# Patient Record
Sex: MALE | Race: WHITE | NOT HISPANIC OR LATINO | Employment: OTHER | URBAN - METROPOLITAN AREA
[De-identification: names, ages, dates, MRNs, and addresses within clinical notes are randomized per-mention and may not be internally consistent; named-entity substitution may affect disease eponyms.]

---

## 2017-01-13 ENCOUNTER — COMMUNICATION - HEALTHEAST (OUTPATIENT)
Dept: FAMILY MEDICINE | Facility: CLINIC | Age: 57
End: 2017-01-13

## 2017-02-26 ENCOUNTER — COMMUNICATION - HEALTHEAST (OUTPATIENT)
Dept: FAMILY MEDICINE | Facility: CLINIC | Age: 57
End: 2017-02-26

## 2017-02-26 DIAGNOSIS — I10 ESSENTIAL HYPERTENSION WITH GOAL BLOOD PRESSURE LESS THAN 140/90: ICD-10-CM

## 2017-05-14 ENCOUNTER — COMMUNICATION - HEALTHEAST (OUTPATIENT)
Dept: FAMILY MEDICINE | Facility: CLINIC | Age: 57
End: 2017-05-14

## 2017-05-14 DIAGNOSIS — R73.03 PREDIABETES: ICD-10-CM

## 2017-05-14 DIAGNOSIS — I10 ESSENTIAL HYPERTENSION: ICD-10-CM

## 2017-07-17 ENCOUNTER — OFFICE VISIT - HEALTHEAST (OUTPATIENT)
Dept: FAMILY MEDICINE | Facility: CLINIC | Age: 57
End: 2017-07-17

## 2017-07-17 DIAGNOSIS — Z23 NEED FOR VACCINATION: ICD-10-CM

## 2017-07-17 DIAGNOSIS — L57.0 ACTINIC KERATOSIS: ICD-10-CM

## 2017-07-17 DIAGNOSIS — I10 ESSENTIAL HYPERTENSION WITH GOAL BLOOD PRESSURE LESS THAN 140/90: ICD-10-CM

## 2017-07-17 DIAGNOSIS — G47.33 OSA (OBSTRUCTIVE SLEEP APNEA): ICD-10-CM

## 2017-07-17 ASSESSMENT — MIFFLIN-ST. JEOR: SCORE: 1967.9

## 2017-07-17 NOTE — ASSESSMENT & PLAN NOTE
Increase clonidine to 0.2 mg twice a day  Renal ultrasound to rule out renal artery stenosis  Follow-up in 2-3 weeks for recheck, sooner if warning signs and symptoms as discussed  Start using CPAP, will send back to sleep clinic for further evaluation and help with adjustments and recommendations for use.

## 2017-07-28 ENCOUNTER — HOSPITAL ENCOUNTER (OUTPATIENT)
Dept: ULTRASOUND IMAGING | Facility: HOSPITAL | Age: 57
Discharge: HOME | End: 2017-07-28
Attending: FAMILY MEDICINE

## 2017-07-28 DIAGNOSIS — I10 ESSENTIAL HYPERTENSION WITH GOAL BLOOD PRESSURE LESS THAN 140/90: ICD-10-CM

## 2017-08-03 ENCOUNTER — COMMUNICATION - HEALTHEAST (OUTPATIENT)
Dept: FAMILY MEDICINE | Facility: CLINIC | Age: 57
End: 2017-08-03

## 2017-08-07 ENCOUNTER — OFFICE VISIT - HEALTHEAST (OUTPATIENT)
Dept: FAMILY MEDICINE | Facility: CLINIC | Age: 57
End: 2017-08-07

## 2017-08-07 DIAGNOSIS — D35.01 ADRENAL ADENOMA, RIGHT: ICD-10-CM

## 2017-08-07 DIAGNOSIS — I10 ESSENTIAL HYPERTENSION WITH GOAL BLOOD PRESSURE LESS THAN 140/90: ICD-10-CM

## 2017-08-07 ASSESSMENT — MIFFLIN-ST. JEOR: SCORE: 1939.33

## 2017-08-07 NOTE — ASSESSMENT & PLAN NOTE
Improved.  Not fully controlled but significantly improved.  Diet and exercise highly encouraged to include bariatric program which patient is considering at this point.  Will change from the oral clonidine over to the patch.  Side effects precautions discussed.  Follow-up in 6 months for recheck, sooner if warning signs and symptoms as discussed.

## 2017-11-28 ENCOUNTER — COMMUNICATION - HEALTHEAST (OUTPATIENT)
Dept: FAMILY MEDICINE | Facility: CLINIC | Age: 57
End: 2017-11-28

## 2017-11-28 DIAGNOSIS — I10 ESSENTIAL HYPERTENSION: ICD-10-CM

## 2018-02-22 ENCOUNTER — COMMUNICATION - HEALTHEAST (OUTPATIENT)
Dept: FAMILY MEDICINE | Facility: CLINIC | Age: 58
End: 2018-02-22

## 2018-02-22 DIAGNOSIS — I10 HYPERTENSION: ICD-10-CM

## 2018-02-22 DIAGNOSIS — I10 ESSENTIAL HYPERTENSION: ICD-10-CM

## 2018-02-25 ENCOUNTER — COMMUNICATION - HEALTHEAST (OUTPATIENT)
Dept: FAMILY MEDICINE | Facility: CLINIC | Age: 58
End: 2018-02-25

## 2018-02-25 DIAGNOSIS — E78.00 PURE HYPERCHOLESTEROLEMIA: ICD-10-CM

## 2018-02-25 DIAGNOSIS — I10 ESSENTIAL HYPERTENSION: ICD-10-CM

## 2018-07-19 ENCOUNTER — COMMUNICATION - HEALTHEAST (OUTPATIENT)
Dept: FAMILY MEDICINE | Facility: CLINIC | Age: 58
End: 2018-07-19

## 2018-07-19 DIAGNOSIS — I10 ESSENTIAL HYPERTENSION WITH GOAL BLOOD PRESSURE LESS THAN 140/90: ICD-10-CM

## 2018-09-10 ENCOUNTER — OFFICE VISIT - HEALTHEAST (OUTPATIENT)
Dept: FAMILY MEDICINE | Facility: CLINIC | Age: 58
End: 2018-09-10

## 2018-09-10 DIAGNOSIS — Z12.11 SCREEN FOR COLON CANCER: ICD-10-CM

## 2018-09-10 DIAGNOSIS — E66.01 MORBID OBESITY (H): ICD-10-CM

## 2018-09-10 DIAGNOSIS — D35.01 ADRENAL ADENOMA, RIGHT: ICD-10-CM

## 2018-09-10 DIAGNOSIS — I10 ESSENTIAL HYPERTENSION: ICD-10-CM

## 2018-09-10 DIAGNOSIS — G47.33 OSA (OBSTRUCTIVE SLEEP APNEA): ICD-10-CM

## 2018-09-10 DIAGNOSIS — Z12.5 SCREENING FOR PROSTATE CANCER: ICD-10-CM

## 2018-09-10 DIAGNOSIS — R73.03 PREDIABETES: ICD-10-CM

## 2018-09-10 DIAGNOSIS — E78.00 PURE HYPERCHOLESTEROLEMIA: ICD-10-CM

## 2018-09-10 LAB
ALT SERPL W P-5'-P-CCNC: 21 U/L (ref 0–45)
ANION GAP SERPL CALCULATED.3IONS-SCNC: 10 MMOL/L (ref 5–18)
BUN SERPL-MCNC: 20 MG/DL (ref 8–22)
CALCIUM SERPL-MCNC: 9.3 MG/DL (ref 8.5–10.5)
CHLORIDE BLD-SCNC: 102 MMOL/L (ref 98–107)
CHOLEST SERPL-MCNC: 159 MG/DL
CO2 SERPL-SCNC: 31 MMOL/L (ref 22–31)
CREAT SERPL-MCNC: 0.9 MG/DL (ref 0.7–1.3)
CREAT UR-MCNC: 104.3 MG/DL
ERYTHROCYTE [DISTWIDTH] IN BLOOD BY AUTOMATED COUNT: 11.4 % (ref 11–14.5)
FASTING STATUS PATIENT QL REPORTED: YES
GFR SERPL CREATININE-BSD FRML MDRD: >60 ML/MIN/1.73M2
GLUCOSE BLD-MCNC: 122 MG/DL (ref 70–125)
HCT VFR BLD AUTO: 44 % (ref 40–54)
HDLC SERPL-MCNC: 38 MG/DL
HGB BLD-MCNC: 14.8 G/DL (ref 14–18)
LDLC SERPL CALC-MCNC: 104 MG/DL
MCH RBC QN AUTO: 30.7 PG (ref 27–34)
MCHC RBC AUTO-ENTMCNC: 33.6 G/DL (ref 32–36)
MCV RBC AUTO: 91 FL (ref 80–100)
MICROALBUMIN UR-MCNC: 48 MG/DL (ref 0–1.99)
MICROALBUMIN/CREAT UR: 460.2 MG/G
PLATELET # BLD AUTO: 301 THOU/UL (ref 140–440)
PMV BLD AUTO: 8.7 FL (ref 7–10)
POTASSIUM BLD-SCNC: 3.3 MMOL/L (ref 3.5–5)
PSA SERPL-MCNC: 3.2 NG/ML (ref 0–4.5)
RBC # BLD AUTO: 4.83 MILL/UL (ref 4.4–6.2)
SODIUM SERPL-SCNC: 143 MMOL/L (ref 136–145)
TRIGL SERPL-MCNC: 87 MG/DL
TSH SERPL DL<=0.005 MIU/L-ACNC: 2.39 UIU/ML (ref 0.3–5)
WBC: 7 THOU/UL (ref 4–11)

## 2018-09-10 ASSESSMENT — MIFFLIN-ST. JEOR: SCORE: 1938.65

## 2018-09-10 NOTE — ASSESSMENT & PLAN NOTE
Poorly controlled on quadruple therapy. Currently on clonidine, hydrochlorothiazide, lisinopril and metoprolol.  Denies any headaches, changes in vision, or warning signs.  As such I think he is safe to be discharged home.  But will get follow-up with nephrology for further evaluation.

## 2018-09-10 NOTE — ASSESSMENT & PLAN NOTE
Initially found in 2008 on CT of the abdomen, it was also noticed again on the 2017 ultrasound, size has not changed

## 2018-09-10 NOTE — ASSESSMENT & PLAN NOTE
Patient did not follow-up with sleep clinic as advised last year.  Still noncompliant with CPAP therapy

## 2018-09-10 NOTE — ASSESSMENT & PLAN NOTE
History of coronary artery disease with stents.  Doing well.  Continue statin therapy as well as beta blocker therapy and aspirin therapy.

## 2018-09-13 ENCOUNTER — RECORDS - HEALTHEAST (OUTPATIENT)
Dept: ADMINISTRATIVE | Facility: OTHER | Age: 58
End: 2018-09-13

## 2018-09-20 ENCOUNTER — COMMUNICATION - HEALTHEAST (OUTPATIENT)
Dept: FAMILY MEDICINE | Facility: CLINIC | Age: 58
End: 2018-09-20

## 2018-09-25 ENCOUNTER — HOSPITAL ENCOUNTER (OUTPATIENT)
Dept: CT IMAGING | Facility: HOSPITAL | Age: 58
Discharge: HOME | End: 2018-09-25
Attending: INTERNAL MEDICINE

## 2018-09-25 ENCOUNTER — RECORDS - HEALTHEAST (OUTPATIENT)
Dept: ADMINISTRATIVE | Facility: OTHER | Age: 58
End: 2018-09-25

## 2018-09-25 DIAGNOSIS — D35.01 ADRENAL CORTICAL ADENOMA, RIGHT: ICD-10-CM

## 2018-10-11 ENCOUNTER — RECORDS - HEALTHEAST (OUTPATIENT)
Dept: ADMINISTRATIVE | Facility: OTHER | Age: 58
End: 2018-10-11

## 2019-03-28 ENCOUNTER — COMMUNICATION - HEALTHEAST (OUTPATIENT)
Dept: FAMILY MEDICINE | Facility: CLINIC | Age: 59
End: 2019-03-28

## 2019-03-28 DIAGNOSIS — I10 ESSENTIAL HYPERTENSION: ICD-10-CM

## 2019-03-28 DIAGNOSIS — E78.00 PURE HYPERCHOLESTEROLEMIA: ICD-10-CM

## 2019-04-30 ENCOUNTER — RECORDS - HEALTHEAST (OUTPATIENT)
Dept: ADMINISTRATIVE | Facility: OTHER | Age: 59
End: 2019-04-30

## 2019-06-03 ENCOUNTER — OFFICE VISIT - HEALTHEAST (OUTPATIENT)
Dept: FAMILY MEDICINE | Facility: CLINIC | Age: 59
End: 2019-06-03

## 2019-06-03 DIAGNOSIS — N40.1 BENIGN PROSTATIC HYPERPLASIA WITH URINARY FREQUENCY: ICD-10-CM

## 2019-06-03 DIAGNOSIS — E66.01 MORBID OBESITY (H): ICD-10-CM

## 2019-06-03 DIAGNOSIS — E78.00 PURE HYPERCHOLESTEROLEMIA: ICD-10-CM

## 2019-06-03 DIAGNOSIS — I10 ESSENTIAL HYPERTENSION: ICD-10-CM

## 2019-06-03 DIAGNOSIS — I25.83 CORONARY ARTERY DISEASE DUE TO LIPID RICH PLAQUE: ICD-10-CM

## 2019-06-03 DIAGNOSIS — I25.10 CORONARY ARTERY DISEASE DUE TO LIPID RICH PLAQUE: ICD-10-CM

## 2019-06-03 DIAGNOSIS — Z12.5 PROSTATE CANCER SCREENING: ICD-10-CM

## 2019-06-03 DIAGNOSIS — R35.0 BENIGN PROSTATIC HYPERPLASIA WITH URINARY FREQUENCY: ICD-10-CM

## 2019-06-03 LAB
ALBUMIN SERPL-MCNC: 3.8 G/DL (ref 3.5–5)
ALP SERPL-CCNC: 62 U/L (ref 45–120)
ALT SERPL W P-5'-P-CCNC: 27 U/L (ref 0–45)
ANION GAP SERPL CALCULATED.3IONS-SCNC: 11 MMOL/L (ref 5–18)
AST SERPL W P-5'-P-CCNC: 17 U/L (ref 0–40)
BILIRUB DIRECT SERPL-MCNC: 0.4 MG/DL
BILIRUB SERPL-MCNC: 0.9 MG/DL (ref 0–1)
BUN SERPL-MCNC: 16 MG/DL (ref 8–22)
CALCIUM SERPL-MCNC: 9.6 MG/DL (ref 8.5–10.5)
CHLORIDE BLD-SCNC: 101 MMOL/L (ref 98–107)
CHOLEST SERPL-MCNC: 177 MG/DL
CO2 SERPL-SCNC: 29 MMOL/L (ref 22–31)
CREAT SERPL-MCNC: 0.87 MG/DL (ref 0.7–1.3)
ERYTHROCYTE [DISTWIDTH] IN BLOOD BY AUTOMATED COUNT: 11.3 % (ref 11–14.5)
FASTING STATUS PATIENT QL REPORTED: YES
GFR SERPL CREATININE-BSD FRML MDRD: >60 ML/MIN/1.73M2
GLUCOSE BLD-MCNC: 105 MG/DL (ref 70–125)
HCT VFR BLD AUTO: 43.8 % (ref 40–54)
HDLC SERPL-MCNC: 36 MG/DL
HGB BLD-MCNC: 14.5 G/DL (ref 14–18)
LDLC SERPL CALC-MCNC: 123 MG/DL
MCH RBC QN AUTO: 30.1 PG (ref 27–34)
MCHC RBC AUTO-ENTMCNC: 33.1 G/DL (ref 32–36)
MCV RBC AUTO: 91 FL (ref 80–100)
PLATELET # BLD AUTO: 288 THOU/UL (ref 140–440)
PMV BLD AUTO: 8.8 FL (ref 7–10)
POTASSIUM BLD-SCNC: 3.4 MMOL/L (ref 3.5–5)
PROT SERPL-MCNC: 7 G/DL (ref 6–8)
PSA SERPL-MCNC: 3.9 NG/ML (ref 0–4.5)
RBC # BLD AUTO: 4.82 MILL/UL (ref 4.4–6.2)
SODIUM SERPL-SCNC: 141 MMOL/L (ref 136–145)
TRIGL SERPL-MCNC: 89 MG/DL
WBC: 7.7 THOU/UL (ref 4–11)

## 2019-06-03 ASSESSMENT — MIFFLIN-ST. JEOR: SCORE: 1882.86

## 2019-06-03 NOTE — ASSESSMENT & PLAN NOTE
Not controlled with over-the-counter supplementation.  As such we will add in Flomax.  Side effects precautions discussed.  Expect improvement within 1 month, if not noticing any improvement after a month, contact clinic and will send to urology for further evaluation.

## 2019-06-03 NOTE — ASSESSMENT & PLAN NOTE
Given his past history and recounting of current situation; DOMINIQUE completed to retrieve records. Will also get cardiology consult.    ER notes reviewed after patient had left. Discussed with patient. Denies CP, shortness of breath, CROCKER, palpitations. However, given the elevation of Trop I 30 days ago and asymptomatic, will change to rapid access to prevent delay in scheduling and further evaluation. (patient refused ER evaluation given lack of symptoms)

## 2019-06-03 NOTE — ASSESSMENT & PLAN NOTE
Patient has lost 20 pounds in the past 6 months and feeling better energy.  Continue current efforts.

## 2019-06-03 NOTE — ASSESSMENT & PLAN NOTE
Poorly controlled. Increase metoprolol 50 mg to twice a day. Nephro report reviewed. Continue clonidine, lisinopril and HCTZ at current dosing. Significant leg edema on norvasc previously as well as low potassium levels and cramping when HCTZ elevated to 25 mg.     Also worried about what he stated from the work-up at the emergency room that he ended up leaving in [place].  DOMINIQUE was completed and the records are being faxed over today.  If it was truly elevated, given his history of coronary artery disease and stenting, will get into rapid access at cardiology.  In the meantime placed in a normal consult cardiology to help out with blood pressure.

## 2019-06-10 ENCOUNTER — OFFICE VISIT - HEALTHEAST (OUTPATIENT)
Dept: CARDIOLOGY | Facility: CLINIC | Age: 59
End: 2019-06-10

## 2019-06-10 DIAGNOSIS — I10 ESSENTIAL HYPERTENSION: ICD-10-CM

## 2019-06-10 DIAGNOSIS — I25.10 CORONARY ARTERY DISEASE DUE TO LIPID RICH PLAQUE: ICD-10-CM

## 2019-06-10 DIAGNOSIS — I45.10 RBBB: ICD-10-CM

## 2019-06-10 DIAGNOSIS — I25.83 CORONARY ARTERY DISEASE DUE TO LIPID RICH PLAQUE: ICD-10-CM

## 2019-06-10 LAB
ATRIAL RATE - MUSE: 51 BPM
DIASTOLIC BLOOD PRESSURE - MUSE: NORMAL MMHG
INTERPRETATION ECG - MUSE: NORMAL
P AXIS - MUSE: -14 DEGREES
PR INTERVAL - MUSE: 164 MS
QRS DURATION - MUSE: 164 MS
QT - MUSE: 518 MS
QTC - MUSE: 477 MS
R AXIS - MUSE: -31 DEGREES
SYSTOLIC BLOOD PRESSURE - MUSE: NORMAL MMHG
T AXIS - MUSE: 2 DEGREES
VENTRICULAR RATE- MUSE: 51 BPM

## 2019-06-17 ENCOUNTER — HOSPITAL ENCOUNTER (OUTPATIENT)
Dept: CARDIOLOGY | Facility: HOSPITAL | Age: 59
Discharge: HOME | End: 2019-06-17
Attending: INTERNAL MEDICINE

## 2019-06-17 ENCOUNTER — HOSPITAL ENCOUNTER (OUTPATIENT)
Dept: NUCLEAR MEDICINE | Facility: HOSPITAL | Age: 59
Discharge: HOME | End: 2019-06-17
Attending: INTERNAL MEDICINE

## 2019-06-17 ENCOUNTER — COMMUNICATION - HEALTHEAST (OUTPATIENT)
Dept: TELEHEALTH | Facility: CLINIC | Age: 59
End: 2019-06-17

## 2019-06-17 DIAGNOSIS — I25.83 CORONARY ARTERY DISEASE DUE TO LIPID RICH PLAQUE: ICD-10-CM

## 2019-06-17 DIAGNOSIS — I25.10 CORONARY ARTERY DISEASE DUE TO LIPID RICH PLAQUE: ICD-10-CM

## 2019-06-17 DIAGNOSIS — I45.10 RBBB: ICD-10-CM

## 2019-06-17 DIAGNOSIS — I10 ESSENTIAL HYPERTENSION: ICD-10-CM

## 2019-06-17 LAB
CV STRESS CURRENT BP HE: NORMAL
CV STRESS CURRENT HR HE: 100
CV STRESS CURRENT HR HE: 100
CV STRESS CURRENT HR HE: 102
CV STRESS CURRENT HR HE: 108
CV STRESS CURRENT HR HE: 110
CV STRESS CURRENT HR HE: 114
CV STRESS CURRENT HR HE: 115
CV STRESS CURRENT HR HE: 119
CV STRESS CURRENT HR HE: 128
CV STRESS CURRENT HR HE: 130
CV STRESS CURRENT HR HE: 135
CV STRESS CURRENT HR HE: 136
CV STRESS CURRENT HR HE: 138
CV STRESS CURRENT HR HE: 139
CV STRESS CURRENT HR HE: 57
CV STRESS CURRENT HR HE: 63
CV STRESS CURRENT HR HE: 72
CV STRESS CURRENT HR HE: 73
CV STRESS CURRENT HR HE: 74
CV STRESS CURRENT HR HE: 76
CV STRESS CURRENT HR HE: 76
CV STRESS CURRENT HR HE: 77
CV STRESS CURRENT HR HE: 78
CV STRESS CURRENT HR HE: 78
CV STRESS CURRENT HR HE: 79
CV STRESS CURRENT HR HE: 82
CV STRESS CURRENT HR HE: 85
CV STRESS CURRENT HR HE: 87
CV STRESS CURRENT HR HE: 96
CV STRESS CURRENT HR HE: 96
CV STRESS CURRENT HR HE: 97
CV STRESS CURRENT HR HE: 99
CV STRESS DEVIATION TIME HE: NORMAL
CV STRESS ECHO PERCENT HR HE: NORMAL
CV STRESS EXERCISE STAGE HE: NORMAL
CV STRESS EXERCISE STAGE REACHED HE: NORMAL
CV STRESS FINAL RESTING BP HE: NORMAL
CV STRESS FINAL RESTING HR HE: 73
CV STRESS MAX HR HE: 139
CV STRESS MAX TREADMILL GRADE HE: 14
CV STRESS MAX TREADMILL SPEED HE: 3.4
CV STRESS PEAK DIA BP HE: NORMAL
CV STRESS PEAK SYS BP HE: NORMAL
CV STRESS PHASE HE: NORMAL
CV STRESS PROTOCOL HE: NORMAL
CV STRESS RESTING PT POSITION HE: NORMAL
CV STRESS RESTING PT POSITION HE: NORMAL
CV STRESS ST DEVIATION AMOUNT HE: NORMAL
CV STRESS ST DEVIATION ELEVATION HE: NORMAL
CV STRESS ST EVELATION AMOUNT HE: NORMAL
CV STRESS TEST TYPE HE: NORMAL
CV STRESS TOTAL STAGE TIME MIN 1 HE: NORMAL
NUC STRESS EJECTION FRACTION: 55 %
STRESS ECHO BASELINE BP: NORMAL
STRESS ECHO BASELINE HR: 58
STRESS ECHO CALCULATED PERCENT HR: 87 %
STRESS ECHO LAST STRESS BP: NORMAL
STRESS ECHO LAST STRESS HR: 139
STRESS ECHO POST ESTIMATED WORKLOAD: 8.7
STRESS ECHO POST EXERCISE DUR MIN: 7
STRESS ECHO POST EXERCISE DUR SEC: 10
STRESS ECHO TARGET HR: 135

## 2019-06-21 ENCOUNTER — COMMUNICATION - HEALTHEAST (OUTPATIENT)
Dept: CARDIOLOGY | Facility: CLINIC | Age: 59
End: 2019-06-21

## 2019-06-24 ENCOUNTER — COMMUNICATION - HEALTHEAST (OUTPATIENT)
Dept: CARDIOLOGY | Facility: CLINIC | Age: 59
End: 2019-06-24

## 2019-06-25 ENCOUNTER — COMMUNICATION - HEALTHEAST (OUTPATIENT)
Dept: INTERNAL MEDICINE | Facility: CLINIC | Age: 59
End: 2019-06-25

## 2019-09-05 ENCOUNTER — AMBULATORY - HEALTHEAST (OUTPATIENT)
Dept: LAB | Facility: CLINIC | Age: 59
End: 2019-09-05

## 2019-09-05 DIAGNOSIS — D35.01 ADRENAL ADENOMA, RIGHT: ICD-10-CM

## 2019-09-05 DIAGNOSIS — E66.01 MORBID OBESITY (H): ICD-10-CM

## 2019-09-05 DIAGNOSIS — N18.2 CHRONIC KIDNEY DISEASE, STAGE II (MILD): ICD-10-CM

## 2019-09-05 DIAGNOSIS — I10 ESSENTIAL HYPERTENSION: ICD-10-CM

## 2019-09-18 ENCOUNTER — OFFICE VISIT - HEALTHEAST (OUTPATIENT)
Dept: INTERNAL MEDICINE | Facility: CLINIC | Age: 59
End: 2019-09-18

## 2019-09-18 DIAGNOSIS — G47.33 OSA (OBSTRUCTIVE SLEEP APNEA): ICD-10-CM

## 2019-09-18 DIAGNOSIS — I25.83 CORONARY ARTERY DISEASE DUE TO LIPID RICH PLAQUE: ICD-10-CM

## 2019-09-18 DIAGNOSIS — Z23 NEED FOR PROPHYLACTIC VACCINATION AND INOCULATION AGAINST INFLUENZA: ICD-10-CM

## 2019-09-18 DIAGNOSIS — N40.1 BENIGN PROSTATIC HYPERPLASIA WITH URINARY FREQUENCY: ICD-10-CM

## 2019-09-18 DIAGNOSIS — Z90.81 ASPLENIA AFTER SURGICAL PROCEDURE: ICD-10-CM

## 2019-09-18 DIAGNOSIS — N28.89 LEFT KIDNEY MASS: ICD-10-CM

## 2019-09-18 DIAGNOSIS — R35.0 BENIGN PROSTATIC HYPERPLASIA WITH URINARY FREQUENCY: ICD-10-CM

## 2019-09-18 DIAGNOSIS — I25.10 CORONARY ARTERY DISEASE DUE TO LIPID RICH PLAQUE: ICD-10-CM

## 2019-09-18 DIAGNOSIS — Z13.1 SCREENING FOR DIABETES MELLITUS: ICD-10-CM

## 2019-09-18 DIAGNOSIS — D35.01 ADRENAL ADENOMA, RIGHT: ICD-10-CM

## 2019-09-18 DIAGNOSIS — I10 ESSENTIAL HYPERTENSION: ICD-10-CM

## 2019-09-18 DIAGNOSIS — E66.01 MORBID OBESITY (H): ICD-10-CM

## 2019-09-18 LAB — HBA1C MFR BLD: 5.9 % (ref 3.5–6)

## 2019-09-18 ASSESSMENT — MIFFLIN-ST. JEOR: SCORE: 1893.29

## 2019-09-19 ENCOUNTER — HOSPITAL ENCOUNTER (OUTPATIENT)
Dept: ULTRASOUND IMAGING | Facility: HOSPITAL | Age: 59
Discharge: HOME | End: 2019-09-19
Attending: INTERNAL MEDICINE

## 2019-09-19 DIAGNOSIS — N28.89 LEFT KIDNEY MASS: ICD-10-CM

## 2019-09-19 LAB — ALDOST SERPL-MCNC: 19.1 NG/DL

## 2019-09-20 LAB — RENIN PLAS-CCNC: 0.1 NG/ML/HR

## 2019-09-23 ENCOUNTER — AMBULATORY - HEALTHEAST (OUTPATIENT)
Dept: LAB | Facility: HOSPITAL | Age: 59
End: 2019-09-23

## 2019-09-23 DIAGNOSIS — D35.01 ADRENAL ADENOMA, RIGHT: ICD-10-CM

## 2019-09-24 ENCOUNTER — COMMUNICATION - HEALTHEAST (OUTPATIENT)
Dept: INTERNAL MEDICINE | Facility: CLINIC | Age: 59
End: 2019-09-24

## 2019-09-27 ENCOUNTER — OFFICE VISIT - HEALTHEAST (OUTPATIENT)
Dept: INTERNAL MEDICINE | Facility: CLINIC | Age: 59
End: 2019-09-27

## 2019-09-27 DIAGNOSIS — D35.01 ADRENAL ADENOMA, RIGHT: ICD-10-CM

## 2019-09-27 DIAGNOSIS — Z90.81 ASPLENIA AFTER SURGICAL PROCEDURE: ICD-10-CM

## 2019-09-27 DIAGNOSIS — E66.01 MORBID OBESITY (H): ICD-10-CM

## 2019-09-27 DIAGNOSIS — G47.33 OSA (OBSTRUCTIVE SLEEP APNEA): ICD-10-CM

## 2019-09-27 DIAGNOSIS — I10 ESSENTIAL HYPERTENSION: ICD-10-CM

## 2019-09-27 LAB
ANION GAP SERPL CALCULATED.3IONS-SCNC: 8 MMOL/L (ref 5–18)
BUN SERPL-MCNC: 22 MG/DL (ref 8–22)
CALCIUM SERPL-MCNC: 9.9 MG/DL (ref 8.5–10.5)
CHLORIDE BLD-SCNC: 102 MMOL/L (ref 98–107)
CO2 SERPL-SCNC: 31 MMOL/L (ref 22–31)
COLLECT DURATION TIME SPEC: 24 HR
CREAT 24H UR-MRATE: 2005 MG/D (ref 800–2100)
CREAT SERPL-MCNC: 1.19 MG/DL (ref 0.7–1.3)
CREAT UR-MCNC: 81 MG/DL
GFR SERPL CREATININE-BSD FRML MDRD: >60 ML/MIN/1.73M2
GLUCOSE BLD-MCNC: 102 MG/DL (ref 70–125)
METANEPH 24H UR-MCNC: 68 UG/L
METANEPH 24H UR-MRATE: 168 UG/D (ref 62–207)
METANEPH+NORMETANEPH UR-IMP: NORMAL
METANEPH/CREAT 24H UR: 84 UG/G CRT (ref 0–300)
NORMETANEPHRINE 24H UR-MCNC: 168 UG/L
NORMETANEPHRINE 24H UR-MRATE: 416 UG/D (ref 125–510)
NORMETANEPHRINE/CREAT 24H UR: 207 UG/G CRT (ref 0–400)
POTASSIUM BLD-SCNC: 3.5 MMOL/L (ref 3.5–5)
SODIUM SERPL-SCNC: 141 MMOL/L (ref 136–145)
SPECIMEN VOL ?TM UR: 2475 ML

## 2019-09-27 ASSESSMENT — MIFFLIN-ST. JEOR: SCORE: 1911.43

## 2019-09-28 LAB
ANNOTATION COMMENT IMP: NORMAL
COLLECT DURATION TIME SPEC: 24 HR
CORTIS F 24H UR HPLC-MCNC: 16.2 UG/L
CORTIS F 24H UR-MRATE: 40.1 UG/D
CORTIS F/CREAT 24H UR: 20 UG/G CRT
CREAT 24H UR-MRATE: 2005 MG/D (ref 800–2100)
CREAT UR-MCNC: 81 MG/DL
SPECIMEN VOL ?TM UR: 2475 ML

## 2019-09-30 ENCOUNTER — RECORDS - HEALTHEAST (OUTPATIENT)
Dept: ADMINISTRATIVE | Facility: OTHER | Age: 59
End: 2019-09-30

## 2019-09-30 LAB
CATECHOLS UR-IMP: NORMAL
COLLECT DURATION TIME SPEC: 24 HR
CREAT 24H UR-MRATE: 2005 MG/D (ref 800–2100)
CREAT UR-MCNC: 81 MG/DL
DOPAMINE 24H UR-MRATE: 302 UG/D (ref 77–324)
DOPAMINE UR-MCNC: 122 UG/L
DOPAMINE/CREAT UR: 151 UG/G CRT (ref 0–250)
EPINEPH 24H UR-MRATE: 7 UG/D (ref 1–14)
EPINEPH UR-MCNC: 3 UG/L
EPINEPH/CREAT UR: 4 UG/G CRT (ref 0–20)
NOREPINEPH 24H UR-MRATE: 59 UG/D (ref 16–71)
NOREPINEPH UR-MCNC: 24 UG/L
NOREPINEPH/CREAT UR: 30 UG/G CRT (ref 0–45)
SPECIMEN VOL ?TM UR: 2475 ML

## 2019-10-03 ENCOUNTER — COMMUNICATION - HEALTHEAST (OUTPATIENT)
Dept: INTERNAL MEDICINE | Facility: CLINIC | Age: 59
End: 2019-10-03

## 2019-10-03 DIAGNOSIS — D35.01 ADRENAL ADENOMA, RIGHT: ICD-10-CM

## 2019-10-03 DIAGNOSIS — E26.9 HYPERALDOSTERONISM (H): ICD-10-CM

## 2019-10-11 ENCOUNTER — RECORDS - HEALTHEAST (OUTPATIENT)
Dept: ADMINISTRATIVE | Facility: OTHER | Age: 59
End: 2019-10-11

## 2019-10-21 ENCOUNTER — HOSPITAL ENCOUNTER (OUTPATIENT)
Dept: CT IMAGING | Facility: HOSPITAL | Age: 59
Discharge: HOME | End: 2019-10-21
Attending: INTERNAL MEDICINE

## 2019-10-21 DIAGNOSIS — E26.9 HYPERALDOSTERONISM (H): ICD-10-CM

## 2019-10-21 DIAGNOSIS — D35.01 ADRENAL ADENOMA, RIGHT: ICD-10-CM

## 2019-10-22 ENCOUNTER — RECORDS - HEALTHEAST (OUTPATIENT)
Dept: ADMINISTRATIVE | Facility: OTHER | Age: 59
End: 2019-10-22

## 2019-10-25 ENCOUNTER — RECORDS - HEALTHEAST (OUTPATIENT)
Dept: ADMINISTRATIVE | Facility: OTHER | Age: 59
End: 2019-10-25

## 2019-11-01 ENCOUNTER — RECORDS - HEALTHEAST (OUTPATIENT)
Dept: ADMINISTRATIVE | Facility: OTHER | Age: 59
End: 2019-11-01
Payer: COMMERCIAL

## 2019-11-07 ENCOUNTER — RECORDS - HEALTHEAST (OUTPATIENT)
Dept: ADMINISTRATIVE | Facility: OTHER | Age: 59
End: 2019-11-07

## 2019-11-14 ENCOUNTER — HOSPITAL ENCOUNTER (OUTPATIENT)
Dept: NUCLEAR MEDICINE | Facility: HOSPITAL | Age: 59
Discharge: HOME | End: 2019-11-14
Attending: UROLOGY

## 2019-11-14 ENCOUNTER — HOSPITAL ENCOUNTER (OUTPATIENT)
Dept: CT IMAGING | Facility: HOSPITAL | Age: 59
Discharge: HOME | End: 2019-11-14
Attending: UROLOGY

## 2019-11-14 DIAGNOSIS — C61 PROSTATE CANCER (H): ICD-10-CM

## 2019-11-14 LAB
CREAT BLD-MCNC: 1.3 MG/DL (ref 0.7–1.3)
GFR SERPL CREATININE-BSD FRML MDRD: 56 ML/MIN/1.73M2

## 2019-11-15 ENCOUNTER — RECORDS - HEALTHEAST (OUTPATIENT)
Dept: ADMINISTRATIVE | Facility: OTHER | Age: 59
End: 2019-11-15

## 2019-11-18 ENCOUNTER — COMMUNICATION - HEALTHEAST (OUTPATIENT)
Dept: INTERNAL MEDICINE | Facility: CLINIC | Age: 59
End: 2019-11-18

## 2019-11-21 ENCOUNTER — OFFICE VISIT - HEALTHEAST (OUTPATIENT)
Dept: INTERNAL MEDICINE | Facility: CLINIC | Age: 59
End: 2019-11-21

## 2019-11-21 DIAGNOSIS — D35.00: ICD-10-CM

## 2019-11-21 DIAGNOSIS — G47.33 OSA (OBSTRUCTIVE SLEEP APNEA): ICD-10-CM

## 2019-11-21 DIAGNOSIS — I10 ESSENTIAL HYPERTENSION: ICD-10-CM

## 2019-11-21 DIAGNOSIS — I25.83 CORONARY ARTERY DISEASE DUE TO LIPID RICH PLAQUE: ICD-10-CM

## 2019-11-21 DIAGNOSIS — E66.01 MORBID OBESITY (H): ICD-10-CM

## 2019-11-21 DIAGNOSIS — D35.01 ADRENAL ADENOMA, RIGHT: ICD-10-CM

## 2019-11-21 DIAGNOSIS — C61 PROSTATE CANCER (H): ICD-10-CM

## 2019-11-21 DIAGNOSIS — Z01.818 PREOPERATIVE EXAMINATION: ICD-10-CM

## 2019-11-21 DIAGNOSIS — E26.09: ICD-10-CM

## 2019-11-21 DIAGNOSIS — I25.10 CORONARY ARTERY DISEASE DUE TO LIPID RICH PLAQUE: ICD-10-CM

## 2019-11-21 LAB
CREAT SERPL-MCNC: 1.26 MG/DL (ref 0.7–1.3)
GFR SERPL CREATININE-BSD FRML MDRD: 58 ML/MIN/1.73M2
POTASSIUM BLD-SCNC: 3.8 MMOL/L (ref 3.5–5)

## 2019-11-21 ASSESSMENT — MIFFLIN-ST. JEOR: SCORE: 1934.11

## 2019-11-26 ENCOUNTER — COMMUNICATION - HEALTHEAST (OUTPATIENT)
Dept: INTERNAL MEDICINE | Facility: CLINIC | Age: 59
End: 2019-11-26

## 2019-11-26 ENCOUNTER — HOSPITAL ENCOUNTER (OUTPATIENT)
Dept: CT IMAGING | Facility: HOSPITAL | Age: 59
Discharge: HOME | End: 2019-11-26
Attending: UROLOGY

## 2019-11-26 DIAGNOSIS — C61 PROSTATE CANCER (H): ICD-10-CM

## 2019-11-26 DIAGNOSIS — R59.1 LYMPHADENOPATHY: ICD-10-CM

## 2019-11-26 LAB
HGB BLD-MCNC: 14.1 G/DL (ref 14–18)
INR PPP: 0.99 (ref 0.9–1.1)
PLATELET # BLD AUTO: 332 THOU/UL (ref 140–440)

## 2019-11-26 ASSESSMENT — MIFFLIN-ST. JEOR: SCORE: 1911.43

## 2019-12-04 ENCOUNTER — HOSPITAL ENCOUNTER (OUTPATIENT)
Dept: CT IMAGING | Facility: HOSPITAL | Age: 59
Discharge: HOME | End: 2019-12-04
Attending: UROLOGY | Admitting: RADIOLOGY

## 2019-12-04 ENCOUNTER — RECORDS - HEALTHEAST (OUTPATIENT)
Dept: ADMINISTRATIVE | Facility: OTHER | Age: 59
End: 2019-12-04

## 2019-12-04 ASSESSMENT — MIFFLIN-ST. JEOR: SCORE: 1902.36

## 2019-12-05 ENCOUNTER — RECORDS - HEALTHEAST (OUTPATIENT)
Dept: ADMINISTRATIVE | Facility: OTHER | Age: 59
End: 2019-12-05

## 2019-12-09 ENCOUNTER — RECORDS - HEALTHEAST (OUTPATIENT)
Dept: ADMINISTRATIVE | Facility: OTHER | Age: 59
End: 2019-12-09

## 2019-12-09 ENCOUNTER — COMMUNICATION - HEALTHEAST (OUTPATIENT)
Dept: INTERNAL MEDICINE | Facility: CLINIC | Age: 59
End: 2019-12-09

## 2019-12-09 DIAGNOSIS — C79.89 PROSTATE CANCER METASTATIC TO PELVIS (H): ICD-10-CM

## 2019-12-09 DIAGNOSIS — C61 PROSTATE CANCER METASTATIC TO PELVIS (H): ICD-10-CM

## 2019-12-11 ENCOUNTER — RECORDS - HEALTHEAST (OUTPATIENT)
Dept: ADMINISTRATIVE | Facility: OTHER | Age: 59
End: 2019-12-11

## 2019-12-13 ENCOUNTER — COMMUNICATION - HEALTHEAST (OUTPATIENT)
Dept: ONCOLOGY | Facility: HOSPITAL | Age: 59
End: 2019-12-13

## 2020-01-03 ENCOUNTER — OFFICE VISIT - HEALTHEAST (OUTPATIENT)
Dept: ONCOLOGY | Facility: HOSPITAL | Age: 60
End: 2020-01-03

## 2020-01-03 DIAGNOSIS — C77.5 PROSTATE CANCER METASTATIC TO INTRAPELVIC LYMPH NODE (H): ICD-10-CM

## 2020-01-03 DIAGNOSIS — C79.89 PROSTATE CANCER METASTATIC TO PELVIS (H): ICD-10-CM

## 2020-01-03 DIAGNOSIS — C61 PROSTATE CANCER METASTATIC TO PELVIS (H): ICD-10-CM

## 2020-01-03 DIAGNOSIS — C61 PROSTATE CANCER METASTATIC TO INTRAPELVIC LYMPH NODE (H): ICD-10-CM

## 2020-01-08 ENCOUNTER — AMBULATORY - HEALTHEAST (OUTPATIENT)
Dept: INFUSION THERAPY | Facility: HOSPITAL | Age: 60
End: 2020-01-08

## 2020-01-08 ENCOUNTER — INFUSION - HEALTHEAST (OUTPATIENT)
Dept: INFUSION THERAPY | Facility: HOSPITAL | Age: 60
End: 2020-01-08

## 2020-01-08 ENCOUNTER — HOSPITAL ENCOUNTER (OUTPATIENT)
Dept: PET IMAGING | Facility: HOSPITAL | Age: 60
Setting detail: RADIATION/ONCOLOGY SERIES
Discharge: STILL ADMITTED OR ADMINISTRATIVE | End: 2020-01-08
Attending: INTERNAL MEDICINE

## 2020-01-08 DIAGNOSIS — C77.5 PROSTATE CANCER METASTATIC TO INTRAPELVIC LYMPH NODE (H): ICD-10-CM

## 2020-01-08 DIAGNOSIS — C61 PROSTATE CANCER METASTATIC TO INTRAPELVIC LYMPH NODE (H): ICD-10-CM

## 2020-01-08 LAB
ALBUMIN SERPL-MCNC: 3.8 G/DL (ref 3.5–5)
ALP SERPL-CCNC: 56 U/L (ref 45–120)
ALT SERPL W P-5'-P-CCNC: 23 U/L (ref 0–45)
ANION GAP SERPL CALCULATED.3IONS-SCNC: 7 MMOL/L (ref 5–18)
AST SERPL W P-5'-P-CCNC: 16 U/L (ref 0–40)
BASOPHILS # BLD AUTO: 0.1 THOU/UL (ref 0–0.2)
BASOPHILS NFR BLD AUTO: 1 % (ref 0–2)
BILIRUB SERPL-MCNC: 0.7 MG/DL (ref 0–1)
BUN SERPL-MCNC: 32 MG/DL (ref 8–22)
CALCIUM SERPL-MCNC: 9.3 MG/DL (ref 8.5–10.5)
CHLORIDE BLD-SCNC: 105 MMOL/L (ref 98–107)
CO2 SERPL-SCNC: 30 MMOL/L (ref 22–31)
CREAT SERPL-MCNC: 1.17 MG/DL (ref 0.7–1.3)
EOSINOPHIL # BLD AUTO: 0.4 THOU/UL (ref 0–0.4)
EOSINOPHIL NFR BLD AUTO: 5 % (ref 0–6)
ERYTHROCYTE [DISTWIDTH] IN BLOOD BY AUTOMATED COUNT: 14.6 % (ref 11–14.5)
GFR SERPL CREATININE-BSD FRML MDRD: >60 ML/MIN/1.73M2
GLUCOSE BLD-MCNC: 118 MG/DL (ref 70–125)
HCT VFR BLD AUTO: 40.7 % (ref 40–54)
HGB BLD-MCNC: 13.7 G/DL (ref 14–18)
LYMPHOCYTES # BLD AUTO: 1.6 THOU/UL (ref 0.8–4.4)
LYMPHOCYTES NFR BLD AUTO: 22 % (ref 20–40)
MCH RBC QN AUTO: 30.3 PG (ref 27–34)
MCHC RBC AUTO-ENTMCNC: 33.7 G/DL (ref 32–36)
MCV RBC AUTO: 90 FL (ref 80–100)
MONOCYTES # BLD AUTO: 0.7 THOU/UL (ref 0–0.9)
MONOCYTES NFR BLD AUTO: 9 % (ref 2–10)
NEUTROPHILS # BLD AUTO: 4.4 THOU/UL (ref 2–7.7)
NEUTROPHILS NFR BLD AUTO: 62 % (ref 50–70)
PLATELET # BLD AUTO: 377 THOU/UL (ref 140–440)
PMV BLD AUTO: 10.3 FL (ref 8.5–12.5)
POTASSIUM BLD-SCNC: 3.9 MMOL/L (ref 3.5–5)
PROT SERPL-MCNC: 7.5 G/DL (ref 6–8)
PSA SERPL-MCNC: 0.8 NG/ML (ref 0–4.5)
RBC # BLD AUTO: 4.52 MILL/UL (ref 4.4–6.2)
SODIUM SERPL-SCNC: 142 MMOL/L (ref 136–145)
WBC: 7.1 THOU/UL (ref 4–11)

## 2020-01-09 ENCOUNTER — AMBULATORY - HEALTHEAST (OUTPATIENT)
Dept: ONCOLOGY | Facility: HOSPITAL | Age: 60
End: 2020-01-09

## 2020-01-09 ENCOUNTER — OFFICE VISIT - HEALTHEAST (OUTPATIENT)
Dept: RADIATION ONCOLOGY | Facility: HOSPITAL | Age: 60
End: 2020-01-09

## 2020-01-09 DIAGNOSIS — C61 PROSTATE CANCER METASTATIC TO INTRAPELVIC LYMPH NODE (H): ICD-10-CM

## 2020-01-09 DIAGNOSIS — C77.5 PROSTATE CANCER METASTATIC TO INTRAPELVIC LYMPH NODE (H): ICD-10-CM

## 2020-01-09 DIAGNOSIS — C61 PROSTATE CANCER METASTATIC TO PELVIS (H): ICD-10-CM

## 2020-01-09 DIAGNOSIS — C79.89 PROSTATE CANCER METASTATIC TO PELVIS (H): ICD-10-CM

## 2020-01-10 ENCOUNTER — COMMUNICATION - HEALTHEAST (OUTPATIENT)
Dept: ONCOLOGY | Facility: HOSPITAL | Age: 60
End: 2020-01-10

## 2020-01-10 LAB
SHBG SERPL-SCNC: 35 NMOL/L (ref 11–80)
TESTOST FREE SERPL-MCNC: 0.15 NG/DL (ref 4.7–24.4)
TESTOST SERPL-MCNC: 9 NG/DL (ref 240–950)

## 2020-01-16 ENCOUNTER — AMBULATORY - HEALTHEAST (OUTPATIENT)
Dept: ONCOLOGY | Facility: HOSPITAL | Age: 60
End: 2020-01-16

## 2020-01-16 DIAGNOSIS — C61 PROSTATE CANCER METASTATIC TO INTRAPELVIC LYMPH NODE (H): ICD-10-CM

## 2020-01-16 DIAGNOSIS — C77.5 PROSTATE CANCER METASTATIC TO INTRAPELVIC LYMPH NODE (H): ICD-10-CM

## 2020-01-17 ENCOUNTER — COMMUNICATION - HEALTHEAST (OUTPATIENT)
Dept: ONCOLOGY | Facility: HOSPITAL | Age: 60
End: 2020-01-17

## 2020-01-23 ENCOUNTER — AMBULATORY - HEALTHEAST (OUTPATIENT)
Dept: RADIATION ONCOLOGY | Facility: HOSPITAL | Age: 60
End: 2020-01-23

## 2020-01-23 DIAGNOSIS — C61 PROSTATE CANCER METASTATIC TO INTRAPELVIC LYMPH NODE (H): ICD-10-CM

## 2020-01-23 DIAGNOSIS — C77.5 PROSTATE CANCER METASTATIC TO INTRAPELVIC LYMPH NODE (H): ICD-10-CM

## 2020-01-24 ENCOUNTER — COMMUNICATION - HEALTHEAST (OUTPATIENT)
Dept: ONCOLOGY | Facility: HOSPITAL | Age: 60
End: 2020-01-24

## 2020-01-31 ENCOUNTER — COMMUNICATION - HEALTHEAST (OUTPATIENT)
Dept: INTERNAL MEDICINE | Facility: CLINIC | Age: 60
End: 2020-01-31

## 2020-02-03 ENCOUNTER — COMMUNICATION - HEALTHEAST (OUTPATIENT)
Dept: ADMINISTRATIVE | Facility: HOSPITAL | Age: 60
End: 2020-02-03

## 2020-02-03 ENCOUNTER — COMMUNICATION - HEALTHEAST (OUTPATIENT)
Dept: ONCOLOGY | Facility: HOSPITAL | Age: 60
End: 2020-02-03

## 2020-02-05 ENCOUNTER — AMBULATORY - HEALTHEAST (OUTPATIENT)
Dept: ONCOLOGY | Facility: HOSPITAL | Age: 60
End: 2020-02-05

## 2020-02-05 ENCOUNTER — COMMUNICATION - HEALTHEAST (OUTPATIENT)
Dept: ONCOLOGY | Facility: HOSPITAL | Age: 60
End: 2020-02-05

## 2020-02-05 DIAGNOSIS — C61 PROSTATE CANCER METASTATIC TO INTRAPELVIC LYMPH NODE (H): ICD-10-CM

## 2020-02-05 DIAGNOSIS — C77.5 PROSTATE CANCER METASTATIC TO INTRAPELVIC LYMPH NODE (H): ICD-10-CM

## 2020-02-06 ENCOUNTER — AMBULATORY - HEALTHEAST (OUTPATIENT)
Dept: INFUSION THERAPY | Facility: HOSPITAL | Age: 60
End: 2020-02-06

## 2020-02-06 ENCOUNTER — COMMUNICATION - HEALTHEAST (OUTPATIENT)
Dept: ONCOLOGY | Facility: HOSPITAL | Age: 60
End: 2020-02-06

## 2020-02-06 DIAGNOSIS — C77.5 PROSTATE CANCER METASTATIC TO INTRAPELVIC LYMPH NODE (H): ICD-10-CM

## 2020-02-06 DIAGNOSIS — C61 PROSTATE CANCER METASTATIC TO INTRAPELVIC LYMPH NODE (H): ICD-10-CM

## 2020-02-06 LAB
ALBUMIN SERPL-MCNC: 3.8 G/DL (ref 3.5–5)
ALP SERPL-CCNC: 52 U/L (ref 45–120)
ALT SERPL W P-5'-P-CCNC: 27 U/L (ref 0–45)
ANION GAP SERPL CALCULATED.3IONS-SCNC: 8 MMOL/L (ref 5–18)
AST SERPL W P-5'-P-CCNC: 21 U/L (ref 0–40)
BASOPHILS # BLD AUTO: 0.2 THOU/UL (ref 0–0.2)
BASOPHILS NFR BLD AUTO: 2 % (ref 0–2)
BILIRUB SERPL-MCNC: 0.5 MG/DL (ref 0–1)
BUN SERPL-MCNC: 22 MG/DL (ref 8–22)
CALCIUM SERPL-MCNC: 9.8 MG/DL (ref 8.5–10.5)
CHLORIDE BLD-SCNC: 104 MMOL/L (ref 98–107)
CO2 SERPL-SCNC: 30 MMOL/L (ref 22–31)
CREAT SERPL-MCNC: 1.13 MG/DL (ref 0.7–1.3)
EOSINOPHIL # BLD AUTO: 0.5 THOU/UL (ref 0–0.4)
EOSINOPHIL NFR BLD AUTO: 8 % (ref 0–6)
ERYTHROCYTE [DISTWIDTH] IN BLOOD BY AUTOMATED COUNT: 14.3 % (ref 11–14.5)
GFR SERPL CREATININE-BSD FRML MDRD: >60 ML/MIN/1.73M2
GLUCOSE BLD-MCNC: 96 MG/DL (ref 70–125)
HCT VFR BLD AUTO: 37.8 % (ref 40–54)
HGB BLD-MCNC: 12.7 G/DL (ref 14–18)
LYMPHOCYTES # BLD AUTO: 1.5 THOU/UL (ref 0.8–4.4)
LYMPHOCYTES NFR BLD AUTO: 25 % (ref 20–40)
MCH RBC QN AUTO: 30.2 PG (ref 27–34)
MCHC RBC AUTO-ENTMCNC: 33.6 G/DL (ref 32–36)
MCV RBC AUTO: 90 FL (ref 80–100)
MONOCYTES # BLD AUTO: 0.9 THOU/UL (ref 0–0.9)
MONOCYTES NFR BLD AUTO: 14 % (ref 2–10)
NEUTROPHILS # BLD AUTO: 3.1 THOU/UL (ref 2–7.7)
NEUTROPHILS NFR BLD AUTO: 51 % (ref 50–70)
PLATELET # BLD AUTO: 324 THOU/UL (ref 140–440)
PMV BLD AUTO: 10.9 FL (ref 8.5–12.5)
POTASSIUM BLD-SCNC: 3.7 MMOL/L (ref 3.5–5)
PROT SERPL-MCNC: 7.2 G/DL (ref 6–8)
PSA SERPL-MCNC: 0.1 NG/ML (ref 0–4.5)
RBC # BLD AUTO: 4.2 MILL/UL (ref 4.4–6.2)
SODIUM SERPL-SCNC: 142 MMOL/L (ref 136–145)
WBC: 6.2 THOU/UL (ref 4–11)

## 2020-02-18 ENCOUNTER — COMMUNICATION - HEALTHEAST (OUTPATIENT)
Dept: ADMINISTRATIVE | Facility: HOSPITAL | Age: 60
End: 2020-02-18

## 2020-03-02 ENCOUNTER — COMMUNICATION - HEALTHEAST (OUTPATIENT)
Dept: ONCOLOGY | Facility: HOSPITAL | Age: 60
End: 2020-03-02

## 2020-03-11 ENCOUNTER — AMBULATORY - HEALTHEAST (OUTPATIENT)
Dept: INFUSION THERAPY | Facility: HOSPITAL | Age: 60
End: 2020-03-11

## 2020-03-11 DIAGNOSIS — C77.5 PROSTATE CANCER METASTATIC TO INTRAPELVIC LYMPH NODE (H): ICD-10-CM

## 2020-03-11 DIAGNOSIS — C61 PROSTATE CANCER METASTATIC TO INTRAPELVIC LYMPH NODE (H): ICD-10-CM

## 2020-03-11 LAB
ALBUMIN SERPL-MCNC: 3.7 G/DL (ref 3.5–5)
ALP SERPL-CCNC: 55 U/L (ref 45–120)
ALT SERPL W P-5'-P-CCNC: 24 U/L (ref 0–45)
ANION GAP SERPL CALCULATED.3IONS-SCNC: 7 MMOL/L (ref 5–18)
AST SERPL W P-5'-P-CCNC: 21 U/L (ref 0–40)
BASOPHILS # BLD AUTO: 0.1 THOU/UL (ref 0–0.2)
BASOPHILS NFR BLD AUTO: 2 % (ref 0–2)
BILIRUB SERPL-MCNC: 0.5 MG/DL (ref 0–1)
BUN SERPL-MCNC: 17 MG/DL (ref 8–22)
CALCIUM SERPL-MCNC: 9.4 MG/DL (ref 8.5–10.5)
CHLORIDE BLD-SCNC: 99 MMOL/L (ref 98–107)
CO2 SERPL-SCNC: 34 MMOL/L (ref 22–31)
CREAT SERPL-MCNC: 0.96 MG/DL (ref 0.7–1.3)
EOSINOPHIL # BLD AUTO: 0.4 THOU/UL (ref 0–0.4)
EOSINOPHIL NFR BLD AUTO: 6 % (ref 0–6)
ERYTHROCYTE [DISTWIDTH] IN BLOOD BY AUTOMATED COUNT: 14.5 % (ref 11–14.5)
GFR SERPL CREATININE-BSD FRML MDRD: >60 ML/MIN/1.73M2
GLUCOSE BLD-MCNC: 109 MG/DL (ref 70–125)
HCT VFR BLD AUTO: 38.8 % (ref 40–54)
HGB BLD-MCNC: 13.1 G/DL (ref 14–18)
LYMPHOCYTES # BLD AUTO: 1.9 THOU/UL (ref 0.8–4.4)
LYMPHOCYTES NFR BLD AUTO: 29 % (ref 20–40)
MCH RBC QN AUTO: 31 PG (ref 27–34)
MCHC RBC AUTO-ENTMCNC: 33.8 G/DL (ref 32–36)
MCV RBC AUTO: 92 FL (ref 80–100)
MONOCYTES # BLD AUTO: 0.8 THOU/UL (ref 0–0.9)
MONOCYTES NFR BLD AUTO: 12 % (ref 2–10)
NEUTROPHILS # BLD AUTO: 3.4 THOU/UL (ref 2–7.7)
NEUTROPHILS NFR BLD AUTO: 51 % (ref 50–70)
PLATELET # BLD AUTO: 347 THOU/UL (ref 140–440)
PMV BLD AUTO: 11.1 FL (ref 8.5–12.5)
POTASSIUM BLD-SCNC: 3 MMOL/L (ref 3.5–5)
PROT SERPL-MCNC: 7.3 G/DL (ref 6–8)
RBC # BLD AUTO: 4.22 MILL/UL (ref 4.4–6.2)
SODIUM SERPL-SCNC: 140 MMOL/L (ref 136–145)
WBC: 6.6 THOU/UL (ref 4–11)

## 2020-03-12 ENCOUNTER — COMMUNICATION - HEALTHEAST (OUTPATIENT)
Dept: PHARMACY | Facility: HOSPITAL | Age: 60
End: 2020-03-12

## 2020-03-12 ENCOUNTER — COMMUNICATION - HEALTHEAST (OUTPATIENT)
Dept: ONCOLOGY | Facility: HOSPITAL | Age: 60
End: 2020-03-12

## 2020-03-21 ENCOUNTER — COMMUNICATION - HEALTHEAST (OUTPATIENT)
Dept: FAMILY MEDICINE | Facility: CLINIC | Age: 60
End: 2020-03-21

## 2020-03-21 DIAGNOSIS — I10 ESSENTIAL HYPERTENSION: ICD-10-CM

## 2020-03-21 DIAGNOSIS — E78.00 PURE HYPERCHOLESTEROLEMIA: ICD-10-CM

## 2020-04-14 ENCOUNTER — COMMUNICATION - HEALTHEAST (OUTPATIENT)
Dept: INTERNAL MEDICINE | Facility: CLINIC | Age: 60
End: 2020-04-14

## 2020-04-14 ENCOUNTER — AMBULATORY - HEALTHEAST (OUTPATIENT)
Dept: INFUSION THERAPY | Facility: HOSPITAL | Age: 60
End: 2020-04-14

## 2020-04-14 ENCOUNTER — INFUSION - HEALTHEAST (OUTPATIENT)
Dept: INFUSION THERAPY | Facility: HOSPITAL | Age: 60
End: 2020-04-14

## 2020-04-14 DIAGNOSIS — C77.5 PROSTATE CANCER METASTATIC TO INTRAPELVIC LYMPH NODE (H): ICD-10-CM

## 2020-04-14 DIAGNOSIS — C79.89 PROSTATE CANCER METASTATIC TO PELVIS (H): ICD-10-CM

## 2020-04-14 DIAGNOSIS — C61 PROSTATE CANCER METASTATIC TO INTRAPELVIC LYMPH NODE (H): ICD-10-CM

## 2020-04-14 DIAGNOSIS — C61 PROSTATE CANCER METASTATIC TO PELVIS (H): ICD-10-CM

## 2020-04-14 LAB
CHOLEST SERPL-MCNC: 315 MG/DL
FASTING STATUS PATIENT QL REPORTED: NO
HDLC SERPL-MCNC: 48 MG/DL
LDLC SERPL CALC-MCNC: 221 MG/DL
PSA SERPL-MCNC: <0.1 NG/ML (ref 0–4.5)
TRIGL SERPL-MCNC: 229 MG/DL
TSH SERPL DL<=0.005 MIU/L-ACNC: 3.77 UIU/ML (ref 0.3–5)

## 2020-04-16 ENCOUNTER — COMMUNICATION - HEALTHEAST (OUTPATIENT)
Dept: RADIATION ONCOLOGY | Facility: HOSPITAL | Age: 60
End: 2020-04-16

## 2020-04-16 DIAGNOSIS — C61 PROSTATE CANCER METASTATIC TO INTRAPELVIC LYMPH NODE (H): ICD-10-CM

## 2020-04-16 DIAGNOSIS — C77.5 PROSTATE CANCER METASTATIC TO INTRAPELVIC LYMPH NODE (H): ICD-10-CM

## 2020-04-17 ENCOUNTER — COMMUNICATION - HEALTHEAST (OUTPATIENT)
Dept: ADMINISTRATIVE | Facility: HOSPITAL | Age: 60
End: 2020-04-17

## 2020-04-21 ENCOUNTER — COMMUNICATION - HEALTHEAST (OUTPATIENT)
Dept: INTERNAL MEDICINE | Facility: CLINIC | Age: 60
End: 2020-04-21

## 2020-04-21 ENCOUNTER — OFFICE VISIT - HEALTHEAST (OUTPATIENT)
Dept: ONCOLOGY | Facility: HOSPITAL | Age: 60
End: 2020-04-21

## 2020-04-21 DIAGNOSIS — C77.5 PROSTATE CANCER METASTATIC TO INTRAPELVIC LYMPH NODE (H): ICD-10-CM

## 2020-04-21 DIAGNOSIS — M25.551 HIP PAIN, BILATERAL: ICD-10-CM

## 2020-04-21 DIAGNOSIS — E78.00 ELEVATED CHOLESTEROL: ICD-10-CM

## 2020-04-21 DIAGNOSIS — C61 PROSTATE CANCER METASTATIC TO INTRAPELVIC LYMPH NODE (H): ICD-10-CM

## 2020-04-21 DIAGNOSIS — M25.552 HIP PAIN, BILATERAL: ICD-10-CM

## 2020-04-22 ENCOUNTER — COMMUNICATION - HEALTHEAST (OUTPATIENT)
Dept: INTERNAL MEDICINE | Facility: CLINIC | Age: 60
End: 2020-04-22

## 2020-04-22 ENCOUNTER — OFFICE VISIT - HEALTHEAST (OUTPATIENT)
Dept: INTERNAL MEDICINE | Facility: CLINIC | Age: 60
End: 2020-04-22

## 2020-04-22 DIAGNOSIS — I15.9 SECONDARY HYPERTENSION: ICD-10-CM

## 2020-04-22 DIAGNOSIS — R79.89 LOW SERUM RENIN: ICD-10-CM

## 2020-04-22 DIAGNOSIS — I10 ESSENTIAL HYPERTENSION: ICD-10-CM

## 2020-04-22 DIAGNOSIS — D35.01 ADRENAL ADENOMA, RIGHT: ICD-10-CM

## 2020-04-22 DIAGNOSIS — E26.01: ICD-10-CM

## 2020-04-23 ENCOUNTER — RECORDS - HEALTHEAST (OUTPATIENT)
Dept: ADMINISTRATIVE | Facility: OTHER | Age: 60
End: 2020-04-23

## 2020-04-24 ENCOUNTER — AMBULATORY - HEALTHEAST (OUTPATIENT)
Dept: INFUSION THERAPY | Facility: HOSPITAL | Age: 60
End: 2020-04-24

## 2020-04-24 DIAGNOSIS — R79.89 LOW SERUM RENIN: ICD-10-CM

## 2020-04-24 DIAGNOSIS — D35.01 ADRENAL ADENOMA, RIGHT: ICD-10-CM

## 2020-04-24 DIAGNOSIS — I15.9 SECONDARY HYPERTENSION: ICD-10-CM

## 2020-04-24 DIAGNOSIS — E26.01: ICD-10-CM

## 2020-04-24 LAB
ANION GAP SERPL CALCULATED.3IONS-SCNC: 9 MMOL/L (ref 5–18)
BUN SERPL-MCNC: 17 MG/DL (ref 8–22)
CALCIUM SERPL-MCNC: 9.5 MG/DL (ref 8.5–10.5)
CHLORIDE BLD-SCNC: 102 MMOL/L (ref 98–107)
CO2 SERPL-SCNC: 31 MMOL/L (ref 22–31)
CREAT SERPL-MCNC: 0.94 MG/DL (ref 0.7–1.3)
GFR SERPL CREATININE-BSD FRML MDRD: >60 ML/MIN/1.73M2
GLUCOSE BLD-MCNC: 131 MG/DL (ref 70–125)
POTASSIUM BLD-SCNC: 2.9 MMOL/L (ref 3.5–5)
SODIUM SERPL-SCNC: 142 MMOL/L (ref 136–145)

## 2020-04-25 LAB — ALDOST SERPL-MCNC: 22.1 NG/DL

## 2020-04-27 LAB — RENIN PLAS-CCNC: <0.1 NG/ML/HR

## 2020-04-28 ENCOUNTER — AMBULATORY - HEALTHEAST (OUTPATIENT)
Dept: INTERNAL MEDICINE | Facility: CLINIC | Age: 60
End: 2020-04-28

## 2020-04-28 ENCOUNTER — COMMUNICATION - HEALTHEAST (OUTPATIENT)
Dept: ADMINISTRATIVE | Facility: HOSPITAL | Age: 60
End: 2020-04-28

## 2020-04-28 DIAGNOSIS — I10 ESSENTIAL HYPERTENSION: ICD-10-CM

## 2020-05-06 ENCOUNTER — COMMUNICATION - HEALTHEAST (OUTPATIENT)
Dept: INTERNAL MEDICINE | Facility: CLINIC | Age: 60
End: 2020-05-06

## 2020-05-06 ENCOUNTER — AMBULATORY - HEALTHEAST (OUTPATIENT)
Dept: INFUSION THERAPY | Facility: HOSPITAL | Age: 60
End: 2020-05-06

## 2020-05-06 DIAGNOSIS — I10 ESSENTIAL HYPERTENSION: ICD-10-CM

## 2020-05-06 LAB
ANION GAP SERPL CALCULATED.3IONS-SCNC: 8 MMOL/L (ref 5–18)
BUN SERPL-MCNC: 19 MG/DL (ref 8–22)
CALCIUM SERPL-MCNC: 9.3 MG/DL (ref 8.5–10.5)
CHLORIDE BLD-SCNC: 104 MMOL/L (ref 98–107)
CO2 SERPL-SCNC: 29 MMOL/L (ref 22–31)
CREAT SERPL-MCNC: 0.93 MG/DL (ref 0.7–1.3)
GFR SERPL CREATININE-BSD FRML MDRD: >60 ML/MIN/1.73M2
GLUCOSE BLD-MCNC: 131 MG/DL (ref 70–125)
POTASSIUM BLD-SCNC: 3.8 MMOL/L (ref 3.5–5)
SODIUM SERPL-SCNC: 141 MMOL/L (ref 136–145)

## 2020-05-14 ENCOUNTER — RECORDS - HEALTHEAST (OUTPATIENT)
Dept: ADMINISTRATIVE | Facility: OTHER | Age: 60
End: 2020-05-14

## 2020-05-14 LAB
ALT SERPL W/O P-5'-P-CCNC: 22 U/L (ref 7–55)
AST SERPL-CCNC: 18 U/L (ref 8–48)
CREAT SERPL-MCNC: 0.99 MG/DL (ref 0.74–1.35)
GFR ESTIMATE EXT - HISTORICAL: 81 ML/MIN/1.73M2
GFR ESTIMATE, IF BLACK EXT - HISTORICAL: >90 ML/MIN/1.73M2

## 2020-05-20 LAB
CAP COMMENT: ABNORMAL
LAB AP CHARGES (HE HISTORICAL CONVERSION): ABNORMAL
LAB AP INITIAL CYTO EVAL (HE HISTORICAL CONVERSION): ABNORMAL
LAB MED GENERAL PATH INTERP (HE HISTORICAL CONVERSION): ABNORMAL
PATH REPORT.ADDENDUM SPEC: ABNORMAL
PATH REPORT.COMMENTS IMP SPEC: ABNORMAL
PATH REPORT.COMMENTS IMP SPEC: ABNORMAL
PATH REPORT.FINAL DX SPEC: ABNORMAL
PATH REPORT.MICROSCOPIC SPEC OTHER STN: ABNORMAL
PATH REPORT.RELEVANT HX SPEC: ABNORMAL
SPECIMEN DESCRIPTION: ABNORMAL

## 2020-06-02 ENCOUNTER — AMBULATORY - HEALTHEAST (OUTPATIENT)
Dept: INFUSION THERAPY | Facility: HOSPITAL | Age: 60
End: 2020-06-02

## 2020-06-02 ENCOUNTER — AMBULATORY - HEALTHEAST (OUTPATIENT)
Dept: ONCOLOGY | Facility: HOSPITAL | Age: 60
End: 2020-06-02

## 2020-06-02 ENCOUNTER — COMMUNICATION - HEALTHEAST (OUTPATIENT)
Dept: ONCOLOGY | Facility: HOSPITAL | Age: 60
End: 2020-06-02

## 2020-06-02 DIAGNOSIS — C61 PROSTATE CANCER METASTATIC TO INTRAPELVIC LYMPH NODE (H): ICD-10-CM

## 2020-06-02 DIAGNOSIS — E78.00 ELEVATED CHOLESTEROL: ICD-10-CM

## 2020-06-02 DIAGNOSIS — C77.5 PROSTATE CANCER METASTATIC TO INTRAPELVIC LYMPH NODE (H): ICD-10-CM

## 2020-06-02 LAB
ALBUMIN SERPL-MCNC: 3.7 G/DL (ref 3.5–5)
ALP SERPL-CCNC: 65 U/L (ref 45–120)
ALT SERPL W P-5'-P-CCNC: 18 U/L (ref 0–45)
ANION GAP SERPL CALCULATED.3IONS-SCNC: 7 MMOL/L (ref 5–18)
AST SERPL W P-5'-P-CCNC: 16 U/L (ref 0–40)
BILIRUB SERPL-MCNC: 0.3 MG/DL (ref 0–1)
BUN SERPL-MCNC: 22 MG/DL (ref 8–22)
CALCIUM SERPL-MCNC: 9.1 MG/DL (ref 8.5–10.5)
CHLORIDE BLD-SCNC: 106 MMOL/L (ref 98–107)
CHOLEST SERPL-MCNC: 226 MG/DL
CO2 SERPL-SCNC: 28 MMOL/L (ref 22–31)
CREAT SERPL-MCNC: 1.02 MG/DL (ref 0.7–1.3)
FASTING STATUS PATIENT QL REPORTED: ABNORMAL
GFR SERPL CREATININE-BSD FRML MDRD: >60 ML/MIN/1.73M2
GLUCOSE BLD-MCNC: 150 MG/DL (ref 70–125)
HDLC SERPL-MCNC: 46 MG/DL
LDLC SERPL CALC-MCNC: 146 MG/DL
POTASSIUM BLD-SCNC: 3.7 MMOL/L (ref 3.5–5)
PROT SERPL-MCNC: 7.2 G/DL (ref 6–8)
SODIUM SERPL-SCNC: 141 MMOL/L (ref 136–145)
TRIGL SERPL-MCNC: 171 MG/DL

## 2020-06-20 ENCOUNTER — COMMUNICATION - HEALTHEAST (OUTPATIENT)
Dept: FAMILY MEDICINE | Facility: CLINIC | Age: 60
End: 2020-06-20

## 2020-06-20 DIAGNOSIS — I10 ESSENTIAL HYPERTENSION: ICD-10-CM

## 2020-07-15 ENCOUNTER — INFUSION - HEALTHEAST (OUTPATIENT)
Dept: INFUSION THERAPY | Facility: HOSPITAL | Age: 60
End: 2020-07-15

## 2020-07-15 ENCOUNTER — AMBULATORY - HEALTHEAST (OUTPATIENT)
Dept: INFUSION THERAPY | Facility: HOSPITAL | Age: 60
End: 2020-07-15

## 2020-07-15 ENCOUNTER — OFFICE VISIT - HEALTHEAST (OUTPATIENT)
Dept: ONCOLOGY | Facility: HOSPITAL | Age: 60
End: 2020-07-15

## 2020-07-15 DIAGNOSIS — C77.5 PROSTATE CANCER METASTATIC TO INTRAPELVIC LYMPH NODE (H): ICD-10-CM

## 2020-07-15 DIAGNOSIS — C61 PROSTATE CANCER METASTATIC TO INTRAPELVIC LYMPH NODE (H): ICD-10-CM

## 2020-07-15 LAB
ALBUMIN SERPL-MCNC: 3.5 G/DL (ref 3.5–5)
ALP SERPL-CCNC: 59 U/L (ref 45–120)
ALT SERPL W P-5'-P-CCNC: 16 U/L (ref 0–45)
ANION GAP SERPL CALCULATED.3IONS-SCNC: 5 MMOL/L (ref 5–18)
AST SERPL W P-5'-P-CCNC: 13 U/L (ref 0–40)
BASOPHILS # BLD AUTO: 0.1 THOU/UL (ref 0–0.2)
BASOPHILS NFR BLD AUTO: 1 % (ref 0–2)
BILIRUB SERPL-MCNC: 0.2 MG/DL (ref 0–1)
BUN SERPL-MCNC: 17 MG/DL (ref 8–22)
CALCIUM SERPL-MCNC: 8.8 MG/DL (ref 8.5–10.5)
CHLORIDE BLD-SCNC: 106 MMOL/L (ref 98–107)
CO2 SERPL-SCNC: 29 MMOL/L (ref 22–31)
CREAT SERPL-MCNC: 0.89 MG/DL (ref 0.7–1.3)
EOSINOPHIL # BLD AUTO: 0.4 THOU/UL (ref 0–0.4)
EOSINOPHIL NFR BLD AUTO: 9 % (ref 0–6)
ERYTHROCYTE [DISTWIDTH] IN BLOOD BY AUTOMATED COUNT: 13.9 % (ref 11–14.5)
GFR SERPL CREATININE-BSD FRML MDRD: >60 ML/MIN/1.73M2
GLUCOSE BLD-MCNC: 94 MG/DL (ref 70–125)
HCT VFR BLD AUTO: 35.4 % (ref 40–54)
HGB BLD-MCNC: 11.8 G/DL (ref 14–18)
LYMPHOCYTES # BLD AUTO: 0.4 THOU/UL (ref 0.8–4.4)
LYMPHOCYTES NFR BLD AUTO: 9 % (ref 20–40)
MCH RBC QN AUTO: 31.2 PG (ref 27–34)
MCHC RBC AUTO-ENTMCNC: 33.3 G/DL (ref 32–36)
MCV RBC AUTO: 94 FL (ref 80–100)
MONOCYTES # BLD AUTO: 0.8 THOU/UL (ref 0–0.9)
MONOCYTES NFR BLD AUTO: 18 % (ref 2–10)
NEUTROPHILS # BLD AUTO: 2.8 THOU/UL (ref 2–7.7)
NEUTROPHILS NFR BLD AUTO: 64 % (ref 50–70)
PLATELET # BLD AUTO: 305 THOU/UL (ref 140–440)
PMV BLD AUTO: 10.6 FL (ref 8.5–12.5)
POTASSIUM BLD-SCNC: 3.9 MMOL/L (ref 3.5–5)
PROT SERPL-MCNC: 6.7 G/DL (ref 6–8)
RBC # BLD AUTO: 3.78 MILL/UL (ref 4.4–6.2)
SODIUM SERPL-SCNC: 140 MMOL/L (ref 136–145)
WBC: 4.4 THOU/UL (ref 4–11)

## 2020-07-23 ENCOUNTER — AMBULATORY - HEALTHEAST (OUTPATIENT)
Dept: LAB | Facility: HOSPITAL | Age: 60
End: 2020-07-23

## 2020-07-23 DIAGNOSIS — E26.9 HYPERALDOSTERONISM, UNSPECIFIED (H): ICD-10-CM

## 2020-07-23 LAB — POTASSIUM BLD-SCNC: 3.5 MMOL/L (ref 3.5–5)

## 2020-07-27 ENCOUNTER — COMMUNICATION - HEALTHEAST (OUTPATIENT)
Dept: INTERNAL MEDICINE | Facility: CLINIC | Age: 60
End: 2020-07-27

## 2020-07-28 ENCOUNTER — COMMUNICATION - HEALTHEAST (OUTPATIENT)
Dept: ONCOLOGY | Facility: HOSPITAL | Age: 60
End: 2020-07-28

## 2020-07-28 DIAGNOSIS — C61 PROSTATE CANCER METASTATIC TO INTRAPELVIC LYMPH NODE (H): ICD-10-CM

## 2020-07-28 DIAGNOSIS — C77.5 PROSTATE CANCER METASTATIC TO INTRAPELVIC LYMPH NODE (H): ICD-10-CM

## 2020-07-29 ENCOUNTER — OFFICE VISIT - HEALTHEAST (OUTPATIENT)
Dept: INTERNAL MEDICINE | Facility: CLINIC | Age: 60
End: 2020-07-29

## 2020-07-29 DIAGNOSIS — C61 PROSTATE CANCER METASTATIC TO INTRAPELVIC LYMPH NODE (H): ICD-10-CM

## 2020-07-29 DIAGNOSIS — C77.5 PROSTATE CANCER METASTATIC TO INTRAPELVIC LYMPH NODE (H): ICD-10-CM

## 2020-07-29 DIAGNOSIS — L23.7 CONTACT DERMATITIS DUE TO POISON IVY: ICD-10-CM

## 2020-07-29 DIAGNOSIS — I15.9 SECONDARY HYPERTENSION: ICD-10-CM

## 2020-07-29 DIAGNOSIS — E26.01: ICD-10-CM

## 2020-07-29 LAB — POTASSIUM BLD-SCNC: 4.3 MMOL/L (ref 3.5–5)

## 2020-07-29 ASSESSMENT — MIFFLIN-ST. JEOR: SCORE: 1970.4

## 2020-08-14 ENCOUNTER — RECORDS - HEALTHEAST (OUTPATIENT)
Dept: HEALTH INFORMATION MANAGEMENT | Facility: CLINIC | Age: 60
End: 2020-08-14

## 2020-08-18 ENCOUNTER — AMBULATORY - HEALTHEAST (OUTPATIENT)
Dept: LAB | Facility: HOSPITAL | Age: 60
End: 2020-08-18

## 2020-08-18 DIAGNOSIS — E26.9 HYPERALDOSTERONISM (H): ICD-10-CM

## 2020-08-18 LAB — POTASSIUM BLD-SCNC: 3.9 MMOL/L (ref 3.5–5)

## 2020-08-20 LAB — RENIN PLAS-CCNC: 0.4 NG/ML/HR

## 2020-09-02 ENCOUNTER — AMBULATORY - HEALTHEAST (OUTPATIENT)
Dept: LAB | Facility: HOSPITAL | Age: 60
End: 2020-09-02

## 2020-09-02 DIAGNOSIS — E26.9 HYPERALDOSTERONISM (H): ICD-10-CM

## 2020-09-02 LAB
CREAT SERPL-MCNC: 1.15 MG/DL (ref 0.7–1.3)
GFR SERPL CREATININE-BSD FRML MDRD: >60 ML/MIN/1.73M2
POTASSIUM BLD-SCNC: 3.9 MMOL/L (ref 3.5–5)

## 2020-09-10 LAB — RENIN PLAS-CCNC: <0.1 NG/ML/HR

## 2020-10-07 ENCOUNTER — COMMUNICATION - HEALTHEAST (OUTPATIENT)
Dept: INTERNAL MEDICINE | Facility: CLINIC | Age: 60
End: 2020-10-07

## 2020-10-07 DIAGNOSIS — I10 ESSENTIAL HYPERTENSION: ICD-10-CM

## 2020-10-07 DIAGNOSIS — E78.00 PURE HYPERCHOLESTEROLEMIA: ICD-10-CM

## 2020-10-08 ENCOUNTER — COMMUNICATION - HEALTHEAST (OUTPATIENT)
Dept: INTERNAL MEDICINE | Facility: CLINIC | Age: 60
End: 2020-10-08

## 2020-10-08 RX ORDER — SIMVASTATIN 40 MG
40 TABLET ORAL AT BEDTIME
Qty: 90 TABLET | Refills: 2 | Status: SHIPPED | OUTPATIENT
Start: 2020-10-08 | End: 2021-08-11

## 2020-10-20 ENCOUNTER — AMBULATORY - HEALTHEAST (OUTPATIENT)
Dept: INFUSION THERAPY | Facility: HOSPITAL | Age: 60
End: 2020-10-20

## 2020-10-20 ENCOUNTER — AMBULATORY - HEALTHEAST (OUTPATIENT)
Dept: ONCOLOGY | Facility: HOSPITAL | Age: 60
End: 2020-10-20

## 2020-10-20 DIAGNOSIS — E78.00 ELEVATED CHOLESTEROL: ICD-10-CM

## 2020-10-20 DIAGNOSIS — C61 PROSTATE CANCER METASTATIC TO INTRAPELVIC LYMPH NODE (H): ICD-10-CM

## 2020-10-20 DIAGNOSIS — C77.5 PROSTATE CANCER METASTATIC TO INTRAPELVIC LYMPH NODE (H): ICD-10-CM

## 2020-10-20 LAB
ANION GAP SERPL CALCULATED.3IONS-SCNC: 8 MMOL/L (ref 5–18)
BASOPHILS # BLD AUTO: 0.1 THOU/UL (ref 0–0.2)
BASOPHILS NFR BLD AUTO: 1 % (ref 0–2)
BUN SERPL-MCNC: 38 MG/DL (ref 8–22)
CALCIUM SERPL-MCNC: 9.6 MG/DL (ref 8.5–10.5)
CHLORIDE BLD-SCNC: 104 MMOL/L (ref 98–107)
CHOLEST SERPL-MCNC: 156 MG/DL
CO2 SERPL-SCNC: 26 MMOL/L (ref 22–31)
CREAT SERPL-MCNC: 1.48 MG/DL (ref 0.7–1.3)
EOSINOPHIL # BLD AUTO: 0.4 THOU/UL (ref 0–0.4)
EOSINOPHIL NFR BLD AUTO: 6 % (ref 0–6)
ERYTHROCYTE [DISTWIDTH] IN BLOOD BY AUTOMATED COUNT: 13.1 % (ref 11–14.5)
FASTING STATUS PATIENT QL REPORTED: YES
GFR SERPL CREATININE-BSD FRML MDRD: 48 ML/MIN/1.73M2
GLUCOSE BLD-MCNC: 118 MG/DL (ref 70–125)
HCT VFR BLD AUTO: 35 % (ref 40–54)
HDLC SERPL-MCNC: 39 MG/DL
HGB BLD-MCNC: 11.2 G/DL (ref 14–18)
IMM GRANULOCYTES # BLD: 0 THOU/UL
IMM GRANULOCYTES NFR BLD: 1 %
LDLC SERPL CALC-MCNC: 94 MG/DL
LYMPHOCYTES # BLD AUTO: 0.6 THOU/UL (ref 0.8–4.4)
LYMPHOCYTES NFR BLD AUTO: 11 % (ref 20–40)
MCH RBC QN AUTO: 31.9 PG (ref 27–34)
MCHC RBC AUTO-ENTMCNC: 32 G/DL (ref 32–36)
MCV RBC AUTO: 100 FL (ref 80–100)
MONOCYTES # BLD AUTO: 0.7 THOU/UL (ref 0–0.9)
MONOCYTES NFR BLD AUTO: 12 % (ref 2–10)
NEUTROPHILS # BLD AUTO: 4.2 THOU/UL (ref 2–7.7)
NEUTROPHILS NFR BLD AUTO: 70 % (ref 50–70)
PLATELET # BLD AUTO: 385 THOU/UL (ref 140–440)
PMV BLD AUTO: 10.1 FL (ref 8.5–12.5)
POTASSIUM BLD-SCNC: 5.3 MMOL/L (ref 3.5–5)
PSA SERPL-MCNC: <0.1 NG/ML (ref 0–4.5)
RBC # BLD AUTO: 3.51 MILL/UL (ref 4.4–6.2)
SODIUM SERPL-SCNC: 138 MMOL/L (ref 136–145)
TRIGL SERPL-MCNC: 116 MG/DL
TSH SERPL DL<=0.005 MIU/L-ACNC: 3.35 UIU/ML (ref 0.3–5)
WBC: 6 THOU/UL (ref 4–11)

## 2020-10-22 ENCOUNTER — AMBULATORY - HEALTHEAST (OUTPATIENT)
Dept: LAB | Facility: HOSPITAL | Age: 60
End: 2020-10-22

## 2020-10-22 ENCOUNTER — OFFICE VISIT - HEALTHEAST (OUTPATIENT)
Dept: ONCOLOGY | Facility: HOSPITAL | Age: 60
End: 2020-10-22

## 2020-10-22 ENCOUNTER — COMMUNICATION - HEALTHEAST (OUTPATIENT)
Dept: ONCOLOGY | Facility: HOSPITAL | Age: 60
End: 2020-10-22

## 2020-10-22 DIAGNOSIS — C61 PROSTATE CANCER METASTATIC TO INTRAPELVIC LYMPH NODE (H): ICD-10-CM

## 2020-10-22 DIAGNOSIS — C77.5 PROSTATE CANCER METASTATIC TO INTRAPELVIC LYMPH NODE (H): ICD-10-CM

## 2020-10-26 LAB
SHBG SERPL-SCNC: 30 NMOL/L (ref 11–80)
TESTOST FREE SERPL-MCNC: 0.13 NG/DL (ref 4.7–24.4)
TESTOST SERPL-MCNC: 7 NG/DL (ref 240–950)

## 2020-10-28 ENCOUNTER — COMMUNICATION - HEALTHEAST (OUTPATIENT)
Dept: ONCOLOGY | Facility: HOSPITAL | Age: 60
End: 2020-10-28

## 2020-10-29 ENCOUNTER — COMMUNICATION - HEALTHEAST (OUTPATIENT)
Dept: ONCOLOGY | Facility: CLINIC | Age: 60
End: 2020-10-29

## 2020-11-05 ENCOUNTER — OFFICE VISIT - HEALTHEAST (OUTPATIENT)
Dept: INTERNAL MEDICINE | Facility: CLINIC | Age: 60
End: 2020-11-05

## 2020-11-05 ENCOUNTER — COMMUNICATION - HEALTHEAST (OUTPATIENT)
Dept: ONCOLOGY | Facility: HOSPITAL | Age: 60
End: 2020-11-05

## 2020-11-05 DIAGNOSIS — C61 PROSTATE CANCER METASTATIC TO INTRAPELVIC LYMPH NODE (H): ICD-10-CM

## 2020-11-05 DIAGNOSIS — E66.01 MORBID OBESITY (H): ICD-10-CM

## 2020-11-05 DIAGNOSIS — I25.83 CORONARY ARTERY DISEASE DUE TO LIPID RICH PLAQUE: ICD-10-CM

## 2020-11-05 DIAGNOSIS — R73.09 OTHER ABNORMAL GLUCOSE: ICD-10-CM

## 2020-11-05 DIAGNOSIS — G47.33 OSA (OBSTRUCTIVE SLEEP APNEA): ICD-10-CM

## 2020-11-05 DIAGNOSIS — N18.30 STAGE 3 CHRONIC KIDNEY DISEASE, UNSPECIFIED WHETHER STAGE 3A OR 3B CKD (H): ICD-10-CM

## 2020-11-05 DIAGNOSIS — E26.9 HYPERALDOSTERONISM (H): ICD-10-CM

## 2020-11-05 DIAGNOSIS — I15.9 SECONDARY HYPERTENSION: ICD-10-CM

## 2020-11-05 DIAGNOSIS — Z11.59 NEED FOR HEPATITIS C SCREENING TEST: ICD-10-CM

## 2020-11-05 DIAGNOSIS — I25.10 CORONARY ARTERY DISEASE DUE TO LIPID RICH PLAQUE: ICD-10-CM

## 2020-11-05 DIAGNOSIS — C77.5 PROSTATE CANCER METASTATIC TO INTRAPELVIC LYMPH NODE (H): ICD-10-CM

## 2020-11-05 DIAGNOSIS — M25.50 ARTHRALGIA, UNSPECIFIED JOINT: ICD-10-CM

## 2020-11-05 DIAGNOSIS — Z00.00 ROUTINE GENERAL MEDICAL EXAMINATION AT A HEALTH CARE FACILITY: ICD-10-CM

## 2020-11-05 DIAGNOSIS — I45.10 RBBB: ICD-10-CM

## 2020-11-05 DIAGNOSIS — D35.01 ADRENAL ADENOMA, RIGHT: ICD-10-CM

## 2020-11-05 DIAGNOSIS — Z90.81 ASPLENIA AFTER SURGICAL PROCEDURE: ICD-10-CM

## 2020-11-05 LAB
ANION GAP SERPL CALCULATED.3IONS-SCNC: 10 MMOL/L (ref 5–18)
BUN SERPL-MCNC: 50 MG/DL (ref 8–22)
C REACTIVE PROTEIN LHE: 0.4 MG/DL (ref 0–0.8)
CALCIUM SERPL-MCNC: 10 MG/DL (ref 8.5–10.5)
CHLORIDE BLD-SCNC: 109 MMOL/L (ref 98–107)
CK SERPL-CCNC: 63 U/L (ref 30–190)
CO2 SERPL-SCNC: 22 MMOL/L (ref 22–31)
CREAT SERPL-MCNC: 1.54 MG/DL (ref 0.7–1.3)
ERYTHROCYTE [SEDIMENTATION RATE] IN BLOOD BY WESTERGREN METHOD: 36 MM/HR (ref 0–15)
GFR SERPL CREATININE-BSD FRML MDRD: 46 ML/MIN/1.73M2
GLUCOSE BLD-MCNC: 116 MG/DL (ref 70–125)
HBA1C MFR BLD: 6.3 %
POTASSIUM BLD-SCNC: 5.6 MMOL/L (ref 3.5–5)
SODIUM SERPL-SCNC: 141 MMOL/L (ref 136–145)
TSH SERPL DL<=0.005 MIU/L-ACNC: 3.55 UIU/ML (ref 0.3–5)

## 2020-11-05 ASSESSMENT — MIFFLIN-ST. JEOR: SCORE: 1979.47

## 2020-11-06 ENCOUNTER — AMBULATORY - HEALTHEAST (OUTPATIENT)
Dept: INTERNAL MEDICINE | Facility: CLINIC | Age: 60
End: 2020-11-06

## 2020-11-06 ENCOUNTER — COMMUNICATION - HEALTHEAST (OUTPATIENT)
Dept: INTERNAL MEDICINE | Facility: CLINIC | Age: 60
End: 2020-11-06

## 2020-11-06 DIAGNOSIS — I10 ESSENTIAL HYPERTENSION: ICD-10-CM

## 2020-11-06 DIAGNOSIS — A69.20 LYME DISEASE: ICD-10-CM

## 2020-11-06 LAB
B BURGDOR IGG+IGM SER QL: 3.51 INDEX VALUE
HCV AB SERPL QL IA: NEGATIVE

## 2020-11-09 LAB
ATRIAL RATE - MUSE: 54 BPM
DIASTOLIC BLOOD PRESSURE - MUSE: NORMAL
INTERPRETATION ECG - MUSE: NORMAL
P AXIS - MUSE: -4 DEGREES
PR INTERVAL - MUSE: 110 MS
QRS DURATION - MUSE: 150 MS
QT - MUSE: 470 MS
QTC - MUSE: 445 MS
R AXIS - MUSE: -27 DEGREES
SYSTOLIC BLOOD PRESSURE - MUSE: NORMAL
T AXIS - MUSE: 13 DEGREES
VENTRICULAR RATE- MUSE: 54 BPM

## 2020-11-10 LAB — ANA SER QL: 0.4 U

## 2020-11-13 LAB
B BURGDOR AB SER-IMP: ABNORMAL
LYME AB IGG BAND(S): ABNORMAL
LYME AB IGM BAND(S): ABNORMAL
LYME IGG BLOT: NEGATIVE
LYME IGM BLOT: POSITIVE

## 2020-12-10 ENCOUNTER — COMMUNICATION - HEALTHEAST (OUTPATIENT)
Dept: INTERNAL MEDICINE | Facility: CLINIC | Age: 60
End: 2020-12-10

## 2020-12-16 ENCOUNTER — COMMUNICATION - HEALTHEAST (OUTPATIENT)
Dept: ONCOLOGY | Facility: HOSPITAL | Age: 60
End: 2020-12-16

## 2020-12-16 ENCOUNTER — AMBULATORY - HEALTHEAST (OUTPATIENT)
Dept: INFUSION THERAPY | Facility: HOSPITAL | Age: 60
End: 2020-12-16

## 2020-12-16 ENCOUNTER — COMMUNICATION - HEALTHEAST (OUTPATIENT)
Dept: INTERNAL MEDICINE | Facility: CLINIC | Age: 60
End: 2020-12-16

## 2020-12-16 ENCOUNTER — HOSPITAL ENCOUNTER (OUTPATIENT)
Dept: PET IMAGING | Facility: HOSPITAL | Age: 60
Discharge: HOME | End: 2020-12-16

## 2020-12-16 DIAGNOSIS — C77.5 PROSTATE CANCER METASTATIC TO INTRAPELVIC LYMPH NODE (H): ICD-10-CM

## 2020-12-16 DIAGNOSIS — C61 PROSTATE CANCER METASTATIC TO INTRAPELVIC LYMPH NODE (H): ICD-10-CM

## 2020-12-16 LAB
ALBUMIN SERPL-MCNC: 4 G/DL (ref 3.5–5)
ALP SERPL-CCNC: 67 U/L (ref 45–120)
ALT SERPL W P-5'-P-CCNC: 17 U/L (ref 0–45)
ANION GAP SERPL CALCULATED.3IONS-SCNC: 5 MMOL/L (ref 5–18)
AST SERPL W P-5'-P-CCNC: 14 U/L (ref 0–40)
BASOPHILS # BLD AUTO: 0.1 THOU/UL (ref 0–0.2)
BASOPHILS NFR BLD AUTO: 1 % (ref 0–2)
BILIRUB SERPL-MCNC: 0.4 MG/DL (ref 0–1)
BUN SERPL-MCNC: 69 MG/DL (ref 8–22)
CALCIUM SERPL-MCNC: 9.8 MG/DL (ref 8.5–10.5)
CHLORIDE BLD-SCNC: 104 MMOL/L (ref 98–107)
CO2 SERPL-SCNC: 27 MMOL/L (ref 22–31)
CREAT SERPL-MCNC: 2.31 MG/DL (ref 0.7–1.3)
EOSINOPHIL # BLD AUTO: 0.4 THOU/UL (ref 0–0.4)
EOSINOPHIL NFR BLD AUTO: 7 % (ref 0–6)
ERYTHROCYTE [DISTWIDTH] IN BLOOD BY AUTOMATED COUNT: 13.2 % (ref 11–14.5)
GFR SERPL CREATININE-BSD FRML MDRD: 29 ML/MIN/1.73M2
GLUCOSE BLD-MCNC: 127 MG/DL (ref 70–125)
HCT VFR BLD AUTO: 36.6 % (ref 40–54)
HGB BLD-MCNC: 11.8 G/DL (ref 14–18)
IMM GRANULOCYTES # BLD: 0.1 THOU/UL
IMM GRANULOCYTES NFR BLD: 1 %
LYMPHOCYTES # BLD AUTO: 0.6 THOU/UL (ref 0.8–4.4)
LYMPHOCYTES NFR BLD AUTO: 10 % (ref 20–40)
MCH RBC QN AUTO: 31.4 PG (ref 27–34)
MCHC RBC AUTO-ENTMCNC: 32.2 G/DL (ref 32–36)
MCV RBC AUTO: 97 FL (ref 80–100)
MONOCYTES # BLD AUTO: 0.5 THOU/UL (ref 0–0.9)
MONOCYTES NFR BLD AUTO: 10 % (ref 2–10)
NEUTROPHILS # BLD AUTO: 3.8 THOU/UL (ref 2–7.7)
NEUTROPHILS NFR BLD AUTO: 71 % (ref 50–70)
PLATELET # BLD AUTO: 395 THOU/UL (ref 140–440)
PMV BLD AUTO: 10.8 FL (ref 8.5–12.5)
POTASSIUM BLD-SCNC: 6.6 MMOL/L (ref 3.5–5)
PROT SERPL-MCNC: 7.8 G/DL (ref 6–8)
PSA SERPL-MCNC: <0.1 NG/ML (ref 0–4.5)
RBC # BLD AUTO: 3.76 MILL/UL (ref 4.4–6.2)
SODIUM SERPL-SCNC: 136 MMOL/L (ref 136–145)
WBC: 5.4 THOU/UL (ref 4–11)

## 2020-12-16 ASSESSMENT — MIFFLIN-ST. JEOR: SCORE: 1996.48

## 2020-12-21 ENCOUNTER — OFFICE VISIT - HEALTHEAST (OUTPATIENT)
Dept: INTERNAL MEDICINE | Facility: CLINIC | Age: 60
End: 2020-12-21

## 2020-12-21 DIAGNOSIS — A69.20 LYME DISEASE: ICD-10-CM

## 2020-12-21 DIAGNOSIS — E87.5 HYPERKALEMIA: ICD-10-CM

## 2020-12-21 DIAGNOSIS — N18.4 CKD (CHRONIC KIDNEY DISEASE) STAGE 4, GFR 15-29 ML/MIN (H): ICD-10-CM

## 2020-12-21 DIAGNOSIS — C61 PROSTATE CANCER METASTATIC TO INTRAPELVIC LYMPH NODE (H): ICD-10-CM

## 2020-12-21 DIAGNOSIS — C77.5 PROSTATE CANCER METASTATIC TO INTRAPELVIC LYMPH NODE (H): ICD-10-CM

## 2020-12-21 DIAGNOSIS — I10 ESSENTIAL HYPERTENSION: ICD-10-CM

## 2020-12-21 DIAGNOSIS — E26.9 HYPERALDOSTERONISM (H): ICD-10-CM

## 2020-12-21 DIAGNOSIS — D35.01 ADRENAL ADENOMA, RIGHT: ICD-10-CM

## 2020-12-23 ENCOUNTER — AMBULATORY - HEALTHEAST (OUTPATIENT)
Dept: INTERNAL MEDICINE | Facility: CLINIC | Age: 60
End: 2020-12-23

## 2020-12-23 ENCOUNTER — COMMUNICATION - HEALTHEAST (OUTPATIENT)
Dept: INTERNAL MEDICINE | Facility: CLINIC | Age: 60
End: 2020-12-23

## 2020-12-23 DIAGNOSIS — E87.5 HYPERKALEMIA: ICD-10-CM

## 2020-12-30 ENCOUNTER — INFUSION - HEALTHEAST (OUTPATIENT)
Dept: INFUSION THERAPY | Facility: HOSPITAL | Age: 60
End: 2020-12-30

## 2020-12-30 ENCOUNTER — OFFICE VISIT - HEALTHEAST (OUTPATIENT)
Dept: ONCOLOGY | Facility: HOSPITAL | Age: 60
End: 2020-12-30

## 2020-12-30 ENCOUNTER — AMBULATORY - HEALTHEAST (OUTPATIENT)
Dept: LAB | Facility: CLINIC | Age: 60
End: 2020-12-30

## 2020-12-30 DIAGNOSIS — C61 PROSTATE CANCER METASTATIC TO INTRAPELVIC LYMPH NODE (H): ICD-10-CM

## 2020-12-30 DIAGNOSIS — A69.20 LYME DISEASE: ICD-10-CM

## 2020-12-30 DIAGNOSIS — C77.5 PROSTATE CANCER METASTATIC TO INTRAPELVIC LYMPH NODE (H): ICD-10-CM

## 2020-12-30 DIAGNOSIS — E87.5 HYPERKALEMIA: ICD-10-CM

## 2020-12-30 LAB
ALBUMIN SERPL-MCNC: 3.5 G/DL (ref 3.5–5)
ALBUMIN UR-MCNC: ABNORMAL MG/DL
ALP SERPL-CCNC: 64 U/L (ref 45–120)
ALT SERPL W P-5'-P-CCNC: 26 U/L (ref 0–45)
ANION GAP SERPL CALCULATED.3IONS-SCNC: 10 MMOL/L (ref 5–18)
APPEARANCE UR: CLEAR
AST SERPL W P-5'-P-CCNC: 18 U/L (ref 0–40)
BACTERIA #/AREA URNS HPF: ABNORMAL HPF
BILIRUB SERPL-MCNC: 0.2 MG/DL (ref 0–1)
BILIRUB UR QL STRIP: NEGATIVE
BUN SERPL-MCNC: 31 MG/DL (ref 8–22)
CALCIUM SERPL-MCNC: 9.5 MG/DL (ref 8.5–10.5)
CHLORIDE BLD-SCNC: 102 MMOL/L (ref 98–107)
CO2 SERPL-SCNC: 30 MMOL/L (ref 22–31)
COLOR UR AUTO: YELLOW
CREAT SERPL-MCNC: 1.58 MG/DL (ref 0.7–1.3)
GFR SERPL CREATININE-BSD FRML MDRD: 45 ML/MIN/1.73M2
GLUCOSE BLD-MCNC: 101 MG/DL (ref 70–125)
GLUCOSE UR STRIP-MCNC: NEGATIVE MG/DL
HGB UR QL STRIP: NEGATIVE
KETONES UR STRIP-MCNC: NEGATIVE MG/DL
LEUKOCYTE ESTERASE UR QL STRIP: NEGATIVE
NITRATE UR QL: NEGATIVE
PH UR STRIP: 5.5 [PH] (ref 5–8)
POTASSIUM BLD-SCNC: 4 MMOL/L (ref 3.5–5)
POTASSIUM UR-SCNC: 58.5 MMOL/L
PROT SERPL-MCNC: 6.8 G/DL (ref 6–8)
RBC #/AREA URNS AUTO: ABNORMAL HPF
SODIUM SERPL-SCNC: 142 MMOL/L (ref 136–145)
SODIUM UR-SCNC: 41 MMOL/L
SP GR UR STRIP: 1.02 (ref 1–1.03)
SQUAMOUS #/AREA URNS AUTO: ABNORMAL LPF
UROBILINOGEN UR STRIP-ACNC: ABNORMAL
WBC #/AREA URNS AUTO: ABNORMAL HPF

## 2020-12-31 LAB — B BURGDOR IGG+IGM SER QL: 3.16 INDEX VALUE

## 2021-01-04 ENCOUNTER — COMMUNICATION - HEALTHEAST (OUTPATIENT)
Dept: INTERNAL MEDICINE | Facility: CLINIC | Age: 61
End: 2021-01-04

## 2021-01-11 ENCOUNTER — COMMUNICATION - HEALTHEAST (OUTPATIENT)
Dept: INTERNAL MEDICINE | Facility: CLINIC | Age: 61
End: 2021-01-11

## 2021-01-15 ENCOUNTER — OFFICE VISIT - HEALTHEAST (OUTPATIENT)
Dept: INTERNAL MEDICINE | Facility: CLINIC | Age: 61
End: 2021-01-15

## 2021-01-15 DIAGNOSIS — E66.01 MORBID OBESITY (H): ICD-10-CM

## 2021-01-15 DIAGNOSIS — I10 ESSENTIAL HYPERTENSION: ICD-10-CM

## 2021-01-15 DIAGNOSIS — M47.812 OSTEOARTHRITIS OF CERVICAL SPINE, UNSPECIFIED SPINAL OSTEOARTHRITIS COMPLICATION STATUS: ICD-10-CM

## 2021-01-15 DIAGNOSIS — E26.9 HYPERALDOSTERONISM (H): ICD-10-CM

## 2021-01-15 DIAGNOSIS — C77.5 PROSTATE CANCER METASTATIC TO INTRAPELVIC LYMPH NODE (H): ICD-10-CM

## 2021-01-15 DIAGNOSIS — N18.4 CKD (CHRONIC KIDNEY DISEASE) STAGE 4, GFR 15-29 ML/MIN (H): ICD-10-CM

## 2021-01-15 DIAGNOSIS — C61 PROSTATE CANCER METASTATIC TO INTRAPELVIC LYMPH NODE (H): ICD-10-CM

## 2021-01-15 DIAGNOSIS — N17.9 ACUTE KIDNEY INJURY (H): ICD-10-CM

## 2021-01-15 DIAGNOSIS — E87.5 HYPERKALEMIA: ICD-10-CM

## 2021-01-15 LAB
ANION GAP SERPL CALCULATED.3IONS-SCNC: 8 MMOL/L (ref 5–18)
BUN SERPL-MCNC: 16 MG/DL (ref 8–22)
CALCIUM SERPL-MCNC: 9.6 MG/DL (ref 8.5–10.5)
CHLORIDE BLD-SCNC: 99 MMOL/L (ref 98–107)
CO2 SERPL-SCNC: 36 MMOL/L (ref 22–31)
CREAT SERPL-MCNC: 1.31 MG/DL (ref 0.7–1.3)
GFR SERPL CREATININE-BSD FRML MDRD: 55 ML/MIN/1.73M2
GLUCOSE BLD-MCNC: 106 MG/DL (ref 70–125)
POTASSIUM BLD-SCNC: 3.4 MMOL/L (ref 3.5–5)
SODIUM SERPL-SCNC: 143 MMOL/L (ref 136–145)

## 2021-01-15 RX ORDER — CHLORTHALIDONE 25 MG/1
25 TABLET ORAL DAILY
Qty: 90 TABLET | Refills: 3 | Status: SHIPPED
Start: 2021-01-15 | End: 2021-08-31

## 2021-01-15 RX ORDER — CARVEDILOL 25 MG/1
25 TABLET ORAL 2 TIMES DAILY WITH MEALS
Qty: 180 TABLET | Refills: 3 | Status: SHIPPED | OUTPATIENT
Start: 2021-01-15 | End: 2021-08-31

## 2021-01-15 ASSESSMENT — MIFFLIN-ST. JEOR: SCORE: 2019.16

## 2021-01-18 ENCOUNTER — AMBULATORY - HEALTHEAST (OUTPATIENT)
Dept: INTERNAL MEDICINE | Facility: CLINIC | Age: 61
End: 2021-01-18

## 2021-01-18 ENCOUNTER — COMMUNICATION - HEALTHEAST (OUTPATIENT)
Dept: PHYSICAL MEDICINE AND REHAB | Facility: CLINIC | Age: 61
End: 2021-01-18

## 2021-01-18 ENCOUNTER — COMMUNICATION - HEALTHEAST (OUTPATIENT)
Dept: INTERNAL MEDICINE | Facility: CLINIC | Age: 61
End: 2021-01-18

## 2021-01-18 DIAGNOSIS — A69.20 LYME DISEASE: ICD-10-CM

## 2021-01-20 ENCOUNTER — HOSPITAL ENCOUNTER (OUTPATIENT)
Dept: PHYSICAL MEDICINE AND REHAB | Facility: CLINIC | Age: 61
Discharge: HOME | End: 2021-01-20
Attending: INTERNAL MEDICINE

## 2021-01-20 DIAGNOSIS — M54.16 LUMBAR RADICULAR PAIN: ICD-10-CM

## 2021-01-20 DIAGNOSIS — M79.18 MYOFASCIAL PAIN: ICD-10-CM

## 2021-01-20 DIAGNOSIS — M48.061 SPINAL STENOSIS OF LUMBAR REGION WITHOUT NEUROGENIC CLAUDICATION: ICD-10-CM

## 2021-01-20 DIAGNOSIS — M51.369 DDD (DEGENERATIVE DISC DISEASE), LUMBAR: ICD-10-CM

## 2021-01-20 DIAGNOSIS — M54.50 LUMBAR SPINE PAIN: ICD-10-CM

## 2021-01-20 ASSESSMENT — MIFFLIN-ST. JEOR: SCORE: 2019.16

## 2021-01-22 ENCOUNTER — AMBULATORY - HEALTHEAST (OUTPATIENT)
Dept: LAB | Facility: HOSPITAL | Age: 61
End: 2021-01-22

## 2021-01-22 DIAGNOSIS — I10 ESSENTIAL HYPERTENSION: ICD-10-CM

## 2021-01-22 LAB
ANION GAP SERPL CALCULATED.3IONS-SCNC: 7 MMOL/L (ref 5–18)
BUN SERPL-MCNC: 28 MG/DL (ref 8–22)
CALCIUM SERPL-MCNC: 9.6 MG/DL (ref 8.5–10.5)
CHLORIDE BLD-SCNC: 105 MMOL/L (ref 98–107)
CO2 SERPL-SCNC: 31 MMOL/L (ref 22–31)
CREAT SERPL-MCNC: 1.84 MG/DL (ref 0.7–1.3)
GFR SERPL CREATININE-BSD FRML MDRD: 37 ML/MIN/1.73M2
GLUCOSE BLD-MCNC: 106 MG/DL (ref 70–125)
POTASSIUM BLD-SCNC: 4.3 MMOL/L (ref 3.5–5)
SODIUM SERPL-SCNC: 143 MMOL/L (ref 136–145)

## 2021-01-25 ENCOUNTER — COMMUNICATION - HEALTHEAST (OUTPATIENT)
Dept: INTERNAL MEDICINE | Facility: CLINIC | Age: 61
End: 2021-01-25

## 2021-02-02 ENCOUNTER — HOSPITAL ENCOUNTER (OUTPATIENT)
Dept: MRI IMAGING | Facility: HOSPITAL | Age: 61
Discharge: HOME | End: 2021-02-02
Attending: PHYSICAL MEDICINE & REHABILITATION

## 2021-02-02 DIAGNOSIS — M51.369 DDD (DEGENERATIVE DISC DISEASE), LUMBAR: ICD-10-CM

## 2021-02-02 DIAGNOSIS — M48.061 SPINAL STENOSIS OF LUMBAR REGION WITHOUT NEUROGENIC CLAUDICATION: ICD-10-CM

## 2021-02-02 DIAGNOSIS — M54.50 LUMBAR SPINE PAIN: ICD-10-CM

## 2021-02-02 DIAGNOSIS — M54.16 LUMBAR RADICULAR PAIN: ICD-10-CM

## 2021-02-05 ENCOUNTER — AMBULATORY - HEALTHEAST (OUTPATIENT)
Dept: LAB | Facility: HOSPITAL | Age: 61
End: 2021-02-05

## 2021-02-05 ENCOUNTER — HOSPITAL ENCOUNTER (OUTPATIENT)
Dept: PHYSICAL MEDICINE AND REHAB | Facility: CLINIC | Age: 61
Discharge: HOME | End: 2021-02-05
Attending: PHYSICAL MEDICINE & REHABILITATION

## 2021-02-05 DIAGNOSIS — M48.061 SPINAL STENOSIS OF LUMBAR REGION WITHOUT NEUROGENIC CLAUDICATION: ICD-10-CM

## 2021-02-05 DIAGNOSIS — M54.16 LUMBAR RADICULAR PAIN: ICD-10-CM

## 2021-02-05 DIAGNOSIS — M54.50 LUMBAR SPINE PAIN: ICD-10-CM

## 2021-02-05 DIAGNOSIS — M51.369 DDD (DEGENERATIVE DISC DISEASE), LUMBAR: ICD-10-CM

## 2021-02-05 DIAGNOSIS — D17.79 EPIDURAL LIPOMATOSIS: ICD-10-CM

## 2021-02-05 DIAGNOSIS — E26.9 HYPERALDOSTERONISM (H): ICD-10-CM

## 2021-02-05 DIAGNOSIS — M79.18 MYOFASCIAL PAIN: ICD-10-CM

## 2021-02-05 DIAGNOSIS — E66.3 OVERWEIGHT: ICD-10-CM

## 2021-02-05 LAB
POTASSIUM BLD-SCNC: 3.4 MMOL/L (ref 3.5–5)
SODIUM SERPL-SCNC: 142 MMOL/L (ref 136–145)

## 2021-02-07 LAB — ALDOST SERPL-MCNC: 21.7 NG/DL

## 2021-02-10 LAB — RENIN PLAS-CCNC: 0.2 NG/ML/HR

## 2021-03-08 ENCOUNTER — COMMUNICATION - HEALTHEAST (OUTPATIENT)
Dept: INTERNAL MEDICINE | Facility: CLINIC | Age: 61
End: 2021-03-08

## 2021-03-08 DIAGNOSIS — I10 ESSENTIAL HYPERTENSION: ICD-10-CM

## 2021-03-12 ENCOUNTER — AMBULATORY - HEALTHEAST (OUTPATIENT)
Dept: INFUSION THERAPY | Facility: HOSPITAL | Age: 61
End: 2021-03-12

## 2021-03-12 DIAGNOSIS — C61 PROSTATE CANCER METASTATIC TO INTRAPELVIC LYMPH NODE (H): ICD-10-CM

## 2021-03-12 DIAGNOSIS — C77.5 PROSTATE CANCER METASTATIC TO INTRAPELVIC LYMPH NODE (H): ICD-10-CM

## 2021-03-12 DIAGNOSIS — I10 ESSENTIAL HYPERTENSION: ICD-10-CM

## 2021-03-12 LAB
ANION GAP SERPL CALCULATED.3IONS-SCNC: 9 MMOL/L (ref 5–18)
BUN SERPL-MCNC: 31 MG/DL (ref 8–22)
CALCIUM SERPL-MCNC: 9.8 MG/DL (ref 8.5–10.5)
CHLORIDE BLD-SCNC: 105 MMOL/L (ref 98–107)
CO2 SERPL-SCNC: 27 MMOL/L (ref 22–31)
CREAT SERPL-MCNC: 1.48 MG/DL (ref 0.7–1.3)
GFR SERPL CREATININE-BSD FRML MDRD: 48 ML/MIN/1.73M2
GLUCOSE BLD-MCNC: 115 MG/DL (ref 70–125)
POTASSIUM BLD-SCNC: 4.7 MMOL/L (ref 3.5–5)
PSA SERPL-MCNC: <0.1 NG/ML (ref 0–4.5)
SODIUM SERPL-SCNC: 141 MMOL/L (ref 136–145)

## 2021-03-17 ENCOUNTER — RECORDS - HEALTHEAST (OUTPATIENT)
Dept: ADMINISTRATIVE | Facility: OTHER | Age: 61
End: 2021-03-17

## 2021-03-17 ENCOUNTER — COMMUNICATION - HEALTHEAST (OUTPATIENT)
Dept: INTERNAL MEDICINE | Facility: CLINIC | Age: 61
End: 2021-03-17

## 2021-03-17 LAB
SHBG SERPL-SCNC: 24 NMOL/L (ref 11–80)
TESTOST FREE SERPL-MCNC: 0.1 NG/DL (ref 4.7–24.4)
TESTOST SERPL-MCNC: 5 NG/DL (ref 240–950)

## 2021-03-19 ENCOUNTER — COMMUNICATION - HEALTHEAST (OUTPATIENT)
Dept: ONCOLOGY | Facility: HOSPITAL | Age: 61
End: 2021-03-19

## 2021-03-23 ENCOUNTER — OFFICE VISIT - HEALTHEAST (OUTPATIENT)
Dept: ONCOLOGY | Facility: HOSPITAL | Age: 61
End: 2021-03-23

## 2021-03-23 DIAGNOSIS — C77.5 PROSTATE CANCER METASTATIC TO INTRAPELVIC LYMPH NODE (H): ICD-10-CM

## 2021-03-23 DIAGNOSIS — C61 PROSTATE CANCER METASTATIC TO INTRAPELVIC LYMPH NODE (H): ICD-10-CM

## 2021-03-23 RX ORDER — DOXAZOSIN 1 MG/1
1 TABLET ORAL DAILY
Status: SHIPPED | COMMUNITY
Start: 2021-03-23 | End: 2021-11-03

## 2021-03-25 ENCOUNTER — AMBULATORY - HEALTHEAST (OUTPATIENT)
Dept: LAB | Facility: CLINIC | Age: 61
End: 2021-03-25

## 2021-03-25 ENCOUNTER — OFFICE VISIT - HEALTHEAST (OUTPATIENT)
Dept: INTERNAL MEDICINE | Facility: CLINIC | Age: 61
End: 2021-03-25

## 2021-03-25 DIAGNOSIS — D35.01 ADRENAL ADENOMA, RIGHT: ICD-10-CM

## 2021-03-25 DIAGNOSIS — E26.9 HYPERALDOSTERONISM (H): ICD-10-CM

## 2021-03-25 DIAGNOSIS — I15.9 SECONDARY HYPERTENSION: ICD-10-CM

## 2021-03-25 LAB
ANION GAP SERPL CALCULATED.3IONS-SCNC: 11 MMOL/L (ref 5–18)
BUN SERPL-MCNC: 40 MG/DL (ref 8–22)
CALCIUM SERPL-MCNC: 9.7 MG/DL (ref 8.5–10.5)
CHLORIDE BLD-SCNC: 103 MMOL/L (ref 98–107)
CO2 SERPL-SCNC: 26 MMOL/L (ref 22–31)
CREAT SERPL-MCNC: 1.54 MG/DL (ref 0.7–1.3)
GFR SERPL CREATININE-BSD FRML MDRD: 46 ML/MIN/1.73M2
GLUCOSE BLD-MCNC: 108 MG/DL (ref 70–125)
POTASSIUM BLD-SCNC: 5 MMOL/L (ref 3.5–5)
SODIUM SERPL-SCNC: 140 MMOL/L (ref 136–145)

## 2021-03-25 ASSESSMENT — MIFFLIN-ST. JEOR: SCORE: 1953.39

## 2021-03-26 ENCOUNTER — COMMUNICATION - HEALTHEAST (OUTPATIENT)
Dept: ONCOLOGY | Facility: HOSPITAL | Age: 61
End: 2021-03-26

## 2021-03-26 ENCOUNTER — AMBULATORY - HEALTHEAST (OUTPATIENT)
Dept: NURSING | Facility: CLINIC | Age: 61
End: 2021-03-26

## 2021-03-31 ENCOUNTER — COMMUNICATION - HEALTHEAST (OUTPATIENT)
Dept: ONCOLOGY | Facility: HOSPITAL | Age: 61
End: 2021-03-31

## 2021-04-01 ENCOUNTER — AMBULATORY - HEALTHEAST (OUTPATIENT)
Dept: LAB | Facility: HOSPITAL | Age: 61
End: 2021-04-01

## 2021-04-01 DIAGNOSIS — D35.01 ADRENAL ADENOMA, RIGHT: ICD-10-CM

## 2021-04-01 DIAGNOSIS — E26.9 HYPERALDOSTERONISM (H): ICD-10-CM

## 2021-04-01 LAB
ANION GAP SERPL CALCULATED.3IONS-SCNC: 7 MMOL/L (ref 5–18)
BUN SERPL-MCNC: 30 MG/DL (ref 8–22)
CALCIUM SERPL-MCNC: 9.7 MG/DL (ref 8.5–10.5)
CHLORIDE BLD-SCNC: 106 MMOL/L (ref 98–107)
CO2 SERPL-SCNC: 27 MMOL/L (ref 22–31)
CREAT SERPL-MCNC: 1.37 MG/DL (ref 0.7–1.3)
GFR SERPL CREATININE-BSD FRML MDRD: 52 ML/MIN/1.73M2
GLUCOSE BLD-MCNC: 109 MG/DL (ref 70–125)
POTASSIUM BLD-SCNC: 4.4 MMOL/L (ref 3.5–5)
SODIUM SERPL-SCNC: 140 MMOL/L (ref 136–145)

## 2021-04-07 ENCOUNTER — AMBULATORY - HEALTHEAST (OUTPATIENT)
Dept: PHARMACY | Facility: HOSPITAL | Age: 61
End: 2021-04-07

## 2021-04-08 ENCOUNTER — AMBULATORY - HEALTHEAST (OUTPATIENT)
Dept: LAB | Facility: HOSPITAL | Age: 61
End: 2021-04-08

## 2021-04-08 DIAGNOSIS — E26.9 HYPERALDOSTERONISM (H): ICD-10-CM

## 2021-04-08 DIAGNOSIS — D35.01 ADRENAL ADENOMA, RIGHT: ICD-10-CM

## 2021-04-08 LAB
ANION GAP SERPL CALCULATED.3IONS-SCNC: 6 MMOL/L (ref 5–18)
BUN SERPL-MCNC: 36 MG/DL (ref 8–22)
CALCIUM SERPL-MCNC: 9.7 MG/DL (ref 8.5–10.5)
CHLORIDE BLD-SCNC: 104 MMOL/L (ref 98–107)
CO2 SERPL-SCNC: 29 MMOL/L (ref 22–31)
CREAT SERPL-MCNC: 1.44 MG/DL (ref 0.7–1.3)
GFR SERPL CREATININE-BSD FRML MDRD: 50 ML/MIN/1.73M2
GLUCOSE BLD-MCNC: 104 MG/DL (ref 70–125)
POTASSIUM BLD-SCNC: 4.5 MMOL/L (ref 3.5–5)
SODIUM SERPL-SCNC: 139 MMOL/L (ref 136–145)

## 2021-04-15 ENCOUNTER — AMBULATORY - HEALTHEAST (OUTPATIENT)
Dept: LAB | Facility: HOSPITAL | Age: 61
End: 2021-04-15

## 2021-04-15 DIAGNOSIS — E26.9 HYPERALDOSTERONISM (H): ICD-10-CM

## 2021-04-15 DIAGNOSIS — D35.01 ADRENAL ADENOMA, RIGHT: ICD-10-CM

## 2021-04-15 LAB
ANION GAP SERPL CALCULATED.3IONS-SCNC: 7 MMOL/L (ref 5–18)
BUN SERPL-MCNC: 31 MG/DL (ref 8–22)
CALCIUM SERPL-MCNC: 9.3 MG/DL (ref 8.5–10.5)
CHLORIDE BLD-SCNC: 106 MMOL/L (ref 98–107)
CO2 SERPL-SCNC: 27 MMOL/L (ref 22–31)
CREAT SERPL-MCNC: 1.46 MG/DL (ref 0.7–1.3)
GFR SERPL CREATININE-BSD FRML MDRD: 49 ML/MIN/1.73M2
GLUCOSE BLD-MCNC: 129 MG/DL (ref 70–125)
POTASSIUM BLD-SCNC: 4.7 MMOL/L (ref 3.5–5)
SODIUM SERPL-SCNC: 140 MMOL/L (ref 136–145)

## 2021-04-16 ENCOUNTER — AMBULATORY - HEALTHEAST (OUTPATIENT)
Dept: NURSING | Facility: CLINIC | Age: 61
End: 2021-04-16

## 2021-04-18 LAB — ALDOST SERPL-MCNC: 4 NG/DL

## 2021-04-21 ENCOUNTER — COMMUNICATION - HEALTHEAST (OUTPATIENT)
Dept: INTERNAL MEDICINE | Facility: CLINIC | Age: 61
End: 2021-04-21

## 2021-05-26 VITALS
SYSTOLIC BLOOD PRESSURE: 193 MMHG | HEART RATE: 60 BPM | OXYGEN SATURATION: 96 % | TEMPERATURE: 98.1 F | DIASTOLIC BLOOD PRESSURE: 87 MMHG

## 2021-05-26 VITALS
SYSTOLIC BLOOD PRESSURE: 211 MMHG | TEMPERATURE: 97.9 F | OXYGEN SATURATION: 96 % | DIASTOLIC BLOOD PRESSURE: 110 MMHG | HEART RATE: 62 BPM

## 2021-05-27 NOTE — TELEPHONE ENCOUNTER
Refill Approved    Rx renewed per Medication Renewal Policy. Medication was last renewed on 9/10/18.    Lisa Meza, [facility] Triage/Med Refill 3/29/2019     Requested Prescriptions   Pending Prescriptions Disp Refills     simvastatin (ZOCOR) 40 MG tablet [Pharmacy Med Name: SIMVASTATIN 40MG TABLETS] 90 tablet 0     Sig: TAKE 1 TABLET(40 MG) BY MOUTH AT BEDTIME    Statins Refill Protocol (Hmg CoA Reductase Inhibitors) Passed - 3/28/2019 10:43 AM       Passed - PCP or prescribing provider visit in past 12 months     Last office visit with prescriber/PCP: 8/7/2017 Pratik Rico DO OR same dept: Visit date not found OR same specialty: 8/7/2017 Pratik Rico DO  Last physical: 9/10/2018 Last MTM visit: Visit date not found   Next visit within 3 mo: Visit date not found  Next physical within 3 mo: Visit date not found  Prescriber OR PCP: Pratik Rico DO  Last diagnosis associated with med order: 1. Pure hypercholesterolemia  - simvastatin (ZOCOR) 40 MG tablet [Pharmacy Med Name: SIMVASTATIN 40MG TABLETS]; TAKE 1 TABLET(40 MG) BY MOUTH AT BEDTIME  Dispense: 90 tablet; Refill: 0    2. Essential hypertension  - metoprolol succinate (TOPROL-XL) 50 MG 24 hr tablet [Pharmacy Med Name: METOPROLOL ER SUCCINATE 50MG TABS]; TAKE 1 TABLET(50 MG) BY MOUTH DAILY  Dispense: 90 tablet; Refill: 0    If protocol passes may refill for 12 months if within 3 months of last provider visit (or a total of 15 months).             metoprolol succinate (TOPROL-XL) 50 MG 24 hr tablet [Pharmacy Med Name: METOPROLOL ER SUCCINATE 50MG TABS] 90 tablet 0     Sig: TAKE 1 TABLET(50 MG) BY MOUTH DAILY    Beta-Blockers Refill Protocol Passed - 3/28/2019 10:43 AM       Passed - PCP or prescribing provider visit in past 12 months or next 3 months    Last office visit with prescriber/PCP: 8/7/2017 Pratik Rico DO OR same dept: Visit date not found OR same specialty: 8/7/2017 Pratik Rico  DO [place]  Last physical: 9/10/2018 Last MTM visit: Visit date not found   Next visit within 3 mo: Visit date not found  Next physical within 3 mo: Visit date not found  Prescriber OR PCP: Pratik Rico DO  Last diagnosis associated with med order: 1. Pure hypercholesterolemia  - simvastatin (ZOCOR) 40 MG tablet [Pharmacy Med Name: SIMVASTATIN 40MG TABLETS]; TAKE 1 TABLET(40 MG) BY MOUTH AT BEDTIME  Dispense: 90 tablet; Refill: 0    2. Essential hypertension  - metoprolol succinate (TOPROL-XL) 50 MG 24 hr tablet [Pharmacy Med Name: METOPROLOL ER SUCCINATE 50MG TABS]; TAKE 1 TABLET(50 MG) BY MOUTH DAILY  Dispense: 90 tablet; Refill: 0    If protocol passes may refill for 12 months if within 3 months of last provider visit (or a total of 15 months).            Passed - Blood pressure filed in past 12 months    BP Readings from Last 1 Encounters:   09/10/18 (!) 165/97

## 2021-05-29 NOTE — PROGRESS NOTES
Assessment/Plan:     Problem List Items Addressed This Visit     Coronary artery disease due to lipid rich plaque     Given his past history and recounting of current situation; DOMINIQUE completed to retrieve records. Will also get cardiology consult.    ER notes reviewed after patient had left. Discussed with patient. Denies CP, shortness of breath, CROCKER, palpitations. However, given the elevation of Trop I 30 days ago and asymptomatic, will change to rapid access to prevent delay in scheduling and further evaluation. (patient refused ER evaluation given lack of symptoms)         Relevant Medications    cloNIDine (CATAPRES-TTS) 0.2 mg/24 hr    hydroCHLOROthiazide (HYDRODIURIL) 25 MG tablet    lisinopril (PRINIVIL,ZESTRIL) 40 MG tablet    metoprolol succinate (TOPROL-XL) 50 MG 24 hr tablet    simvastatin (ZOCOR) 40 MG tablet    Other Relevant Orders    Ambulatory referral to [facility]    Hypertension - Primary     Poorly controlled. Increase metoprolol 50 mg to twice a day. Nephro report reviewed. Continue clonidine, lisinopril and HCTZ at current dosing. Significant leg edema on norvasc previously as well as low potassium levels and cramping when HCTZ elevated to 25 mg.     Also worried about what he stated from the work-up at the emergency room that he ended up leaving in [place].  DOMINIQUE was completed and the records are being faxed over today.  If it was truly elevated, given his history of coronary artery disease and stenting, will get into rapid access at cardiology.  In the meantime placed in a normal consult cardiology to help out with blood pressure.         Relevant Medications    cloNIDine (CATAPRES-TTS) 0.2 mg/24 hr    hydroCHLOROthiazide (HYDRODIURIL) 25 MG tablet    lisinopril (PRINIVIL,ZESTRIL) 40 MG tablet    metoprolol succinate (TOPROL-XL) 50 MG 24 hr tablet    Other Relevant Orders    Basic Metabolic Panel    HM2(CBC w/o Differential) (Completed)    Ambulatory referral to [facility]    Obesity (BMI 35.0-39.9) with comorbidity (H)     Patient has lost 20 pounds in the past 6 months and feeling better energy.  Continue current efforts.         Benign prostatic hyperplasia with urinary frequency     Not controlled with over-the-counter supplementation.  As such we will add in Flomax.  Side effects precautions discussed.  Expect improvement within 1 month, if not noticing any improvement after a month, contact clinic and will send to urology for further evaluation.         Relevant Medications    tamsulosin (FLOMAX) 0.4 mg cap      Other Visit Diagnoses     Prostate cancer screening        Relevant Orders    PSA (Prostatic-Specific Antigen), Annual Screen        Return in about 2 weeks (around 6/17/2019) for Hypertension.    Subjective:   59 y.o. male presents for follow-up from ER visit 30 days ago.  Patient has been taking a cruise with his boat.  He started down to [place] and doing golfing [place]  He was having flulike symptoms back on 29 April and stopped at a port in [place].  He went to the ER for evaluation and was found to be positive for influenza.  However his blood pressure was also significantly elevated.  He denies missing any doses of his medication and being constant on taking them.  Also of note he has a history of coronary stenting in 2 arteries in 1998.  Because of his elevated blood pressure, they also checked a troponin I which was elevated at 0.1 the notes of which they sent did not have been normal levels however it was labeled as critical.  This was brought to the attention the patient and it was recommended that he be admitted and watched overnight.  Patient declined and left AGAINST MEDICAL ADVICE.  This is the first time he has followed up.  He is following up solely because his blood pressure was elevated.  He does not have the notes from the ER visit and he did complete an DOMINIQUE.  At the notes were eventually  "received but it was after the patient was discharged from clinic.  In clinic today he denies any chest pain, shortness of breath, dyspnea on exertion, palpitations, nausea or vomiting.  Denies any swelling of the extremities.  He states his flu symptoms cleared up within 2 days of being at the ER.  They did put him on Tamiflu.  Even today he happened to mention in the ER visit casually to the rooming staff, and even then did not discuss the troponin I until it was mentioned that his blood pressure was elevated today.  In that conversation he stated that he was in the ER and it was elevated there and they also said that he had some elevated enzymes and wanted him to stay for cardiac work-up but he decided to get back on the boat and continue his trip.      I have had an Advance Directives discussion with the patient.      Review of Systems   Constitutional: Negative for activity change, appetite change, chills, diaphoresis, fatigue and fever.   Eyes: Negative for visual disturbance.   Respiratory: Negative for cough, choking, chest tightness, shortness of breath, wheezing and stridor.    Cardiovascular: Negative for chest pain, palpitations and leg swelling.   Gastrointestinal: Negative for abdominal pain, constipation, diarrhea, nausea and vomiting.   Genitourinary: Negative for difficulty urinating.   Neurological: Negative for dizziness, speech difficulty, light-headedness, numbness and headaches.        History     Reviewed By Date/Time Sections Reviewed    Pratik Rico,  6/3/2019 10:47 AM Medical, Surgical, Tobacco, Alcohol, Drug Use, Sexual Activity, Family    Pratik Rico,  6/3/2019 10:28 AM Tobacco    Pepito Vazquez Jr., [facility] 6/3/2019 10:05 AM Tobacco           Objective:     Vitals:    06/03/19 1002 06/03/19 1028   BP: (!) 172/110 (!) 164/100   Pulse: 64    Resp: 12    Temp: 98.2  F (36.8  C)    TempSrc: Oral    Weight: (!) 243 lb 11.2 oz (110.5 kg)    Height: 5' 8.5\" (1.74 m)  "     Physical Exam   Constitutional: He is oriented to person, place, and time. He appears well-developed and well-nourished.   HENT:   Head: Normocephalic and atraumatic.   Right Ear: External ear normal.   Left Ear: External ear normal.   Nose: Nose normal.   Mouth/Throat: Oropharynx is clear and moist.   Eyes: Pupils are equal, round, and reactive to light. Conjunctivae and EOM are normal.   Neck: Normal range of motion. No thyromegaly present.   Cardiovascular: Normal rate, regular rhythm, normal heart sounds and intact distal pulses. Exam reveals no gallop and no friction rub.   No murmur heard.  Pulmonary/Chest: Effort normal and breath sounds normal. No stridor. No respiratory distress. He has no wheezes.   Abdominal: Soft. Bowel sounds are normal. He exhibits no mass. There is no tenderness. There is no guarding.   Musculoskeletal: He exhibits no edema.   Neurological: He is alert and oriented to person, place, and time.   Skin: Skin is warm and dry. Capillary refill takes less than 2 seconds.   Psychiatric: He has a normal mood and affect.       This note has been dictated using voice recognition software. Any grammatical or context distortions are unintentional and inherent to the software

## 2021-05-29 NOTE — PROGRESS NOTES
Consultation - [facility]  Manas Martinez,  1960, MRN [id number]    PCP: Pratik Rico, DO, [phone]    Assessment and Plan: troponin elevation htn  Recommendations: NXT    Chief Complaint:  sob    HPI:  We have been requested by wei to evaluate Manas Martinez for consultation who is a  59 y.o. year old male for above chief complaint.  Hx: 59 m   Fever chills 2 days shortness of breath, went to [place] er and told troponin enzymes, left er and went on cruise.  No cp.  Since then sx improved.  Never any cp.   Mi 22 y ago rca stent sx arm chest pan intense severe.  No sx since.    htn hld nonsmoker, no diabetes  ecg nsr rbbb   echo stress normal          Current Outpatient Medications:      aspirin 81 MG EC tablet, Take 81 mg by mouth daily. Delayed release., Disp: , Rfl:      cloNIDine (CATAPRES-TTS) 0.2 mg/24 hr, PLACE 1 PATCH ON THE SKIN ONCE A WEEK, Disp: 4 patch, Rfl: 0     fluticasone (FLONASE) 50 mcg/actuation nasal spray, 2 sprays into each nostril daily., Disp: 48 g, Rfl: 3     hydroCHLOROthiazide (HYDRODIURIL) 25 MG tablet, Take 0.5 tablets (12.5 mg total) by mouth daily., Disp: 45 tablet, Rfl: 2     lisinopril (PRINIVIL,ZESTRIL) 40 MG tablet, Take 1 tablet (40 mg total) by mouth daily., Disp: 90 tablet, Rfl: 2     metoprolol succinate (TOPROL-XL) 50 MG 24 hr tablet, Take 1 tablet (50 mg total) by mouth 2 (two) times a day., Disp: 180 tablet, Rfl: 1     simvastatin (ZOCOR) 40 MG tablet, TAKE 1 TABLET(40 MG) BY MOUTH AT BEDTIME, Disp: 90 tablet, Rfl: 1     tamsulosin (FLOMAX) 0.4 mg cap, Take 1 capsule (0.4 mg total) by mouth daily., Disp: 90 capsule, Rfl: 3  Medical History  Active Ambulatory (Non-Hospital) Problems    Diagnosis     Benign prostatic hyperplasia with urinary frequency     Obesity (BMI 35.0-39.9) with comorbidity (H)     Adrenal adenoma, right     KIERSTEN (obstructive sleep apnea)     Other asplenic status     Allergic Rhinitis     Deviated Nasal Septum  (Acquired)     Diffuse Joint Pains (Arthralgias)     Coronary artery disease due to lipid rich plaque     Hypertension     Prediabetes     Past Medical History:   Diagnosis Date     Allergic rhinitis      Coronary artery disease      Deviated septum      Hyperlipemia      Hypertension      Joint pain      Myocardial infarction (H) 1999     Obesity      Other asplenic status      Prediabetes        Surgical History  He  has a past surgical history that includes pr removal spleen, total; Coronary stent placement (1999); Cardiac catheterization (1999); and [place] tooth extraction.    Social History  Reviewed, and he  reports that he has quit smoking. His smoking use included cigarettes. He has a 25.00 pack-year smoking history. He has never used smokeless tobacco. He reports that he drinks about 6.0 oz of alcohol per week. He reports that he does not use drugs.  Smoking status reviewed.  Social history othrwise not contributory to HPI.  Allergies  Allergies   Allergen Reactions     Atorvastatin Other (See Comments)     Leg Cramps       Family History  Reviewed, and family history includes Diabetes in his father, mother, paternal grandfather, and paternal grandmother; Hypertension in his brother, brother, brother, father, and mother; Prostate cancer in his brother.  Extended Emergency Contact Information  Primary Emergency Contact: Cary Martinez  Address: [id number] [address] [facility]  Home Phone: [phone]  Relation: Spouse  Family history otherwise negative or not conributory to HPI.    Psychosocial Needs  Social History     Social History Narrative     Not on file     Additional psychosocial needs reviewed per nursing assessment.    Prior to Admission Medications    (Not in a hospital admission)    Review of Systems:  A 12 point comprehensive review of systems was negative except as noted.  Review of systems is negative except for HPI  Physical Exam:  Less than 10 mL of  urine  Vitals:    06/10/19 1018   BP: (!) 194/100   Pulse: 60     Head and neck without focal cranial neurologic defects.  JVD not distended.  Carotid upstroke normal without bruit.  External eye exam normal without icterus.  External ear exam normal.  Neck without cervical lymphadenopathy or thyromegaly.  BP (!) 194/100 (Patient Site: Left Arm, Patient Position: Sitting, Cuff Size: Adult Large)   Pulse 60   General appearance: alert, appears stated age and cooperative  Lungs: clear to auscultation bilaterally  Heart: regular rate and rhythm, S1, S2 normal, no murmur, click, rub or gallop   Abdomen with normal bowel tones.  Skin without rash, ecchymosis, lesions.  Neuromuscular tone normal.  Peripheral pulse intact and equal.  Joints without swelling or erythema.      [unfilled]    Pertinent Labs  Lab Results: personally reviewed.   Lab Results   Component Value Date     06/03/2019    K 3.4 (L) 06/03/2019     06/03/2019    CO2 29 06/03/2019    BUN 16 06/03/2019    CREATININE 0.87 06/03/2019    CALCIUM 9.6 06/03/2019     No results found for: CKTOTAL, CKMB, CKMBINDEX, TROPONINI  Lab Results   Component Value Date    WBC 7.7 06/03/2019    WBC 7.2 06/24/2014    HGB 14.5 06/03/2019    HCT 43.8 06/03/2019    MCV 91 06/03/2019     06/03/2019     Lab Results   Component Value Date    CHOL 177 06/03/2019    TRIG 89 06/03/2019    HDL 36 (L) 06/03/2019       Pertinent Radiology  Radiology Results: See Report  EKG Results: personally reviewed.  and See Report       Current Outpatient Medications:      aspirin 81 MG EC tablet, Take 81 mg by mouth daily. Delayed release., Disp: , Rfl:      cloNIDine (CATAPRES-TTS) 0.2 mg/24 hr, PLACE 1 PATCH ON THE SKIN ONCE A WEEK, Disp: 4 patch, Rfl: 0     fluticasone (FLONASE) 50 mcg/actuation nasal spray, 2 sprays into each nostril daily., Disp: 48 g, Rfl: 3     hydroCHLOROthiazide (HYDRODIURIL) 25 MG tablet, Take 0.5 tablets (12.5 mg total) by mouth daily., Disp: 45  tablet, Rfl: 2     lisinopril (PRINIVIL,ZESTRIL) 40 MG tablet, Take 1 tablet (40 mg total) by mouth daily., Disp: 90 tablet, Rfl: 2     metoprolol succinate (TOPROL-XL) 50 MG 24 hr tablet, Take 1 tablet (50 mg total) by mouth 2 (two) times a day., Disp: 180 tablet, Rfl: 1     simvastatin (ZOCOR) 40 MG tablet, TAKE 1 TABLET(40 MG) BY MOUTH AT BEDTIME, Disp: 90 tablet, Rfl: 1     tamsulosin (FLOMAX) 0.4 mg cap, Take 1 capsule (0.4 mg total) by mouth daily., Disp: 90 capsule, Rfl: 3

## 2021-05-30 NOTE — TELEPHONE ENCOUNTER
----- Message from Waldo Kerr MD sent at 6/25/2019  8:03 AM CDT -----  Regarding: FW: Manjeet PERALTA patient  Can you call to schedule?  Thanks!   ----- Message -----  From: Kristi Barroso RN  Sent: 6/24/2019   3:58 PM  To: Waldo Kerr MD  Subject: Manjeet PERALTA patient                               This patient is interested in establishing care for HTN management with Dr. Kerr.  Please contact him with an appointment  Thank you!    [id number]

## 2021-05-30 NOTE — TELEPHONE ENCOUNTER
I called and notified pt that Dr. Kerr would like him to make an appt to see him and pt said that he will call back at a later time to schedule an appt.

## 2021-05-31 VITALS — BODY MASS INDEX: 39.09 KG/M2 | WEIGHT: 257.9 LBS | HEIGHT: 68 IN

## 2021-05-31 VITALS — BODY MASS INDEX: 40.04 KG/M2 | HEIGHT: 68 IN | WEIGHT: 264.2 LBS

## 2021-06-01 ENCOUNTER — COMMUNICATION - HEALTHEAST (OUTPATIENT)
Dept: ONCOLOGY | Facility: HOSPITAL | Age: 61
End: 2021-06-01

## 2021-06-01 VITALS — WEIGHT: 256 LBS | BODY MASS INDEX: 37.92 KG/M2 | HEIGHT: 69 IN

## 2021-06-01 NOTE — TELEPHONE ENCOUNTER
Called and spoke with Valarie-advised his blood draw was on 09/18- unlikely that we can add on to that at this point.     PAtient is coming in Friday- will make note in appt notes to make sure he gets these done

## 2021-06-01 NOTE — PROGRESS NOTES
Office Visit - New Patient   Manas Martinez   59 y.o.  male    Date of visit: 9/18/2019  Physician: Waldo Kerr MD     Assessment and Plan   1. Hypertension  Poorly controlled and concerning for secondary hypertension.  He apparently has had a renin and aldosterone level checked before but I could not find documentation and we are going to check labs today so I have added these.  I do not see that he is ever had screening for pheochromocytoma or elevated cortisol levels we will do urine studies for this.  Additionally, he has had hypokalemia and non-aldosterone mediated mineralocorticoid excess could be playing a role.  I think obstructive sleep apnea obesity is certainly playing a role and I have encouraged him to follow-up with his sleep doctor regarding treatment for obstructive sleep apnea.  We also discussed importance of sodium restriction regular exercise and healthy diet and weight loss.  We will make modifications to his blood pressure regimen with medications as below.  He will stop metoprolol and hydrochlorothiazide.  If he has non-aldosterone mediated mineralocorticoid excess, triamterene could also be tried.  Not mentioned below he has had a 24-hour blood pressure monitor and he is a non-dipper.  I would like to see him back in 1 week for follow-up.  He does have a nephrology appointment with the Deepak C[facility]rena later this month  - carvedilol (COREG) 12.5 MG tablet; Take 1 tablet (12.5 mg total) by mouth 2 (two) times a day with meals.  Dispense: 180 tablet; Refill: 3  - chlorthalidone (HYGROTEN) 25 MG tablet; Take 0.5 tablets (12.5 mg total) by mouth daily.  Dispense: 45 tablet; Refill: 3  - spironolactone (ALDACTONE) 25 MG tablet; Take 0.5 tablets (12.5 mg total) by mouth daily.  Dispense: 45 tablet; Refill: 3    2. Adrenal adenoma, right  As above  - Catecholamine Fractionation, Free, 24 Hour Urine; Future  - Metanephrines Fractionated by HPLC-MS/MS, Urine; Future  - Cortisol Urine Free by  LC-MS/MS; Future  - Aldosterone, Serum  - Renin Activity    3. KIERSTEN (obstructive sleep apnea)  As above    4. Coronary artery disease due to lipid rich plaque  Continue secondary prevention    5. Benign prostatic hyperplasia with urinary frequency  He is on Flomax, slight increase in PSA, established with urology  - Ambulatory referral to Urology    6. Left kidney mass  - US Kidney Left; Future    7. Need for prophylactic vaccination and inoculation against influenza  - Influenza, Recombinant, Inj, Quadrivalent, PF, 18+YRS    8. Asplenia after surgical procedure  Per review of his chart it does not appear that he has had adequate vaccination this will be addressed in 1 week at follow-up    9. Screening for diabetes mellitus  - Glycosylated Hemoglobin A1c    10. Obesity (BMI 35.0-39.9) with comorbidity (H)  The following high BMI interventions were performed this visit: encouragement to exercise and lifestyle education regarding diet    Return in about 1 week (around 2019).     Chief Complaint   Chief Complaint   Patient presents with     Establish Care     Hypertension     Prostate Cancer        Patient Profile   Social History     Social History Narrative    Lives with his wife, Cary.  Retired , [employer].  No biological children.  Three step children.  Four step child  from leukemia.        Past Medical History   Patient Active Problem List   Diagnosis     Allergic Rhinitis     Deviated Nasal Septum (Acquired)     Diffuse Joint Pains (Arthralgias)     Coronary artery disease due to lipid rich plaque     Hypertension     Prediabetes     Asplenia after surgical procedure     KIERSTEN not on CPAP     Adrenal adenoma, right     Obesity (BMI 35.0-39.9) with comorbidity (H)     Benign prostatic hyperplasia with urinary frequency     RBBB       Past Surgical History  He has a past surgical history that includes pr removal spleen, total (); Coronary stent placement (); Cardiac catheterization ();  Smoot tooth extraction; Finger tendon repair (Left); and Biceps tendon repair (Left).     History of Present Illness   This 59 y.o. old man comes in to establish care.  His medical history is reviewed, electronic medical records update is reflectance note.  He has quite significant hypertension.  He has been monitoring this in his systolic readings have been consistently over 200 and his diastolic readings usually around 110.  He spent the last many months on a river boat traveling from [place] to [place] and [facility].  He is therefore not sought medical treatment.  He has been following with nephrology.  He has a known right adrenal mass which has been stable.  Per report renin and aldosterone were normal but I cannot find this documentation in recent records.  It does not appear he is ever had urine studies for this.  He has generally been able to control his blood pressure through diet and exercise but recently this become more difficult.  He does have obstructive sleep apnea and does not use CPAP.  His history of coronary artery disease and stent placement to the RCA.  He quit smoking 1999 at the time of his myocardial infarction.  He has had renal ultrasound which does not show any renal artery stenosis.  He did have a ill-defined mass in his left kidney that needed follow-up.  He denies symptoms of anxiety tachycardia.  He will get hot and sweaty at times.  He has been taking metoprolol, lisinopril and hydrochlorothiazide.  He has significant urinary frequency and has had slight increase in his PSA.    Review of Systems: A comprehensive review of systems was negative except as noted.     Medications and Allergies   Current Outpatient Medications   Medication Sig Dispense Refill     aspirin 81 MG EC tablet Take 81 mg by mouth daily. Delayed release.       cloNIDine (CATAPRES-TTS) 0.2 mg/24 hr PLACE 1 PATCH ON THE SKIN ONCE A WEEK 4 patch 0     fluticasone (FLONASE) 50 mcg/actuation nasal  "spray 2 sprays into each nostril daily. 48 g 3     lisinopril (PRINIVIL,ZESTRIL) 40 MG tablet Take 1 tablet (40 mg total) by mouth daily. 90 tablet 2     simvastatin (ZOCOR) 40 MG tablet TAKE 1 TABLET(40 MG) BY MOUTH AT BEDTIME 90 tablet 1     tamsulosin (FLOMAX) 0.4 mg cap Take 1 capsule (0.4 mg total) by mouth daily. 90 capsule 3     carvedilol (COREG) 12.5 MG tablet Take 1 tablet (12.5 mg total) by mouth 2 (two) times a day with meals. 180 tablet 3     chlorthalidone (HYGROTEN) 25 MG tablet Take 0.5 tablets (12.5 mg total) by mouth daily. 45 tablet 3     spironolactone (ALDACTONE) 25 MG tablet Take 0.5 tablets (12.5 mg total) by mouth daily. 45 tablet 3     No current facility-administered medications for this visit.      Allergies   Allergen Reactions     Atorvastatin Other (See Comments)     Leg Cramps        Family and Social History   Family History   Problem Relation Age of Onset     Hypertension Mother      Diabetes Mother      COPD Mother      Hypertension Father      Diabetes Father      Prostate cancer Father      Pancreatic cancer Father      Hypertension Brother      Prostate cancer Brother      Diabetes Paternal Grandmother      Diabetes Paternal Grandfather      Hypertension Brother      Hypertension Brother         Social History     Tobacco Use     Smoking status: Former Smoker     Packs/day: 1.00     Years: 25.00     Pack years: 25.00     Types: Cigarettes     Last attempt to quit:      Years since quittin.7     Smokeless tobacco: Never Used   Substance Use Topics     Alcohol use: Yes     Alcohol/week: 6.0 oz     Types: 10 Cans of beer per week     Drug use: No        Physical Exam   General Appearance:   No acute distress    BP (!) 200/136 (Patient Site: Right Arm, Patient Position: Sitting, Cuff Size: Adult Regular)   Pulse 65   Ht 5' 8.5\" (1.74 m)   Wt (!) 246 lb (111.6 kg)   SpO2 98%   BMI 36.86 kg/m      EYES: Eyelids, conjunctiva, and sclera were normal. Pupils were normal. " Cornea, iris, and lens were normal bilaterally.  HEAD, EARS, NOSE, MOUTH, AND THROAT: Head and face were normal. Hearing was normal to voice and the ears were normal to external exam. Nose appearance was normal and there was no discharge. Oropharynx was normal.  NECK: Neck appearance was normal. There were no neck masses and the thyroid was not enlarged.  RESPIRATORY: Breathing pattern was normal and the chest moved symmetrically.  Percussion/auscultatory percussion was normal.  Lung sounds were normal and there were no abnormal sounds.  CARDIOVASCULAR: Heart rate and rhythm were normal.  S1 and S2 were normal and there were no extra sounds or murmurs. Peripheral pulses in arms and legs were normal.  Jugular venous pressure was normal.  There was no peripheral edema.  GASTROINTESTINAL: The abdomen was normal in contour.  Bowel sounds were present.  Percussion detected no organ enlargement or tenderness.  Palpation detected no tenderness, mass, or enlarged organs.   MUSCULOSKELETAL: Skeletal configuration was normal and muscle mass was normal for age. Joint appearance was overall normal.  LYMPHATIC: There were no enlarged nodes.  SKIN/HAIR/NAILS: Skin color was normal.  There were no skin lesions.  Hair and nails were normal.  NEUROLOGIC: The patient was alert and oriented to person, place, time, and circumstance. Speech was normal. Cranial nerves were normal. Motor strength was normal for age. The patient was normally coordinated.  PSYCHIATRIC:  Mood and affect were normal and the patient had normal recent and remote memory. The patient's judgment and insight were normal.       Additional Information   Review and/or order of clinical lab tests:  Review and/or order of radiology tests:  Review and/or order of medicine tests:  Discussion of test results with performing physician:  Decision to obtain old records and/or obtain history from someone other than the patient:  Review and summarization of old records and/or  obtaining history from someone other than the patient and.or discussion of case with another health care provider:  Independent visualization of image, tracing or specimen itself:    Time: total time spent with the patient was 60 minutes of which >50% was spent in counseling and coordination of care as outlined extensively in the note above     Waldo Kerr MD  Internal Medicine  Contact me at [phone]

## 2021-06-01 NOTE — TELEPHONE ENCOUNTER
Who is calling:  [facility]  Reason for Call:  We seen on Epic only partial lab orders were entered and we need to add on if able. Please return call ASAP to discuss if these labs can be added to the blood draw or if patient needs to return for a redraw.  Date of last appointment with primary care: 9/18/19  Okay to leave a detailed message: Yes

## 2021-06-01 NOTE — PROGRESS NOTES
Office Visit - Follow Up   Manas Martinez   59 y.o. male    Date of Visit: 9/27/2019    Chief Complaint   Patient presents with     Blood Pressure Check        Assessment and Plan   1. Hypertension  Blood pressure is improved, increase chlorthalidone to 25 mg daily, increase spironolactone to 25 mg daily.  He has an appoint with Dr. Deepak Flores on Monday.  Carvedilol could be increased at that time pending his assessment.  Ultimately it might be nice for him to stop clonidine.  I have asked him to show the results of the renin and aldosterone levels to Caden Barlow.  - chlorthalidone (HYGROTEN) 25 MG tablet; Take 1 tablet (25 mg total) by mouth daily.  Dispense: 45 tablet; Refill: 3  - spironolactone (ALDACTONE) 25 MG tablet; Take 1 tablet (25 mg total) by mouth daily.  Dispense: 45 tablet; Refill: 3  - Basic Metabolic Panel    2. Adrenal adenoma, right  As above    3. KIERSTEN not on CPAP  Encourage use of CPAP    4. Asplenia after surgical procedure  Needs vaccinations    5. Obesity (BMI 35.0-39.9) with comorbidity (H)  The following high BMI interventions were performed this visit: encouragement to exercise and lifestyle education regarding diet    Return in about 4 weeks (around 10/25/2019) for recheck.     History of Present Illness   This 59 y.o. old man comes in for follow-up.  Last visit he was exceptionally hypertensive despite being on metoprolol, lisinopril, hydrochlorothiazide, tamsulosin and clonidine.  He has a known adrenal nodule which is felt been benign.  I could not find any biochemical evaluation and so I repeated a renin and aldosterone level.  His Reading level is suppressed and his aldosterone level is 19.1, at minimum concerning for hyperaldosteronism.  24-hour urine metanephrines, CAD and will: Means and cortisol are pending.  I switched him from metoprolol to carvedilol from hydrochlorothiazide to chlorthalidone and I added spironolactone.  His blood pressure is much better now 150/100 on  "the was 200/136 last visit.  He is tolerating medication changes without lightheadedness dizziness chest pain or shortness of breath.  He does have frequent urination.  We reviewed imaging studies as well, renal cyst noted, benign    Review of Systems: A comprehensive review of systems was negative except as noted.     Medications, Allergies and Problem List   Reviewed, reconciled and updated  Post Discharge Medication Reconciliation Status:      Physical Exam   General Appearance:   No acute distress    BP (!) 154/102 (Patient Site: Right Arm, Patient Position: Sitting, Cuff Size: Adult Regular)   Pulse 63   Ht 5' 8.5\" (1.74 m)   Wt (!) 250 lb (113.4 kg)   SpO2 97%   BMI 37.46 kg/m      HEENT exam is unremarkable  Neck supple no thyromegaly or nodule palpable  Lymphatic no cervical lymphadenopathy  Cardiovascular regular rate and rhythm no murmur gallop or rub  Pulmonary lungs are clear to auscultation bilaterally  Gastrointestinal abdomen soft nontender nondistended no organomegaly  Neurologic exam is non focal  Psychiatric pleasant, no confusion or agitation        Additional Information   Current Outpatient Medications   Medication Sig Dispense Refill     aspirin 81 MG EC tablet Take 81 mg by mouth daily. Delayed release.       carvedilol (COREG) 12.5 MG tablet Take 1 tablet (12.5 mg total) by mouth 2 (two) times a day with meals. 180 tablet 3     chlorthalidone (HYGROTEN) 25 MG tablet Take 1 tablet (25 mg total) by mouth daily. 45 tablet 3     cloNIDine (CATAPRES-TTS) 0.3 mg/24 hr Place 1 patch on the skin every 7 days.       fluticasone (FLONASE) 50 mcg/actuation nasal spray 2 sprays into each nostril daily. 48 g 3     lisinopril (PRINIVIL,ZESTRIL) 40 MG tablet Take 1 tablet (40 mg total) by mouth daily. 90 tablet 2     simvastatin (ZOCOR) 40 MG tablet TAKE 1 TABLET(40 MG) BY MOUTH AT BEDTIME 90 tablet 1     spironolactone (ALDACTONE) 25 MG tablet Take 1 tablet (25 mg total) by mouth daily. 45 tablet 3 "     tamsulosin (FLOMAX) 0.4 mg cap Take 1 capsule (0.4 mg total) by mouth daily. 90 capsule 3     No current facility-administered medications for this visit.      Allergies   Allergen Reactions     Atorvastatin Other (See Comments)     Leg Cramps     Social History     Tobacco Use     Smoking status: Former Smoker     Packs/day: 1.00     Years: 25.00     Pack years: 25.00     Types: Cigarettes     Last attempt to quit:      Years since quittin.7     Smokeless tobacco: Never Used   Substance Use Topics     Alcohol use: Yes     Alcohol/week: 10.0 standard drinks     Types: 10 Cans of beer per week     Drug use: No       Review and/or order of clinical lab tests:  Review and/or order of radiology tests:  Review and/or order of medicine tests:  Discussion of test results with performing physician:  Decision to obtain old records and/or obtain history from someone other than the patient:  Review and summarization of old records and/or obtaining history from someone other than the patient and.or discussion of case with another health care provider:  Independent visualization of image, tracing or specimen itself:    Time:      Waldo Kerr MD

## 2021-06-02 VITALS — BODY MASS INDEX: 36.09 KG/M2 | WEIGHT: 243.7 LBS | HEIGHT: 69 IN

## 2021-06-02 NOTE — TELEPHONE ENCOUNTER
I spoke with Dr. Deepak Flores and Dr. Trace Adams.  We will repeat a CT scan and have him see Dr. Trace Adams for evaluation of his adenoma which is probably producing aldosterone

## 2021-06-03 VITALS — WEIGHT: 250 LBS | HEIGHT: 69 IN | BODY MASS INDEX: 37.03 KG/M2

## 2021-06-03 VITALS
DIASTOLIC BLOOD PRESSURE: 100 MMHG | WEIGHT: 255 LBS | BODY MASS INDEX: 37.77 KG/M2 | SYSTOLIC BLOOD PRESSURE: 144 MMHG | HEIGHT: 69 IN | HEART RATE: 56 BPM | OXYGEN SATURATION: 98 %

## 2021-06-03 VITALS
WEIGHT: 250 LBS | DIASTOLIC BLOOD PRESSURE: 102 MMHG | HEIGHT: 69 IN | SYSTOLIC BLOOD PRESSURE: 154 MMHG | HEART RATE: 63 BPM | OXYGEN SATURATION: 97 % | BODY MASS INDEX: 37.03 KG/M2

## 2021-06-03 VITALS
OXYGEN SATURATION: 98 % | SYSTOLIC BLOOD PRESSURE: 200 MMHG | BODY MASS INDEX: 36.43 KG/M2 | DIASTOLIC BLOOD PRESSURE: 136 MMHG | HEIGHT: 69 IN | WEIGHT: 246 LBS | HEART RATE: 65 BPM

## 2021-06-03 VITALS
WEIGHT: 256 LBS | TEMPERATURE: 97.7 F | DIASTOLIC BLOOD PRESSURE: 103 MMHG | OXYGEN SATURATION: 97 % | HEART RATE: 56 BPM | SYSTOLIC BLOOD PRESSURE: 206 MMHG | BODY MASS INDEX: 38.36 KG/M2

## 2021-06-03 VITALS — BODY MASS INDEX: 36.73 KG/M2 | HEIGHT: 69 IN | WEIGHT: 248 LBS

## 2021-06-03 NOTE — TELEPHONE ENCOUNTER
New Appointment Needed  What is the reason for the visit:    Pre-Op Appt Request  When is the surgery? :  11/26  Where is the surgery?:   [facility]   Who is the surgeon? :  Zach Gaitan MD  What type of surgery is being done?: CT Biopsy   Provider Preference: PCP only  How soon do you need to be seen?: This week   Waitlist offered?: No  Okay to leave a detailed message:  Yes

## 2021-06-03 NOTE — PROGRESS NOTES
Due to the patient's significantly elevated blood pressure, the scheduled biopsy was not performed. This was discussed with the patient and his wife and they expressed understanding. Upon cancellation, recommendation to go to the Emergency Room or immediately to his primary provider discussed.

## 2021-06-03 NOTE — H&P (VIEW-ONLY)
Preoperative Consultation   Manas Martinez   59 y.o.  male    Date of visit: 2019  Physician: Waldo Kerr MD    This is a preoperative consultation requested by interventional radiology in preparation for CT guided biopsy on 19 at [facility], fax [phone].       Assessment and Plan   Manas Martinez was seen in preoperative consultation in preparation for CT guided biopsy.  This is a low risk surgery and the patient has increased risk for major cardiac complications based on a history of myocardial infarction.  Please note he has quite elevated blood pressure requiring numerous medications to control this.  He probably has an aldosterone producing adrenal adenoma.  I have asked him to continue on his medications through the surgery.  He will be arriving at the surgery center prior to when he would normally take his medications and therefore he will hold these but bring them with him.  He will maintain his clonidine patch to the surgery.  Please note he does have obstructive sleep apnea.  He will stop aspirin and he Jamil has.    1. Preoperative examination    2. Prostate cancer (H)    3. Adrenal adenoma, right    4. 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 Coronary artery disease due to lipid rich plaque    6. Hypertension    7. KIERSTEN not on CPAP    8. Obesity (BMI 35.0-39.9) with comorbidity (H)         Patient Profile   Social History     Social History Narrative    Lives with his wife, Cary.  Retired , [employer].  No biological children.  Three step children.  Four step child  from leukemia.        Past Medical History   Patient Active Problem List   Diagnosis     Allergic Rhinitis     Deviated Nasal Septum (Acquired)     Diffuse Joint Pains (Arthralgias)     Coronary artery disease due to lipid rich plaque     Hypertension     Prediabetes     Asplenia after surgical procedure     KIERSTEN not on CPAP     Adrenal adenoma, right     Obesity (BMI 35.0-39.9)  with comorbidity (H)     Benign prostatic hyperplasia with urinary frequency     RBBB       Past Surgical History  He has a past surgical history that includes pr removal spleen, total (1972); Coronary stent placement (1999); Cardiac catheterization (1999); [place] tooth extraction; Finger tendon repair (Left); and Biceps tendon repair (Left).     History of Present Illness   This 59 y.o. old man comes in for preop evaluation.  He was found to have a cancer on biopsy.  He had a CT scan which shows retroperitoneal lymphadenopathy.. He had a bone scan which showed no evidence of other metastases.  He has underlying hypertension which is been challenging to control.  Evaluation of the adrenal nodule has shown elevated aldosterone level concerning for aldosterone secreting adenoma.  He has met with Dr. Trace Adams and surgery has been considered.  He does have obstructive sleep apnea.  He has a history of myocardial infarction 22 years ago.  Recent stress testing looked okay.    Recent antiplatelet use: yes He has stopped aspirin  Personal or family history of bleeding or clotting disorders: no  Steroid use in the past year: no  Personal or family history of difficulty with anesthesia: no  Current cardiopulmonary symptoms: no    Review of Systems: A comprehensive review of systems was negative except as noted.     Medications and Allergies   Current Outpatient Medications   Medication Sig Dispense Refill     aspirin 81 MG EC tablet Take 81 mg by mouth daily. Delayed release.       carvedilol (COREG) 12.5 MG tablet Take 1 tablet (12.5 mg total) by mouth 2 (two) times a day with meals. 180 tablet 3     chlorthalidone (HYGROTEN) 25 MG tablet Take 1 tablet (25 mg total) by mouth daily. 45 tablet 3     cloNIDine (CATAPRES-TTS) 0.3 mg/24 hr Place 1 patch on the skin every 7 days.       doxazosin (CARDURA) 4 MG tablet TK 2 TS PO HS  2     fluticasone (FLONASE) 50 mcg/actuation nasal spray 2 sprays into each nostril daily. 48 g  "3     lisinopril (PRINIVIL,ZESTRIL) 40 MG tablet Take 1 tablet (40 mg total) by mouth daily. 90 tablet 2     simvastatin (ZOCOR) 40 MG tablet TAKE 1 TABLET(40 MG) BY MOUTH AT BEDTIME 90 tablet 1     spironolactone (ALDACTONE) 25 MG tablet Take 1 tablet (25 mg total) by mouth daily. 45 tablet 3     No current facility-administered medications for this visit.      Allergies   Allergen Reactions     Atorvastatin Other (See Comments)     Leg Cramps        Family and Social History   Family History   Problem Relation Age of Onset     Hypertension Mother      Diabetes Mother      COPD Mother      Hypertension Father      Diabetes Father      Prostate cancer Father      Pancreatic cancer Father      Hypertension Brother      Prostate cancer Brother      Diabetes Paternal Grandmother      Diabetes Paternal Grandfather      Hypertension Brother      Hypertension Brother         Social History     Tobacco Use     Smoking status: Former Smoker     Packs/day: 1.00     Years: 25.00     Pack years: 25.00     Types: Cigarettes     Last attempt to quit:      Years since quittin.9     Smokeless tobacco: Never Used   Substance Use Topics     Alcohol use: Yes     Alcohol/week: 10.0 standard drinks     Types: 10 Cans of beer per week     Drug use: No        Physical Exam   General Appearance:   No acute distress    BP (!) 144/100 (Patient Site: Left Arm, Patient Position: Sitting, Cuff Size: Adult Regular)   Pulse (!) 56   Ht 5' 8.5\" (1.74 m)   Wt (!) 255 lb (115.7 kg)   SpO2 98%   BMI 38.21 kg/m      EYES: Eyelids, conjunctiva, and sclera were normal. Pupils were normal. Cornea, iris, and lens were normal bilaterally.  HEAD, EARS, NOSE, MOUTH, AND THROAT: Head and face were normal. Hearing was normal to voice and the ears were normal to external exam. Nose appearance was normal and there was no discharge. Oropharynx was normal.  NECK: Neck appearance was normal. There were no neck masses and the thyroid was not " enlarged.  RESPIRATORY: Breathing pattern was normal and the chest moved symmetrically.  Percussion/auscultatory percussion was normal.  Lung sounds were normal and there were no abnormal sounds.  CARDIOVASCULAR: Heart rate and rhythm were normal.  S1 and S2 were normal and there were no extra sounds or murmurs. Peripheral pulses in arms and legs were normal.  Jugular venous pressure was normal.  There was no peripheral edema.  GASTROINTESTINAL: The abdomen was normal in contour.  Bowel sounds were present.  Percussion detected no organ enlargement or tenderness.  Palpation detected no tenderness, mass, or enlarged organs.   MUSCULOSKELETAL: Skeletal configuration was normal and muscle mass was normal for age. Joint appearance was overall normal.  LYMPHATIC: There were no enlarged nodes.  SKIN/HAIR/NAILS: Skin color was normal.  There were no skin lesions.  Hair and nails were normal.  NEUROLOGIC: The patient was alert and oriented to person, place, time, and circumstance. Speech was normal. Cranial nerves were normal. Motor strength was normal for age. The patient was normally coordinated.  PSYCHIATRIC:  Mood and affect were normal and the patient had normal recent and remote memory. The patient's judgment and insight were normal.       Additional Tests   Laboratory: Potassium and creatinine pending    Radiology: Reviewed    Electrocardiogram: Sinus bradycardia with left axis deviation right bundle branch block, personally reviewed    Total time spent with the patient today was 40 minutes of which > 50% was spent in counseling and coordination of care     Waldo Kerr MD  Internal Medicine  Contact me at [phone]

## 2021-06-03 NOTE — PROGRESS NOTES
Procedure cancelled due to bp elevation even after meds taken and allowed to rest. Pt to call primary md and ordering md about biopsy cancellation elevated bp.

## 2021-06-03 NOTE — TELEPHONE ENCOUNTER
Left pt a detailed message to come in on Thursday 11/21/19 @ 678 with Dr. Kerr.  Patient is added to Drs schedule.

## 2021-06-03 NOTE — TELEPHONE ENCOUNTER
Dr. Kerr,  Please review your schedule and see where we might be able to fit the patient in for a pre-op.  Asked the patient if he would be willing to see another provider, but the patient states that he would really like to see you to discuss his recent diagnosis.  Please advise.  Thank you.  Crystal RAZO, CMA/CMT....................10:06 AM

## 2021-06-03 NOTE — PRE-PROCEDURE
Procedure Name: computed tomography guided retroperitoneal lymph node biopsy with moderate sedation  Date/Time: 12/4/2019 10:05 AM    Verbal consent obtained?: Yes  Written consent obtained?: Yes  Risks and benefits: Risks, benefits and alternatives were discussed  Consent given by: patient  Expected level of sedation: moderate  ASA Class: Class 2- mild systemic disease, no acute problems, no functional limitations  Mallampati: Grade 2- soft palate, base of uvula, tonsillar pillars, and portion of posterior pharyngeal wall visible  Patient states understanding of procedure being performed: Yes  Patient's understanding of procedure matches consent: Yes  Procedure consent matches procedure scheduled: Yes  Appropriately NPO: yes  Lungs: lungs clear with good breath sounds bilaterally  Heart: normal heart sounds and rate  History & Physical reviewed: History and physical reviewed and no updates needed  Statement of review: I have reviewed the lab findings, diagnostic data, medications, and the plan for sedation

## 2021-06-03 NOTE — INTERVAL H&P NOTE
I have performed an assessment and examined the patient, as necessary, to update the patient's current status that may have changed since the prior History and Physical.  The History & Physical has been reviewed and updates that have been made are as follows : Blood pressure is elevated with SBP of 220..

## 2021-06-04 VITALS
HEART RATE: 69 BPM | BODY MASS INDEX: 39.56 KG/M2 | DIASTOLIC BLOOD PRESSURE: 100 MMHG | OXYGEN SATURATION: 97 % | TEMPERATURE: 98.5 F | WEIGHT: 264 LBS | SYSTOLIC BLOOD PRESSURE: 214 MMHG

## 2021-06-04 VITALS
BODY MASS INDEX: 38.95 KG/M2 | HEART RATE: 67 BPM | SYSTOLIC BLOOD PRESSURE: 144 MMHG | WEIGHT: 263 LBS | OXYGEN SATURATION: 99 % | HEIGHT: 69 IN | DIASTOLIC BLOOD PRESSURE: 86 MMHG

## 2021-06-04 VITALS
DIASTOLIC BLOOD PRESSURE: 78 MMHG | HEIGHT: 69 IN | BODY MASS INDEX: 39.25 KG/M2 | HEART RATE: 60 BPM | WEIGHT: 265 LBS | SYSTOLIC BLOOD PRESSURE: 136 MMHG | OXYGEN SATURATION: 98 %

## 2021-06-04 VITALS
WEIGHT: 267 LBS | TEMPERATURE: 98.5 F | OXYGEN SATURATION: 96 % | DIASTOLIC BLOOD PRESSURE: 94 MMHG | BODY MASS INDEX: 40.01 KG/M2 | HEART RATE: 64 BPM | SYSTOLIC BLOOD PRESSURE: 169 MMHG

## 2021-06-04 NOTE — TELEPHONE ENCOUNTER
Manas was referred over to us by his pcp, for his metastatic prostate cancer.  He was found to have positive lymph node.  He was started on Firmagon, first dose at Dr. Gaitan's office 12/11/19.  I have scheduled him to see Dr. Cordon 1/3/19.  I mailed him a welcome letter along with a DOMINIQUE to mail back to Letitia Horowitz to obtain outside records if needed.  I reviewed my role with him and encouraged he call me if needed for assistance .

## 2021-06-04 NOTE — PROCEDURES
[facility]    Procedure: Computed tomography guided retroperitoneal lymph node biopsy with moderate sedation  Date/Time: 12/4/2019 11:17 AM  Performed by: Fahrner, Lester J IV, MD  Authorized by: Fahrner, Lester J IV, MD       Universal Protocol    Site marked: Yes    Prior images obtained and reviewed: Yes    Required items: required blood products, implants, devices, and special equipment available    Patient identity confirmed: verbally with patient    Reevaluation: NA - No sedation, light sedation, or local anesthesia    Confirmation checklist: patient's identity using two indicators, relevant allergies, procedure was appropriate and matched the consent or emergent situation and correct equipment/implants were available    Time out: Immediately prior to procedure a time out was called to verify the correct patient, procedure, equipment, support staff and site/side marked as required    Universal Protocol: Joint Commission Universal Protocol was followed    Preparation: Patient was prepped and draped in the usual sterile fashion    ESBL (mL): 0    Anesthesia    Local anesthesia used?: Yes    Anesthesia: local infiltration    Local anesthetic: lidocaine 1% without epinephrine    Anesthetic total (mL): 5    Sedation    Patient sedation: Yes    Sedation type: moderate (conscious) sedation    Sedation: fentanyl, midazolam and see MAR for details    Vital signs: Vital signs monitored during sedation    Post-procedure    Description of procedure: See radiology report for details    Patient tolerance: Patient tolerated the procedure well with no immediate complications   Length of time physician present for 1:1 monitoring during sedation: 30

## 2021-06-04 NOTE — CONSULTS
[facility] Hematology and Oncology Consult Note    Patient: Manas Martinez  MRN: [id number]  Date of Service: 01/03/2020      Reason for Visit:    1.  Newly diagnosed prostate cancer    Assessment/Plan:    1.  Newly diagnosed prostate cancer: I reviewed the biopsy results from the retroperitoneal lymph node biopsy, moderately differentiated.  These show metastatic adenocarcinoma consistent with prostate biopsy.  I reviewed with the patient and his wife that this means he has stage IV disease.  However his bone scan and CT scan did not show any further metastases or evidence of bony involvement or organ involvement.  He seems to have limited metastatic disease.  Reviewed the pathology report from his prostate biopsy.  Also reviewed the MRI report.  There appears to be cancer involving almost the entire gland with broad capsular blurring without obvious evidence of extracapsular extension/invasion.  Appears aggressive by MRI.  On biopsy the highest Rosa grade is 5+4 = 9, the majority appearing to be 4+4 = 8.  We reviewed treatment plans going forward.  He has received a dose of Firmagon.  We will initiate androgen deprivation therapy with Eligard.  We will first start with a 3-month depot as he is planning on leaving [facility].  I reviewed some the more common side effects with him including, but not limited to, fatigue, decreased libido, depression, edema.  We will also start apalutamide 240 mg daily.  Also reviewed some of the more common side effects of this drug with him.  Finally, I think he should be considered for radiation to the gland given his limited metastatic disease.  This is supported by results from the STAMPEDE trial.  Such treatment showed a small but significant improvement in overall survival.  Referral for radiation oncology will be made.      We will also get a prostate-specific (axumin) PETCT for staging and monitoring response to treatment as his PSA is not elevated and thus will not be a reliable  method of following disease activity.    We will refer him to genetics in the near future as he has a family history of prostate cancer.    The patient and his wife had an opportunity to have all her questions answered.  We will see him back in clinic in April.    2.  Adrenal nodule: Right-sided.  Has been seen by surgery.  He has had problems with uncontrolled hypertension.  Concern for an aldosteronoma.  Has been stable on serial imaging.  Will follow for now given his recent diagnosis of prostate cancer.  He is seeing nephrology for management of his hypertension who does not think his hypertension is secondary to this adrenal adenoma.    3.  Left renal mass: Most recently described as exophytic cyst from CT from November 2019.  12 mm.  Stable.  Continue to follow.    ECOG Performance   ECOG Performance Status: 0    Distress Assessment  Distress Assessment Score: 1     Problem List:    1. Prostate cancer metastatic to pelvis (H)     2. Prostate cancer metastatic to intrapelvic lymph node (H)       Staging History:    Cancer Staging  No matching staging information was found for the patient.    History:    Manas is a 59-year-old gentleman with recently diagnosed metastatic prostate cancer.  His diagnosis was made after he initially presented to his primary care with polyuria.  He had a digital rectal exam which was somewhat abnormal.  His PSA was not outside of the normal range.  He saw urology in October.  PSA was 5.  He had a firm prostate.  He went on to have a biopsy as well as an MRI.  MRI was suspicious for PSA in the entire gland.  He went on to have a biopsy of the retroperitoneal node which showed adenocarcinoma.  He was given a dose of Firmagon on December 11, 2019.  He presents today to discuss further follow-up and treatment plans.  He is not having any difficulty urinating but he does have frequency.  No hematuria.  No bony pain.    Past History:    Past Medical History:   Diagnosis Date      Adenocarcinoma of prostate (H)      Allergic rhinitis      Cancer (H)      Coronary artery disease      Deviated septum      Elevated PSA      Hyperlipemia      Hypertension      Joint pain      Myocardial infarction (H)     Non Q wave MI, RCA     Obesity      Other asplenic status      Prediabetes      Sleep apnea      Urinary frequency     Family History   Problem Relation Age of Onset     Hypertension Mother      Diabetes Mother      COPD Mother      Hypertension Father      Diabetes Father      Prostate cancer Father      Pancreatic cancer Father      Hypertension Brother      Prostate cancer Brother      Diabetes Paternal Grandmother      Diabetes Paternal Grandfather      Hypertension Brother      Hypertension Brother       [unfilled] Social History     Socioeconomic History     Marital status:      Spouse name: Cary     Number of children: 0     Years of education: 16     Highest education level: Not on file   Occupational History     Employer: RETIRED   Social Needs     Financial resource strain: Not on file     Food insecurity:     Worry: Not on file     Inability: Not on file     Transportation needs:     Medical: Not on file     Non-medical: Not on file   Tobacco Use     Smoking status: Former Smoker     Packs/day: 1.00     Years: 25.00     Pack years: 25.00     Types: Cigarettes     Last attempt to quit:      Years since quittin.0     Smokeless tobacco: Never Used   Substance and Sexual Activity     Alcohol use: Yes     Alcohol/week: 10.0 standard drinks     Types: 10 Cans of beer per week     Drug use: No     Sexual activity: Not Currently     Partners: Female     Birth control/protection: Surgical     Comment: Partner = Tubaligation   Lifestyle     Physical activity:     Days per week: Not on file     Minutes per session: Not on file     Stress: Not on file   Relationships     Social connections:     Talks on phone: Not on file     Gets together: Not on file     Attends Gnosticism  service: Not on file     Active member of club or organization: Not on file     Attends meetings of clubs or organizations: Not on file     Relationship status: Not on file     Intimate partner violence:     Fear of current or ex partner: Not on file     Emotionally abused: Not on file     Physically abused: Not on file     Forced sexual activity: Not on file   Other Topics Concern     Not on file   Social History Narrative    Lives with his wife, Cary.  Retired , [employer].  No biological children.  Three step children.  Four step child  from leukemia.        Allergies:    Allergies   Allergen Reactions     Atorvastatin Other (See Comments)     Leg Cramps     Review of Systems:    General  General (WDL): All general elements are within defined limits  ENT  ENT (WDL): All ENT elements are within defined limits  Respiratory  Respiratory (WDL): All respiratory elements are within defined limits  Cardiovascular  Cardiovascular (WDL): All cardiovascular elements are within defined limits  Endocrine  Endocrine (WDL): Exceptions to WDL  Hotflashes: Yes -Recent (Less than 3 months)  Gastrointestinal  Gastrointestinal (WDL): All gastrointestinal elements are within defined limits  Musculoskeletal  Musculoskeletal (WDL): Exceptions to WDL  Back Pain: Yes - Chronic (Greater than 3 months)  Neurological  Neurological (WDL): Exceptions to WDL  Dominant Hand: Right  Numbness and/or tingling: Yes - Chronic (Greater than 3 months)(right leg hip to knee)  Psychological/Emotional  Psychological/Emotional (WDL): All psychological/emotional elements are within defined limits  Hematological/Lymphatic  Hematological/Lymphatic (WDL): Exceptions to WDL  Swollen glands: Yes - Recent (Less than 3 months)(neck)  Dermatological  Hair Loss: Yes - Chronic (Greater than 3 months)  Genitourinary/Reproductive  Genitourinary/Reproductive (WDL): Exceptions to WDL  Urinary Frequency: Yes - Recent (Less than 3 months)  Urinary Incontinence:  Yes - Recent (Less than 3 months)  Urination more than 2 times a night: Yes - Recent (Less than 3 months)  Urinary Urgency: Yes - Recent (Less than 3 months)  Difficulty Initiating Urine Stream: Yes - Recent (Less than 3 months)  Reproductive (Females only)     Pain  Currently in Pain: Yes  Pain Score (Initial OR Reassessment): 3  Pain Frequency: Intermittent  Location: back  Pain Characteristics : Aching  Pain Intervention(s): Home medication  Response to Interventions: helpful    Physical Exam:    Recent Vitals 1/3/2020   Height -   Weight 256 lbs   BSA (m2) 2.37 m2   /103   Pulse 56   Temp 97.7   Temp src 1   SpO2 97   Some recent data might be hidden     General: patient appears stated age of 59 y.o.. Nontoxic and in no distress.   HEENT: Head: atraumatic, normocephalic. Sclerae anicteric.  Chest:  Normal respiratory effort  Cardiac:  No edema.   Abdomen: abdomen is soft, non-distended  Extremities: normal tone and muscle bulk.  Skin: no lesions or rash. Warm and dry.   CNS: alert and oriented x3. Grossly non-focal.   Psychiatric: normal mood and affect.     Lab Results:    Results for JADE HUIZAR (MRN [id number]) as of 1/6/2020 10:35   Ref. Range 11/21/2019 16:05 11/26/2019 07:30 11/26/2019 07:38   Potassium Latest Ref Range: 3.5 - 5.0 mmol/L 3.8     Creatinine Latest Ref Range: 0.70 - 1.30 mg/dL 1.26     GFR MDRD Af Amer Latest Ref Range: >60 mL/min/1.73m2 >60     GFR MDRD Non Af Amer Latest Ref Range: >60 mL/min/1.73m2 58 (L)     INR Latest Ref Range: 0.90 - 1.10    0.99   Hemoglobin Latest Ref Range: 14.0 - 18.0 g/dL  14.1    Platelets Latest Ref Range: 140 - 440 thou/uL  332      Imaging Results:    Personally reviewed CT scan and bone scan results.  Some mild retroperitoneal and pelvic adenopathy is noted.    EXAM: NM BONE SCAN WHOLE BODY  LOCATION: [facility]  DATE/TIME: 11/14/2019 12:31 PM    FINDINGS: No abnormal increased or decreased radionuclide activity. Specifically, there is no  uptake to correspond with a presumed bone island in the left iliac bone on CT.     IMPRESSION:   1.  No evidence of skeletal metastases.      EXAM: CT ABDOMEN PELVIS WO ORAL W IV CONTRAST  LOCATION: [facility]  DATE/TIME: 11/14/2019 9:52 AM     FINDINGS:   LOWER CHEST: Normal.     HEPATOBILIARY: Normal.     PANCREAS: Normal.     SPLEEN: Absent.     ADRENAL GLANDS: Stable 3 cm benign right adrenal adenoma.     KIDNEYS/BLADDER: Stable 12 mm exophytic cyst left kidney. Kidneys otherwise negative.     BOWEL: Normal.     LYMPH NODES: New mild retroperitoneal lymphadenopathy. The largest node at the level of the aortic bifurcation measures 1.4 cm. Image 222. Adjacent node measures 1.0 cm. Mild pelvic lymphadenopathy along the internal iliac chains bilaterally. Largest on   the right measures 1.8 cm. Largest on the left 1.6 cm.     VASCULATURE: Unremarkable.     PELVIC ORGANS: Prostatic hypertrophy.     OTHER: None.     MUSCULOSKELETAL: No concerning bone lesions. Focal sclerosis left iliac bone 11 mm appearance most consistent with bone island. Degenerative disc disease L4-L5 level.     IMPRESSION:   1.  Mild lower retroperitoneal and pelvic lymphadenopathy. Most likely represents metastatic disease. Needed, the left retroperitoneal lymph node at the aortic bifurcation potentially could be biopsied. Image 222.     2.  Tiny focal sclerosis left iliac bone has the appearance of a bone island.     3.  No other concerning findings.     4.  Stable 12 mm cyst left kidney.     5.  Stable right adrenal adenoma.    Pathology:    Final Diagnosis   RETROPERITONEAL LYMPH NODE, NEEDLE CORE BIOPSY:     -   METASTATIC PROSTATIC ADENOCARCINOMA     MCSS   Electronically signed by Rei Vazquez MD on 12/5/2019 at 1316   Comment     The clinical history has been reviewed. Cytology and histology demonstrate a moderately differentiated prostatic adenocarcinoma with prominent nucleoli. The tumor cells are positive for PSA and  negative for CK7 and CK20. Clinical correlation is required.        Signed by: Miguel Cordon MD

## 2021-06-05 VITALS
WEIGHT: 257 LBS | SYSTOLIC BLOOD PRESSURE: 147 MMHG | DIASTOLIC BLOOD PRESSURE: 82 MMHG | TEMPERATURE: 98.7 F | BODY MASS INDEX: 37.95 KG/M2 | HEART RATE: 61 BPM | OXYGEN SATURATION: 96 %

## 2021-06-05 VITALS
SYSTOLIC BLOOD PRESSURE: 150 MMHG | OXYGEN SATURATION: 96 % | BODY MASS INDEX: 40.29 KG/M2 | DIASTOLIC BLOOD PRESSURE: 92 MMHG | WEIGHT: 272 LBS | HEIGHT: 69 IN | HEART RATE: 72 BPM

## 2021-06-05 VITALS
DIASTOLIC BLOOD PRESSURE: 102 MMHG | OXYGEN SATURATION: 99 % | HEART RATE: 73 BPM | BODY MASS INDEX: 39.28 KG/M2 | TEMPERATURE: 98.5 F | WEIGHT: 266 LBS | SYSTOLIC BLOOD PRESSURE: 173 MMHG

## 2021-06-05 VITALS — HEIGHT: 69 IN | WEIGHT: 267 LBS | BODY MASS INDEX: 39.55 KG/M2

## 2021-06-05 VITALS — WEIGHT: 272 LBS | HEIGHT: 69 IN | BODY MASS INDEX: 40.29 KG/M2

## 2021-06-05 VITALS
SYSTOLIC BLOOD PRESSURE: 136 MMHG | HEART RATE: 70 BPM | BODY MASS INDEX: 38.14 KG/M2 | HEIGHT: 69 IN | OXYGEN SATURATION: 96 % | DIASTOLIC BLOOD PRESSURE: 82 MMHG | WEIGHT: 257.5 LBS

## 2021-06-05 NOTE — TELEPHONE ENCOUNTER
Oral Chemotherapy Monitoring Program   Left Voicemail    Attempted to contact patient today to schedule labs for oral chemotherapy, apalutamide, for prostate cancer. No answer. Left voicemail for patient to call back to clinic. No medication name was left.    Nancy Moreno PharmD, [facility]  [phone]  02/03/2020     ADDENDUM    Primary Oncologist: Dr. Cordon  Primary Oncology Clinic: [facility]  Cancer Diagnosis: Prostate     Therapy History:  Apalutamide (Erleada) 240mg daily  Start Date: 2020-01-11     Lab Monitoring Plan  CBC, CMP monthly; thyroid and lipid panels periodically (every 4 months)  Subjective/Objective:  Manas Martinez is a 60 y.o. male contacted by phone for a follow-up visit for oral chemotherapy.  Pt reports tolerating well, denies new or changing side effects, denies missed doses. Pt is currently in [place], planning to travel Friday 2/7/2020, will come in for labs this week.     Assessment/Plan:  Pt is tolerating treatment, continue as directed.        Nancy Moreno PharmD, [facility]  [phone]  02/03/2020

## 2021-06-05 NOTE — CONSULTS
Consults   [facility] Radiation Oncology Consult Note     Patient: Manas Martinez  MRN: [id number]  Date of Service: 01/09/2020          Miguel Cordon MD  [address] [place] [address]       Dear Dr. Cordon:    Thank you very much for referring this patient for consideration of radiotherapy. As you know Mr. Martinez is a 59 y.o. male with a diagnosis of stage IV prostate cancer, stage T3 N1 M1a with limited metastatic disease, [place] grade 4+4 = 8 and 5+4 = 9 involving both side of prostate gland with pathologic evidence of crescencio-aortic lymph node metastasis.  The patient initiated Eligard hormone therapy and is referred to radiation oncology for evaluation and discussion of possible treatment options including radiation therapy.    HISTORY OF PRESENT ILLNESS:   Mr. Martinez is a 59 y.o. male who has been in his usual state of health until recently. he initially presented to his primary care with dysuria including frequency, urgency and weak stream. He had a digital rectal exam which was somewhat abnormal.  His PSA was 3.9 and is not outside of the normal range.  He is a previous PSA was 3.2 on 9/10/2018.  Patient was then referred to Dr. Zach Gaitan, urology  for further evaluation.  The rectal demonstrated reviewed 40 g prostate with firmness bilaterally worrisome for malignancy.  MRI of the prostate on 10/25/2019 also showed PI-RADS 5 extensive prostatic tumor infiltration with sprawled capsular poorly and possible seminal vesicle involvement.  There was also a mildly large pelvic lymph nodes concerning for metastatic disease.  Patient underwent transrectal ultrasound study and biopsy on 11/1/2019 and pathology confirmed diagnosis of adenocarcinoma, [place] grade 4+4=8 and 5+4 = 9 involving both side of the prostate gland.  Patient then underwent staging work-up including CT abdomen pelvis, bone scan, and PET CT scan.  The scan showed bilateral external iliac, left common iliac, aortocaval, and the left  retroperitoneal lymphadenopathy consistent with prostate cancer with lymph node metastasis.  There is no radiographic evidence of systemic metastasis.  Patient also underwent CT-guided biopsy of the retroperitoneal lymph node and the pathology confirmed metastatic prostate adenocarcinoma.  Patient was seen and evaluated by Dr. Cordon, med oncology earlier.  He has received a dose of Firmagon and plan to start androgen deprivation therapy with Eligard.  Given the limited metastatic disease, the patient is referred to radiation oncology for evaluation and consideration of possible radiation therapy to the prostate gland as supported by the recent phase 3 clinical trials from the STAMPEDE trial published in Lancet 2018.    CHEMOTHERAPY HISTORY: Concurrent Chemotherapy: No    RADIATION THERAPY HISTORY: Prior Radiation: No    IMPLANTED CARDIAC DEVICE: none     Current Outpatient Medications   Medication Sig Dispense Refill     aspirin 81 MG EC tablet Take 81 mg by mouth daily. Delayed release.       carvedilol (COREG) 12.5 MG tablet Take 1 tablet (12.5 mg total) by mouth 2 (two) times a day with meals. 180 tablet 3     chlorthalidone (HYGROTEN) 25 MG tablet Take 1 tablet (25 mg total) by mouth daily. 45 tablet 3     cloNIDine (CATAPRES-TTS) 0.3 mg/24 hr Place 1 patch on the skin every 7 days.       doxazosin (CARDURA) 4 MG tablet TK 2 TS PO HS  2     fluticasone (FLONASE) 50 mcg/actuation nasal spray 2 sprays into each nostril daily. 48 g 3     lisinopril (PRINIVIL,ZESTRIL) 40 MG tablet Take 1 tablet (40 mg total) by mouth daily. 90 tablet 2     simvastatin (ZOCOR) 40 MG tablet TAKE 1 TABLET(40 MG) BY MOUTH AT BEDTIME 90 tablet 1     spironolactone (ALDACTONE) 25 MG tablet Take 1 tablet (25 mg total) by mouth daily. 45 tablet 3     No current facility-administered medications for this visit.      Past Medical History:   Diagnosis Date     Adenocarcinoma of prostate (H)      Allergic rhinitis      Cancer (H)      Coronary  artery disease      Deviated septum      Elevated PSA      Hyperlipemia      Hypertension      Joint pain      Myocardial infarction (H)     Non Q wave MI, RCA     Obesity      Other asplenic status      Prediabetes      Sleep apnea      Urinary frequency      Past Surgical History:   Procedure Laterality Date     BICEPS TENDON REPAIR Left      [place]     CORONARY STENT PLACEMENT  1999    x2 RCA,     CT BIOPSY RETROPERITONEAL MASS  2019     FINGER TENDON REPAIR Left      NM REMOVAL SPLEEN, TOTAL  1972    Description: Splenectomy;  Recorded: 2011;, accident     prostrate biopsy       WISDOM TOOTH EXTRACTION       Atorvastatin  Family History   Problem Relation Age of Onset     Hypertension Mother      Diabetes Mother      COPD Mother      Hypertension Father      Diabetes Father      Prostate cancer Father      Pancreatic cancer Father      Hypertension Brother      Prostate cancer Brother      Diabetes Paternal Grandmother      Diabetes Paternal Grandfather      Hypertension Brother      Hypertension Brother      Social History     Socioeconomic History     Marital status:      Spouse name: Cary     Number of children: 0     Years of education: 16     Highest education level: Not on file   Occupational History     Employer: RETIRED   Social Needs     Financial resource strain: Not on file     Food insecurity:     Worry: Not on file     Inability: Not on file     Transportation needs:     Medical: Not on file     Non-medical: Not on file   Tobacco Use     Smoking status: Former Smoker     Packs/day: 1.00     Years: 25.00     Pack years: 25.00     Types: Cigarettes     Last attempt to quit:      Years since quittin.0     Smokeless tobacco: Never Used   Substance and Sexual Activity     Alcohol use: Yes     Alcohol/week: 10.0 standard drinks     Types: 10 Cans of beer per week     Drug use: No     Sexual activity: Not Currently     Partners: Female     Birth  control/protection: Surgical     Comment: Partner = Tubaligation   Lifestyle     Physical activity:     Days per week: Not on file     Minutes per session: Not on file     Stress: Not on file   Relationships     Social connections:     Talks on phone: Not on file     Gets together: Not on file     Attends Faith service: Not on file     Active member of club or organization: Not on file     Attends meetings of clubs or organizations: Not on file     Relationship status: Not on file     Intimate partner violence:     Fear of current or ex partner: Not on file     Emotionally abused: Not on file     Physically abused: Not on file     Forced sexual activity: Not on file   Other Topics Concern     Not on file   Social History Narrative    Lives with his wife, Cary.  Retired , [employer].  No biological children.  Three step children.  Four step child  from leukemia.        Review of Systems:      General  Constitutional (WDL): All constitutional elements are within defined limits  EENT  Eye Disorder (WDL): All eye disorder elements are within defined limits  Ear Disorder (WDL): All ear disorder elements are within defined limits  Respiratory   Respiratory (WDL): Within Defined Limits  Cardiovascular  Cardiovascular (WDL): Exceptions to WDL(HTN)  Endocrine     Gastrointestinal  Gastrointestinal (WDL): All gastrointestinal elements are within defined limits  Musculoskeletal  Musculoskeletal and Connetive Tissue Disorders (WDL): Exceptions to WDL(chronic back pain)  Arthralgia: Mild pain  Myalgia: Mild pain  Integumentary               Integumentary (WDL): All integumentary elements are within defined limits  Neurological  Neurosensory (WDL): Exceptions to WDL  Peripheral Sensory Neuropathy: Asymptomatic, loss of deep tendon reflexes or paresthesia(numbness/tingling in R leg, old back injury)  Psychological/Emotional   Patient Coping: Accepting;Open/discussion  Hematological/Lymphatic  Lymph (WDL): All lymph  disorder elements are within defined limits  Dermatologic     Genitourinary/Reproductive  Genitourinary (WDL): Exceptions to WDL  Urinary Frequency: Limiting instrumental ADL, medical management indicated(every 2 hours, also urgency)  Urinary Retention: Urinary, suprapubic or intermittent catheter placement not indicated, able to void with some residual  Reproductive     Pain              Currently in Pain: Yes  Pain Score (Initial OR Reassessment): 3  Pain Frequency: Constant/continuous  Location: back  Pain Intervention(s): Home medication  Accompanied by  Accompanied by: Family Member    Imaging: Reviewed    Pathology: Reviewed    Lab Results   Component Value Date    PSA 0.8 01/08/2020    PSA 3.9 06/03/2019    PSA 3.2 09/10/2018     ECOG Peformance Status  ECOG Performance Status: 1  Distress Assessment Score: 5(disease, treatment planning)    Objective:     PHYSICAL EXAMINATION:    BP (!) 193/87   Pulse 60   Temp 98.1  F (36.7  C) (Oral)   SpO2 96%     Gen: Alert, in NAD  Back: No step-offs or pain to palpation along the thoracolumbar spine  Rectal: Deferred  : Deferred  Musculoskeletal: Normal muscle bulk and tone  Skin: Normal color and turgor  Neurologic: A/Ox3, CN II-XII intact, normal gait and station  Psychiatric: Appropriate mood and affect     Impression     Stage IV prostate cancer, stage T3 N1 M1a with limited metastatic disease, Dixon grade 4+4 = 8 and 5+4 = 9 involving both side of prostate gland with pathologic evidence of crescencio-aortic lymph node metastasis.  The patient initiated Eligard hormone therapy and is referred to radiation oncology for evaluation and discussion of possible treatment options including radiation therapy.    Assessment & Plan:     I have personally reviewed his upcoming medical record today.  I have also reviewed his most recent radiology study including MRI, CT, and PET CT scan.  The possible treatment options for prostate cancer including surgery, systemic therapy,  and radiation therapy has been discussed with the patient in detail and at the great lengths.  The possible risks and side effects of radiation therapy has also been explained to the patient.  This is a 59-year-old gentleman with unfortunate diagnosis of stage IV high-grade prostate cancer with multiple lymph node metastasis.  His staging work-up indicating no evidence of systemic metastasis.  The patient already started his first hormone therapy with good response.  The NCCN guideline for stage IV prostate cancer treatment recommendation has been reviewed with the patient.  Given the good health status, and a limited metastatic disease, I agree the patient is good candidate for radiation therapy targeted to the prostate gland region as supported by the recent phase 3 clinical trials from the STAMPEDE trial published in Lancet 2018.  The pros and cons of different options has also been discussed with the patient in detail and at the great lengths.  Questions are answered to patient's satisfaction.  He is going to think it over and make his final decision in the next few days.  I will also discuss his case at next  oncology conference to get a consensus recommendation.  I will contact patient again after the tumor conference.      Again, thank you very much for the referral and allowing me to participate in the care of this patient.  If you have any questions or concerns about this consultation, please do not hesitate to call.  I spent approximately 45 minutes today with the patient and 80% time was used for counseling.      Sincerely,        Daisha Keller MD, PhD  Department of Radiation Oncology   [facility]  Tel: [phone]  Page: [phone]    [facility]  [address] [place] [address]     [place] [id number]   [place] [address]    CC:  Patient Care Team:  Waldo Kerr MD as PCP - General (Internal Medicine)  Pratik Rico DO as Assigned  PCP  Waldo Kerr MD as Assigned PCP  Daya Rodriguez, RN as Oncology Nurse Navigator  Miguel Cordon MD as Physician (Hematology and Oncology)  Zach Gaitan MD as Physician (Urology)  Daisha Keller MD as Physician (Radiation Oncology)

## 2021-06-05 NOTE — TELEPHONE ENCOUNTER
Manas,    I wish that I would be able to answer your questions and add more to recommendations from Dr. Gaitan, Dr. Perea and Dr. Cordon.  This is quite specialized field and I would follow their recommendation.  If you wanted a second opinion I think [facility] would be an excellent option.  Radiation is a nice treatment option but it damages surrounding structures and my impression is that the higher lymph nodes are too close to vital structures including vascular structures which of damage could be quite problematic.    I hope this helps and I wish I had more expertise to assist even more.    EDENILSON

## 2021-06-05 NOTE — PROGRESS NOTES
Pt here for first eligard which was given SQ in L arm. bandaid applied and pt d/c ambulatory to lobby to meet his wife. Pt aware of treatment plan.

## 2021-06-05 NOTE — TELEPHONE ENCOUNTER
I WAS ABLE TO GET JADE A COPAY CARD FOR THE ERLEADA ID: [id number] GRP: [id number], BIN: [id number] THROUGH 12/31/2020.

## 2021-06-05 NOTE — TELEPHONE ENCOUNTER
Call placed to patient per Dr Cordon to see how he is doing with the oral chemotherapy and to find out if he is planning to proceed with radiation. Patient states he is doing great with the medication and no side effects.  Patient also states he is still considering radiation but has not made any decisions.  Patient will contact us after his [place] consult or wait till April follow up appointment.  Pt advised to call us if he has any further concerns.  Pt verbalized understanding.

## 2021-06-05 NOTE — PROGRESS NOTES
Pt here with his spouse for consult with Dr. Keller. Chronic back pain, also c/o urinary frequency and urgency. I gave him a new patient folder and we discussed the routine for pelvic and prostate RT. He verbalized his understanding.

## 2021-06-05 NOTE — TELEPHONE ENCOUNTER
Oral Chemotherapy Monitoring Program    Primary Oncologist: Dr. Cordon  Primary Oncology Clinic: [facility]  Cancer Diagnosis: Prostate    Therapy History:  Apalutamide (Erleada) 240mg daily  Start Date: 2020-01-11    Drug Interaction Assessment: No new medications    Lab Monitoring Plan  CBC, CMP monthly; thyroid and lipid panels periodically (every 4 months)  Subjective/Objective:  Manas Martinez is a 60 y.o. male contacted by phone for a follow-up visit for oral chemotherapy.  Pt reports starting medication 1/11/2020, denies missed doses. Denies new or changing side effects. Pt is currently in [place], not sure for how long. Will contact at the beginning of February to set up lab appointment.    Assessment/Plan:  Pt is tolerating treatment, continue as directed.    Follow-Up:  2020-02-03 pharmacist phone follow up to schedule labs    Nancy Moreno, PharmD, BCOP  [facility]  [phone]  01/17/2020

## 2021-06-05 NOTE — TELEPHONE ENCOUNTER
Order along with Dr. Cordon and Dr. Keller's consult notes were faxed to [phone] to get the pt scheduled for Fiducial Placement. 17 pages faxed successfully.

## 2021-06-05 NOTE — TELEPHONE ENCOUNTER
Oral Chemotherapy Monitoring Program  Lab Follow Up    Patient currently on apalutamide therapy for prostate cancer.    Reviewed lab results from 2/6/2020.    Labs:  Lab Results   Component Value Date    WBC 6.2 02/06/2020    HGB 12.7 (L) 02/06/2020    HCT 37.8 (L) 02/06/2020    MCV 90 02/06/2020     02/06/2020     Results for orders placed or performed in visit on 02/06/20   Comprehensive Metabolic Panel   Result Value Ref Range    Sodium 142 136 - 145 mmol/L    Potassium 3.7 3.5 - 5.0 mmol/L    Chloride 104 98 - 107 mmol/L    CO2 30 22 - 31 mmol/L    Anion Gap, Calculation 8 5 - 18 mmol/L    Glucose 96 70 - 125 mg/dL    BUN 22 8 - 22 mg/dL    Creatinine 1.13 0.70 - 1.30 mg/dL    GFR MDRD Af Amer >60 >60 mL/min/1.73m2    GFR MDRD Non Af Amer >60 >60 mL/min/1.73m2    Bilirubin, Total 0.5 0.0 - 1.0 mg/dL    Calcium 9.8 8.5 - 10.5 mg/dL    Protein, Total 7.2 6.0 - 8.0 g/dL    Albumin 3.8 3.5 - 5.0 g/dL    Alkaline Phosphatase 52 45 - 120 U/L    AST 21 0 - 40 U/L    ALT 27 0 - 45 U/L         Assessment & Plan:  No concerning abnormalities, appropriate to continue treatment.  - Grade 1 anemia - continue to monitor, no dose reduction required at this time.    Questions answered to patient's satisfaction.    Follow-Up:  03- repeat labs  04- office visit and labs Dr. Bravo Moreno, PharmD, BCOP  [facility]  [phone]  02/06/2020

## 2021-06-05 NOTE — PROGRESS NOTES
"Oral Chemotherapy Monitoring Program    Primary Oncologist: Dr. Cordon  Primary Oncology Clinic: [facility]  Cancer Diagnosis: Prostate    Drug: Apalutamide (Erleada) 240mg daily  Start Date: 2020-01-11  Dose is appropriate for patients: renal, hepatic function   Expected duration of therapy: until unacceptable toxicity or disease progression    Drug Interaction Assessment:  Upon review of medication list, the following potential drug interactions were identified:  - Apalutamide + doxazosin, simvastatin - apalutamide may increase the metabolism of doxazosin and simvastatin, monitor blood pressure and cholesterol for need for therapy adjustments    Lab Monitoring Plan  CBC, CMP monthly; thyroid and lipid panels periodically (every 4 months)  Subjective/Objective:  Manas Martinez is a 59 y.o. male contacted by phone for an initial visit for oral chemotherapy education. Patient notes he will be traveling in the next few months but is willing to get labs for review 3-4 weeks after starting.    Vitals:  BP:   BP Readings from Last 1 Encounters:   01/09/20 (!) 193/87     Wt Readings from Last 1 Encounters:   01/03/20 (!) 116.1 kg (256 lb)     Estimated body surface area is 2.37 meters squared as calculated from the following:    Height as of 12/4/19: 5' 8.5\" (1.74 m).    Weight as of 1/3/20: 116.1 kg (256 lb).    Labs:  Lab Results   Component Value Date    WBC 7.1 01/08/2020    HGB 13.7 (L) 01/08/2020    HCT 40.7 01/08/2020    MCV 90 01/08/2020     01/08/2020     Results for orders placed or performed in visit on 01/08/20   Comprehensive Metabolic Panel   Result Value Ref Range    Sodium 142 136 - 145 mmol/L    Potassium 3.9 3.5 - 5.0 mmol/L    Chloride 105 98 - 107 mmol/L    CO2 30 22 - 31 mmol/L    Anion Gap, Calculation 7 5 - 18 mmol/L    Glucose 118 70 - 125 mg/dL    BUN 32 (H) 8 - 22 mg/dL    Creatinine 1.17 0.70 - 1.30 mg/dL    GFR MDRD Af Amer >60 >60 mL/min/1.73m2    GFR MDRD " Non Af Amer >60 >60 mL/min/1.73m2    Bilirubin, Total 0.7 0.0 - 1.0 mg/dL    Calcium 9.3 8.5 - 10.5 mg/dL    Protein, Total 7.5 6.0 - 8.0 g/dL    Albumin 3.8 3.5 - 5.0 g/dL    Alkaline Phosphatase 56 45 - 120 U/L    AST 16 0 - 40 U/L    ALT 23 0 - 45 U/L       Assessment:  Patient is appropriate to start therapy.     Plan:  Basic chemotherapy teaching was reviewed with patient including indication, start date of therapy, dose, administration, adverse effects, missed doses, food and drug interactions, monitoring, side effect management, office contact information, and safe handling. Written materials were provided and all questions answered.    Follow-Up:  2020-01-17 pharmacist phone follow up  2020-02-01 lab check, location TBD  2020-04-14 office visit with Dr. Bravo Moreno, PharmD, BCOP  [facility]  [phone]  01/10/20

## 2021-06-06 NOTE — TELEPHONE ENCOUNTER
Oral Chemotherapy Monitoring Program   Left Voicemail    Attempted to contact patient today for follow up regarding oral chemotherapy, apalutamide, for Prostate Cancer. No answer. Left voicemail for patient to call back to clinic if they have any concerns. No medication name was left.    Patient will need monthly labs this week or next.    Nancy Moreno PharmD, [facility]  [phone]  03/02/2020     ADDENDUM     Primary Oncologist: Dr. Cordon  Primary Oncology Clinic: [facility]  Cancer Diagnosis: Prostate     Therapy History:  Apalutamide (Erleada) 240mg daily  Start Date: 2020-01-11     Lab Monitoring Plan  CBC, CMP monthly; thyroid and lipid panels periodically (every 4 months)  Subjective/Objective:  Manas Martinez is a 60 y.o. male contacted by phone for a follow-up visit for oral chemotherapy.  Pt reports tolerating well, denies new or changing side effects, denies missed doses. Pt is currently in [place], planning to travel later this week back to [place]. Will come in for labs.     Assessment/Plan:  Pt is tolerating treatment, continue as directed.    Nancy Moreno PharmD, [facility]  [phone]  03/02/2020

## 2021-06-06 NOTE — TELEPHONE ENCOUNTER
Call received from [facility] letting us know that the patient stated that he was holding off on getting the fiducial placed as he hasn't decided if he is going to proceed with Radiation or not.  He will however, still be getting the Eligard injections.  I've let Dr. Keller know of this as well.

## 2021-06-06 NOTE — TELEPHONE ENCOUNTER
Oral Chemotherapy Monitoring Program  Lab Follow Up    Patient currently on apalutamide therapy for prostate cancer. Attempted to contact patient, left voicemail, no medication names were mentioned.     Reviewed lab results from 3/11/2020.    Labs:  Lab Results   Component Value Date    WBC 6.6 03/11/2020    HGB 13.1 (L) 03/11/2020    HCT 38.8 (L) 03/11/2020    MCV 92 03/11/2020     03/11/2020     Results for orders placed or performed in visit on 03/11/20   Comprehensive Metabolic Panel   Result Value Ref Range    Sodium 140 136 - 145 mmol/L    Potassium 3.0 (L) 3.5 - 5.0 mmol/L    Chloride 99 98 - 107 mmol/L    CO2 34 (H) 22 - 31 mmol/L    Anion Gap, Calculation 7 5 - 18 mmol/L    Glucose 109 70 - 125 mg/dL    BUN 17 8 - 22 mg/dL    Creatinine 0.96 0.70 - 1.30 mg/dL    GFR MDRD Af Amer >60 >60 mL/min/1.73m2    GFR MDRD Non Af Amer >60 >60 mL/min/1.73m2    Bilirubin, Total 0.5 0.0 - 1.0 mg/dL    Calcium 9.4 8.5 - 10.5 mg/dL    Protein, Total 7.3 6.0 - 8.0 g/dL    Albumin 3.7 3.5 - 5.0 g/dL    Alkaline Phosphatase 55 45 - 120 U/L    AST 21 0 - 40 U/L    ALT 24 0 - 45 U/L       Assessment & Plan:  Results are concerning for potassium 3.0. Asked patient to return call to discuss dietary changes, will also follow up with [employer] message.  - Grade 1 anemia - continue to monitor, no dose reduction required at this time.    Follow-Up:  4/14/2020 office visit and labs with Dr. Bravo Moreno, PharmD, BCOP  [facility]  [phone]  03/12/2020    ADDENDUM  Pt returned call. Discussed dietary changes for increasing potassium. All questions were answered to patient's stated satisfaction.

## 2021-06-07 NOTE — PROGRESS NOTES
[facility] Hematology and Oncology Consult Note    Patient: Manas Martinez  MRN: [id number]  Date of Service: 04/21/2020      Reason for Visit:    1.  Stage IV prostate cancer    Phone visit for COVID precautionary measures    Assessment/Plan:    1.  Stage IV prostate cancer (limited disease to retroperitoneal nodes):   He initiated androgen deprivation therapy with Eligard in January and had a second dose last week. Side effects have included hot flashes several times a day, but he finds manageable. I did discuss he may become more tolerable to this or we can try the option of monthly dosing. He will continue every 3 mths for now (due again in July).    He is also on apalutamide 240 mg daily.  His cholesterol is up, which is a likely side effect. He has been on statin long-term for pre-existing hyperlipidemia, but has been holding this the past 4-6 weeks for restless legs. He will try resuming his statin and we will recheck lipid panel in 6 weeks. If still elevated, we will consider dose-reducing the apalutamide to 180 mg.     He met with Dr. Keller regarding radiation given his limited metastatic disease (based off the STAMPEDE trial).  Dr. Keller has recommended radiation, but is deferring due to COVID.    Will return in 3 mths. Since his treatment response is not reliably assessed with PSA (was not elevated at time of diagnosis), will update a prostate-specific PET to compare to baseline.    2.  Bilateral hip pain  Two month onset, mild and stable. Could be arthralgias from the treatment. He will try Naprosyn for 1-2 weeks as needed. Will get prostate PET at next visit in 3 mths to rule-out mets, but he will call if pain progresses in next 1-2 mths so we can bring him in sooner for that.    3.  Adrenal nodule, right:   Has been seen by surgery.  He has had problems with uncontrolled hypertension.  Concern for an aldosteronoma.  Has been stable on serial imaging.  Will follow for now given his recent diagnosis of  prostate cancer.  He is seeing nephrology for management of his hypertension who does not think his hypertension is secondary to this adrenal adenoma.    4.  Left renal mass:   Most recently described as exophytic cyst from CT from November 2019.  12 mm.  Stable.  Continue to follow.    5. Hyperlipidemia  He stopped his statin a month ago for restless leg side effect. His lipid panel is worse, which is either due to apalutamide and/or being off the statin. He is comfortable resuming the statin and we will recheck lipids in 6 weeks. If still elevated, will dose-reduce the apalutamide.    6. Hypokalemia  Potassium was low in march, but was not rechecked last week when here. Will recheck in 6 weeks with other labwork.    ECOG Performance   ECOG Performance Status: 0    Distress Assessment  Distress Assessment Score: 2     Problem List:      Staging History:    Cancer Staging  Prostate cancer metastatic to intrapelvic lymph node (H)  Staging form: Prostate, AJCC 8th Edition  - Clinical stage from 1/6/2020: Stage IVB (cT2c, cN1, pM1a, PSA: 5, Grade Group: 5) - Signed by Miguel Cordon MD on 1/6/2020    Interim History:  3 month visit conducted via phone call today. His urinary frequency is stable, no new urinary symptoms.  No hematuria.  He has had two-month history of bilateral mild hip aching, not progressing. He is still very active without limitation. Chronic pre-existing low back pain stable.    Tolerating treatment fairly well, biggest complaint is hot flashes. He has several/day but feels they are manageable.     He stopped taking his statin 4-6 wks ago for restless legs.     Oncologic History:  October, 2019: Stage IV prostate cancer to retroperitoneal nodes  -Fall, 2019: presented with polyuria.  Digital rectal exam abnormal.  PSA was WNL.    -October, 2019: Urology consult. PSA was 5.  He had a firm prostate.    -Biopsy prostate: highest Rosa grade 5+4=9 (majorty 4+4=8)  -Biopsy: retroperitoneal node +  adenocarcinoma  -MRI: suspicious for cancer of entire gland without obvious extracapsular extension/invastion.    -CT and bone scan: no evident distant mets    Treatment:  12/11/2019:  Firmagon     1/8/2020: Lupron every 3 mths + Apalutamide 240 mg daily    Allergies:    Allergies   Allergen Reactions     Atorvastatin Other (See Comments)     Leg Cramps       Physical Exam:    Recent Vitals 4/14/2020   Height -   Weight -   BSA (m2) -   /110   Pulse 62   Temp -   Temp src -   SpO2 96   Some recent data might be hidden     General: Alert and oriented x 3.    Lab Results:  PSA <0.1  Triglycerides 229, Cholesterol 315,   TSH within normal limits    Imaging Results:  None    Total time 20 min on phone    Signed by: Lyudmila Jama CNP

## 2021-06-07 NOTE — TELEPHONE ENCOUNTER
Refill Approved    Rx renewed per Medication Renewal Policy. Medication was last renewed on 6/3/19.    Lisa Meza, [facility] Triage/Med Refill 3/23/2020     Requested Prescriptions   Pending Prescriptions Disp Refills     lisinopriL (PRINIVIL,ZESTRIL) 40 MG tablet [Pharmacy Med Name: LISINOPRIL 40MG TABLETS] 90 tablet 2     Sig: TAKE 1 TABLET(40 MG) BY MOUTH DAILY       Ace Inhibitors Refill Protocol Passed - 3/21/2020  2:26 PM        Passed - PCP or prescribing provider visit in past 12 months       Last office visit with prescriber/PCP: 6/3/2019 Pratik Rico DO OR same dept: 6/3/2019 Pratik Rico DO OR same specialty: 6/3/2019 Pratik Rico DO  Last physical: 9/10/2018 Last MTM visit: Visit date not found   Next visit within 3 mo: Visit date not found  Next physical within 3 mo: Visit date not found  Prescriber OR PCP: Pratik Rico DO  Last diagnosis associated with med order: 1. Essential hypertension  - lisinopriL (PRINIVIL,ZESTRIL) 40 MG tablet [Pharmacy Med Name: LISINOPRIL 40MG TABLETS]; TAKE 1 TABLET(40 MG) BY MOUTH DAILY  Dispense: 90 tablet; Refill: 2    2. Pure hypercholesterolemia  - simvastatin (ZOCOR) 40 MG tablet [Pharmacy Med Name: SIMVASTATIN 40MG TABLETS]; TAKE 1 TABLET(40 MG) BY MOUTH AT BEDTIME  Dispense: 90 tablet; Refill: 1    If protocol passes may refill for 12 months if within 3 months of last provider visit (or a total of 15 months).             Passed - Serum Potassium in past 12 months     Lab Results   Component Value Date    Potassium 3.0 (L) 03/11/2020             Passed - Blood pressure filed in past 12 months     BP Readings from Last 1 Encounters:   01/09/20 (!) 193/87             Passed - Serum Creatinine in past 12 months     Creatinine   Date Value Ref Range Status   03/11/2020 0.96 0.70 - 1.30 mg/dL Final                simvastatin (ZOCOR) 40 MG tablet [Pharmacy Med Name: SIMVASTATIN 40MG TABLETS] 90 tablet 1      Sig: TAKE 1 TABLET(40 MG) BY MOUTH AT BEDTIME       Statins Refill Protocol (Hmg CoA Reductase Inhibitors) Passed - 3/21/2020  2:26 PM        Passed - PCP or prescribing provider visit in past 12 months      Last office visit with prescriber/PCP: 6/3/2019 Pratik Rico DO OR same dept: 6/3/2019 Pratik Rico DO OR same specialty: 6/3/2019 Pratik Rico DO  Last physical: 9/10/2018 Last MTM visit: Visit date not found   Next visit within 3 mo: Visit date not found  Next physical within 3 mo: Visit date not found  Prescriber OR PCP: Pratik Rico DO  Last diagnosis associated with med order: 1. Essential hypertension  - lisinopriL (PRINIVIL,ZESTRIL) 40 MG tablet [Pharmacy Med Name: LISINOPRIL 40MG TABLETS]; TAKE 1 TABLET(40 MG) BY MOUTH DAILY  Dispense: 90 tablet; Refill: 2    2. Pure hypercholesterolemia  - simvastatin (ZOCOR) 40 MG tablet [Pharmacy Med Name: SIMVASTATIN 40MG TABLETS]; TAKE 1 TABLET(40 MG) BY MOUTH AT BEDTIME  Dispense: 90 tablet; Refill: 1    If protocol passes may refill for 12 months if within 3 months of last provider visit (or a total of 15 months).

## 2021-06-07 NOTE — TELEPHONE ENCOUNTER
I try to phone Dr. Cordon back today about this patient but this was his administrative day and we discussed with the person taking a call that Dr. Bhatti his PCP will be back in action next week.  When it would be okay for Dr. Cordon to phone Dr. Bhatti or vice versa.

## 2021-06-07 NOTE — TELEPHONE ENCOUNTER
I called the patient today regarding his prostate cancer therapy.  The pros and cons of different options as also been reviewed with the patient.  The patient elected proceed with radiation therapy as his treatment choice for his prostate cancer.  We therefore will schedule patient to have fiducial markers placement and simulation in the future.

## 2021-06-07 NOTE — TELEPHONE ENCOUNTER
Called placed to Dr. Arnie Valdivia's  at [facility] to see when they will be starting fiducial placements again.    No answer, so  was lft with c/b number [phone] and pt info to let me know when they expect the 's to be doing fiducial placements again.

## 2021-06-07 NOTE — PROGRESS NOTES
"Manas Martinez is a 60 y.o. male who is being evaluated via a billable telephone visit for metastatic prostate cancer.    The patient has been notified of following:     \"This telephone visit will be conducted via a call between you and your physician/provider. We have found that certain health care needs can be provided without the need for a physical exam.  This service lets us provide the care you need with a short phone conversation.  If a prescription is necessary we can send it directly to your pharmacy.  If lab work is needed we can place an order for that and you can then stop by our lab to have the test done at a later time.    Telephone visits are billed at different rates depending on your insurance coverage. During this emergency period, for some insurers they may be billed the same as an in-person visit.  Please reach out to your insurance provider with any questions.    If during the course of the call the physician/provider feels a telephone visit is not appropriate, you will not be charged for this service.\"    Phone call duration: 10 minutes    Lindy Kingsley RN      "

## 2021-06-07 NOTE — TELEPHONE ENCOUNTER
Provider Communication      Who is calling:  Dr. Miguel Cordon    Facility in which provider is associated:  [facility]    Reason for call:  Wants to discuss this patients case.    Urgency for return call:  within 2 hours     Okay to leave detailed message?:  Yes

## 2021-06-07 NOTE — PROGRESS NOTES
Manas came to clinic this afternoon for labs and his next dose of Eligard.  VS taken and he is hypertensive.  Pt assessed and is feeling well today.  BP rechecked and still hypertensive.  Dr Cordon made aware and he stated that he will call patients primary MD regarding this.  Ok given to proceed with Eligard.  Eligard was reconstituted per manufacturers instructions and given sq as ordered.  He tolerated injection well and site was covered with a bandaid. Initially patient reported that he was up to date on his medication but on closer questioning patient reports he is probably 4 days late on changing his clonidine patch.  Per Dr Cordon he can be discharged but is to go home and change patch right away.  Patient was educated and agreeable to this plan.  Manas d/c from clinic ambulatory and unaccompanied.

## 2021-06-07 NOTE — TELEPHONE ENCOUNTER
Orders along with Office notes were faxed to Dr. Arnie Valdivia's  for Fiducial placement ordered by Dr. Keller.    18 pages were faxed successfully to [phone].

## 2021-06-07 NOTE — TELEPHONE ENCOUNTER
Refill Approved    Rx renewed per Medication Renewal Policy. Medication was last renewed on 9/27/19.    Lisa Meza, [facility] Triage/Med Refill 4/23/2020     Requested Prescriptions   Pending Prescriptions Disp Refills     chlorthalidone (HYGROTEN) 25 MG tablet [Pharmacy Med Name: CHLORTHALIDONE 25MG TABLETS] 45 tablet 3     Sig: TAKE 1 TABLET(25 MG) BY MOUTH DAILY       Diuretics/Combination Diuretics Refill Protocol  Passed - 4/22/2020  5:22 PM        Passed - Visit with PCP or prescribing provider visit in past 12 months     Last office visit with prescriber/PCP: 9/27/2019 Waldo Kerr MD OR same dept: 9/27/2019 Waldo Kerr MD OR same specialty: 9/27/2019 Waldo Kerr MD  Last physical: 11/21/2019 Last MTM visit: Visit date not found   Next visit within 3 mo: Visit date not found  Next physical within 3 mo: Visit date not found  Prescriber OR PCP: Waldo Kerr MD  Last diagnosis associated with med order: 1. Hypertension  - chlorthalidone (HYGROTEN) 25 MG tablet [Pharmacy Med Name: CHLORTHALIDONE 25MG TABLETS]; TAKE 1 TABLET(25 MG) BY MOUTH DAILY  Dispense: 45 tablet; Refill: 3  - spironolactone (ALDACTONE) 25 MG tablet [Pharmacy Med Name: SPIRONOLACTONE 25MG TABLETS]; TAKE 1 TABLET(25 MG) BY MOUTH DAILY  Dispense: 45 tablet; Refill: 3    If protocol passes may refill for 12 months if within 3 months of last provider visit (or a total of 15 months).             Passed - Serum Potassium in past 12 months      Lab Results   Component Value Date    Potassium 3.0 (L) 03/11/2020             Passed - Serum Sodium in past 12 months      Lab Results   Component Value Date    Sodium 140 03/11/2020             Passed - Blood pressure on file in past 12 months     BP Readings from Last 1 Encounters:   04/14/20 (!) 211/110             Passed - Serum Creatinine in past 12 months      Creatinine   Date Value Ref Range Status   03/11/2020 0.96 0.70 - 1.30 mg/dL Final                 spironolactone (ALDACTONE) 25 MG tablet [Pharmacy Med Name: SPIRONOLACTONE 25MG TABLETS] 45 tablet 3     Sig: TAKE 1 TABLET(25 MG) BY MOUTH DAILY       Diuretics/Combination Diuretics Refill Protocol  Passed - 4/22/2020  5:22 PM        Passed - Visit with PCP or prescribing provider visit in past 12 months     Last office visit with prescriber/PCP: 9/27/2019 Waldo Kerr MD OR same dept: 9/27/2019 Waldo Kerr MD OR same specialty: 9/27/2019 Waldo Kerr MD  Last physical: 11/21/2019 Last MTM visit: Visit date not found   Next visit within 3 mo: Visit date not found  Next physical within 3 mo: Visit date not found  Prescriber OR PCP: Waldo Kerr MD  Last diagnosis associated with med order: 1. Hypertension  - chlorthalidone (HYGROTEN) 25 MG tablet [Pharmacy Med Name: CHLORTHALIDONE 25MG TABLETS]; TAKE 1 TABLET(25 MG) BY MOUTH DAILY  Dispense: 45 tablet; Refill: 3  - spironolactone (ALDACTONE) 25 MG tablet [Pharmacy Med Name: SPIRONOLACTONE 25MG TABLETS]; TAKE 1 TABLET(25 MG) BY MOUTH DAILY  Dispense: 45 tablet; Refill: 3    If protocol passes may refill for 12 months if within 3 months of last provider visit (or a total of 15 months).             Passed - Serum Potassium in past 12 months      Lab Results   Component Value Date    Potassium 3.0 (L) 03/11/2020             Passed - Serum Sodium in past 12 months      Lab Results   Component Value Date    Sodium 140 03/11/2020             Passed - Blood pressure on file in past 12 months     BP Readings from Last 1 Encounters:   04/14/20 (!) 211/110             Passed - Serum Creatinine in past 12 months      Creatinine   Date Value Ref Range Status   03/11/2020 0.96 0.70 - 1.30 mg/dL Final

## 2021-06-07 NOTE — PROGRESS NOTES
"Manas Martinez is a 60 y.o. male who is being evaluated via a billable telephone visit.      The patient has been notified of following:     \"This telephone visit will be conducted via a call between you and your physician/provider. We have found that certain health care needs can be provided without the need for an in-person physical exam.  This service lets us provide the care you need with a video conversation.  If a prescription is necessary we can send it directly to your pharmacy.  If lab work is needed we can place an order for that and you can then stop by our lab to have the test done at a later time.    Telephone visits are billed at different rates depending on your insurance coverage. Please reach out to your insurance provider with any questions.    If during the course of the call the physician/provider feels a video visit is not appropriate, you will not be charged for this service.\"    Patient has given verbal consent to a Video visit? Yes    Patient would like to receive their AVS by AVS Preference: Mail a copy.    Patient would like the video invitation sent by: Send to e-mail at: [email or web address]    Will anyone else be joining your video visit? No    Additional provider notes: I am speak with Manas telephonically for follow-up of hypertension.  His oncologist Dr. Cordon reach out to me because of ongoing elevated blood pressure.  He had not been taking clonidine.  He is on quite a bit of antihypertensive medications including lisinopril, doxazosin 3 times a day, chlorthalidone, spironolactone, carvedilol, clonidine patch.  Despite this he tells me his most recent blood pressure readings were in the 150/90s.  When I first met him he had not had a complete evaluation for secondary causes of hypertension.  We checked urine studies which generally were unremarkable but a plasma aldosterone level was 19.1 and his renin activity level was 0.1.  He was on lisinopril at that time but not " spironolactone.  He does have a left adrenal adenoma that measures 3 cm and has had issues with hypokalemia.  His most recent potassium level on the above medications was 3.0.  I had spoken with his nephrologist and we had discussed further evaluation but things have been interrupted a bit because of a diagnosis of metastatic prostate cancer.  He is now under treatment for this and is interested in pursuing further evaluation for the possibility of aldosterone secreting adenoma.  Not mentioned above he has a benign appearing left adrenal adenoma that is 3 cm in size.    I discussed with him that I have reached the extent of my ability to evaluate this competently.  I think before a surgical evaluation he should have evaluation by an endocrinologist.  I am not sure if he is in need of further lab testing, urine testing, adrenal vein sampling, could try some suppressive medication or just needs to have the adrenal gland removed.  We discussed endocrinology within our system, seen Dr. Deisy Garcia in [facility] or [facility] and at this time he would like to consider [facility] depending on insurance coverage.    He will continue his same medications for now.    I have put in for repeat of the aldosterone and plasma renin activity.  I am slightly hesitant to do this because he is now on spironolactone and this could affect the ratio or the ring and activity level.  With standing, if the renin activity level is suppressed this would be further suggestion of hyperaldosteronism.    Telephone-Visit Details    Type of service:  Video Visit    Total time telephone visit was 22 min  Originating Location (pt. Location): Home    Distant Location (provider location):  [facility]     Mode of Communication:  Video Conference via [employer]      Waldo Kerr MD

## 2021-06-08 NOTE — TELEPHONE ENCOUNTER
Per Lyudmila Jama CNP, patient should keep his appt for lab, MD and injection for July but we will cancel the PET scan and do that at a later date.  I asked that he call back to get his appt for 7/15/20 rescheduled to the week of 7/20/20 per his request as he will still be at [facility] on 7/15/20. I also wanted to let him know that we are re-securing his lupron injection so there should be no issues with thsi.  He was very appreciative.  He does plan to call back to reschedule those appts as he is outside and does not have anything to write those changes down on.      Shea Mcgarry RN

## 2021-06-08 NOTE — TELEPHONE ENCOUNTER
Patient calls back in today.  He states that he has been at the [facility] and they are telling him that he cannot have an operation.  He is going to be starting radiation therapy on Monday 6/8 and this will be completed on July 14 as long as there are no delays.  I let him know that I do need to touch base with Lyudmila Jama CNP as I am not sure that we want to do a PET scan that close to completing radiation.  He also states that he knows he is due for his Lupron injection in July but has paperwork at home that states his insurance is only covering it through July 5, 2020.  I let him know I would check on the PET scan as well as approval for the Lupron injection and get back to him.  He verbalized understanding.    Shea Mcgarry RN

## 2021-06-08 NOTE — TELEPHONE ENCOUNTER
Patient was in today for a 6 week lab check.  He is currently taking apalutamide 240mg daily.  We were watching his lipids after starting back on his statin.  Per Lyudmila Jama CNP, lipid panel is a bit better.  He is to continue on his statin as well as on his current dose of oral apalutamide.  We will recheck these labs at his visit in July.  She would like him to get labs and his PET scan 2 days before a visit with Dr Cordon.  Currently his PET scan is scheduled for the same day as his MD appt.     When I called the patient, he did not answer so I left a brief message asking him to call us back.  I do see that he does My Chart so I will send a message in there as well.      Shea Mcgarry RN

## 2021-06-09 NOTE — PROGRESS NOTES
Pt ambulates to infusion center following MD visit for injection.  Eligard injection given SQ as ordered.  Pt left clinic stable to eliazar.  Plan RTC as scheduled.

## 2021-06-09 NOTE — TELEPHONE ENCOUNTER
Refill Approved    Rx renewed per Medication Renewal Policy. Medication was last renewed on 3/23/20, last OV 4/22/20            .    Jess Lua, [facility] Triage/Med Refill 6/21/2020     Requested Prescriptions   Pending Prescriptions Disp Refills     lisinopriL (PRINIVIL,ZESTRIL) 40 MG tablet [Pharmacy Med Name: LISINOPRIL 40MG TABLETS] 90 tablet 0     Sig: TAKE 1 TABLET(40 MG) BY MOUTH DAILY       Ace Inhibitors Refill Protocol Passed - 6/20/2020 10:38 AM        Passed - PCP or prescribing provider visit in past 12 months       Last office visit with prescriber/PCP: 6/3/2019 Pratik Rico DO OR same dept: Visit date not found OR same specialty: 6/3/2019 Pratik Rico DO  Last physical: 9/10/2018 Last MTM visit: Visit date not found   Next visit within 3 mo: Visit date not found  Next physical within 3 mo: Visit date not found  Prescriber OR PCP: Pratik Rico DO  Last diagnosis associated with med order: 1. Essential hypertension  - lisinopriL (PRINIVIL,ZESTRIL) 40 MG tablet [Pharmacy Med Name: LISINOPRIL 40MG TABLETS]; TAKE 1 TABLET(40 MG) BY MOUTH DAILY  Dispense: 90 tablet; Refill: 0    If protocol passes may refill for 12 months if within 3 months of last provider visit (or a total of 15 months).             Passed - Serum Potassium in past 12 months     Lab Results   Component Value Date    Potassium 3.7 06/02/2020             Passed - Blood pressure filed in past 12 months     BP Readings from Last 1 Encounters:   04/14/20 (!) 211/110             Passed - Serum Creatinine in past 12 months     Creatinine   Date Value Ref Range Status   06/02/2020 1.02 0.70 - 1.30 mg/dL Final

## 2021-06-09 NOTE — PROGRESS NOTES
[facility] Hematology and Oncology Progress Note    Patient: Manas Martinez  MRN: [id number]  Date of Service: 07/15/2020      Assessment and Plan:    Cancer Staging  Prostate cancer metastatic to intrapelvic lymph node (H)  Staging form: Prostate, AJCC 8th Edition  - Clinical stage from 1/6/2020: Stage IVB (cT2c, cN1, pM1a, PSA: 5, Grade Group: 5) - Signed by Miguel Cordon MD on 1/6/2020    1.  Prostate cancer: Clinically he appears to be responding to his treatment.  Just finished radiation yesterday.  We will repeat a Axumin PET/CT in about 4 months.  PSA was not above normal at diagnosis.  We are going to hold his apalutamide for a few months to see if this helps his blood pressure.  We will place referral to genetics.  He will get a Eligard injection today.  He prefers switching to a 6-month depot.    2.  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  He is being evaluated by the [facility] for this problem.    3.  Left renal mass/cyst: Described as an exophytic cyst from CT from November 2019.  Follow with imaging.  Further evaluation if it increases in size.    ECOG Performance   ECOG Performance Status: 0    Distress Assessment  Distress Assessment Score: 1    Pain  Currently in Pain: Yes  Pain Score (Initial OR Reassessment): 3  Location: L4 back area    Diagnosis:    1.  Metastatic prostate cancer: Biopsy of a retroperitoneal node in December 2019 showed adenocarcinoma.  CT scan showed adenopathy infolding the aortic bifurcation and mild pelvic adenopathy along the internal iliac chains bilaterally.  Bone scan at diagnosis was negative.  PET scan showed avid bilateral external iliac, left common iliac, aortocaval, and left retroperitoneal lymphadenopathy.  The most cephalad note is a 15 x 12 mm aortocaval node immediately anterior to L3.  Prostate MRI shows cancer involving almost the entire gland with broad capsular blurring without obvious evidence of extracapsular  extension.  Prostate biopsy shows [place] grade 5+4 = 9, the majority appearing to be 4+4 = 8.    Treatment:    Initially given Firmagon on December 11, 2019.    He received radiation to the prostate and pelvic nodes at the [facility].  Definitive radiotherapy to 70.2 Gy in 26 fractions of IMRT to the prostate, as well as treatment of the pelvic and para-aortic lymph nodes.  Treatment dates were June 8 through July 14, 2020.    Eligard q3 months and apalutamide started in January 2020.    Interim History:    Manas returns today for a follow-up visit.  He was last seen in our clinic about 3 months ago.  He has been getting Lupron every 3 months.  Having 10-12 hot flashes a day.  Some also during the night.  The last about 5 minutes.  Denies any depressed mood or sweats.  No new areas of pain.    Review of Systems:    Constitutional  Constitutional (WDL): All constitutional elements are within defined limits  Neurosensory  Neurosensory (WDL): All neurosensory elements are within defined limits  Cardiovascular  Cardiovascular (WDL): Exceptions to WDL  Edema: Yes  Edema Limbs: 5 - 10% inter-limb discrepancy in volume or circumference at point of greatest visible difference, swelling or obscuration of anatomic architecture on close inspection  Pulmonary  Respiratory (WDL): Within Defined Limits  Gastrointestinal  Gastrointestinal (WDL): Exceptions to WDL  Diarrhea: Increase of <4 stools per day over baseline, mild increase in ostomy output compared to baseline(occ)  Genitourinary  Genitourinary (WDL): Exceptions to WDL(r/t radiation)  Urinary Frequency: Present  Integumentary  Integumentary (WDL): Exceptions to WDL(small bumps on neck neck and inner arms)  Patient Coping  Patient Coping: Accepting  Accompanied by  Accompanied by: Alone    Past History:    Past Medical History:   Diagnosis Date     Adenocarcinoma of prostate (H)      Allergic rhinitis      Cancer (H)      Coronary artery disease      Deviated septum       Elevated PSA      Hyperlipemia      Hypertension      Joint pain      Myocardial infarction (H) 1999    Non Q wave MI, RCA     Obesity      Other asplenic status      Prediabetes      Sleep apnea      Urinary frequency      Physical Exam:    Recent Vitals 7/15/2020   Height -   Weight 264 lbs   BSA (m2) 2.41 m2   /100   Pulse 69   Temp 98.5   Temp src 1   SpO2 97   Some recent data might be hidden     General: patient appears stated age of 60 y.o.. Nontoxic and in no distress.   HEENT: Head: atraumatic, normocephalic. Sclerae anicteric.  Chest:  Normal respiratory effort  Cardiac: +1 bipedal edema.  Abdomen: abdomen is non-distended  Extremities: normal tone and muscle bulk.  Skin: no lesions or rash. Warm and dry.   CNS: alert and oriented. Grossly non-focal.   Psychiatric: normal mood and affect.     Lab Results:    Recent Results (from the past 168 hour(s))   Comprehensive Metabolic Panel   Result Value Ref Range    Sodium 140 136 - 145 mmol/L    Potassium 3.9 3.5 - 5.0 mmol/L    Chloride 106 98 - 107 mmol/L    CO2 29 22 - 31 mmol/L    Anion Gap, Calculation 5 5 - 18 mmol/L    Glucose 94 70 - 125 mg/dL    BUN 17 8 - 22 mg/dL    Creatinine 0.89 0.70 - 1.30 mg/dL    GFR MDRD Af Amer >60 >60 mL/min/1.73m2    GFR MDRD Non Af Amer >60 >60 mL/min/1.73m2    Bilirubin, Total 0.2 0.0 - 1.0 mg/dL    Calcium 8.8 8.5 - 10.5 mg/dL    Protein, Total 6.7 6.0 - 8.0 g/dL    Albumin 3.5 3.5 - 5.0 g/dL    Alkaline Phosphatase 59 45 - 120 U/L    AST 13 0 - 40 U/L    ALT 16 0 - 45 U/L   HM1 (CBC with Diff)   Result Value Ref Range    WBC 4.4 4.0 - 11.0 thou/uL    RBC 3.78 (L) 4.40 - 6.20 mill/uL    Hemoglobin 11.8 (L) 14.0 - 18.0 g/dL    Hematocrit 35.4 (L) 40.0 - 54.0 %    MCV 94 80 - 100 fL    MCH 31.2 27.0 - 34.0 pg    MCHC 33.3 32.0 - 36.0 g/dL    RDW 13.9 11.0 - 14.5 %    Platelets 305 140 - 440 thou/uL    MPV 10.6 8.5 - 12.5 fL    Neutrophils % 64 50 - 70 %    Lymphocytes % 9 (L) 20 - 40 %    Monocytes % 18 (H) 2 - 10 %     Eosinophils % 9 (H) 0 - 6 %    Basophils % 1 0 - 2 %    Neutrophils Absolute 2.8 2.0 - 7.7 thou/uL    Lymphocytes Absolute 0.4 (L) 0.8 - 4.4 thou/uL    Monocytes Absolute 0.8 0.0 - 0.9 thou/uL    Eosinophils Absolute 0.4 0.0 - 0.4 thou/uL    Basophils Absolute 0.1 0.0 - 0.2 thou/uL     Imaging:    No results found.      Signed by: Miguel Cordon MD

## 2021-06-10 NOTE — PROGRESS NOTES
"  Office Visit - Follow Up   Manas Martinez   60 y.o. male    Date of Visit: 7/29/2020    Chief Complaint   Patient presents with     Poison Ivy        Assessment and Plan   1. Contact dermatitis due to poison ivy  Mhasa precautions with this cream  - clobetasoL (TEMOVATE) 0.05 % cream; Apply topically 2 (two) times a day.  Dispense: 45 g; Refill: 0    2. Secondary hypertension  Continue management per [facility]  - Potassium    3. Aldosterone excess (H)  As above, considering adrenal vein sampling and possible surgery  - Potassium    4. Prostate cancer metastatic to intrapelvic lymph node (H)  Per [facility] doing well    Return in about 3 months (around 10/29/2020) for recheck.     History of Present Illness   This 60 y.o. old comes in for follow-up.  Dealing with prostate cancer, doing well with treatment.  Also seen [facility] for aldosterone excess and secondary hypertension.  Considering adrenal vein sampling and surgery.  Now has poison ivy.  Has been using triamcinolone without benefit    Review of Systems: A comprehensive review of systems was negative except as noted.     Medications, Allergies and Problem List   Reviewed, reconciled and updated  Post Discharge Medication Reconciliation Status:      Physical Exam   General Appearance:   No acute distress    /86 (Patient Site: Right Arm, Patient Position: Sitting, Cuff Size: Adult Regular)   Pulse 67   Ht 5' 8.5\" (1.74 m)   Wt (!) 263 lb (119.3 kg)   SpO2 99%   BMI 39.41 kg/m      Contact dermatitis left arm and both legs    HEENT exam is unremarkable  Neck supple no thyromegaly or nodule palpable  Lymphatic no cervical lymphadenopathy  Cardiovascular regular rate and rhythm no murmur gallop or rub  Pulmonary lungs are clear to auscultation bilaterally  Gastrointestinal abdomen soft nontender nondistended no organomegaly  Neurologic exam is non focal  Psychiatric pleasant, no confusion or agitation        Additional Information   Current " Outpatient Medications   Medication Sig Dispense Refill     aspirin 81 MG EC tablet Take 81 mg by mouth daily. Delayed release.       carvedilol (COREG) 12.5 MG tablet Take 1 tablet (12.5 mg total) by mouth 2 (two) times a day with meals. 180 tablet 3     chlorthalidone (HYGROTEN) 25 MG tablet TAKE 1 TABLET(25 MG) BY MOUTH DAILY 90 tablet 3     cholecalciferol, vitamin D3, 50 mcg (2,000 unit) Tab Take 1 Units by mouth.       cloNIDine (CATAPRES-TTS) 0.3 mg/24 hr Place 1 patch on the skin every 7 days.       doxazosin (CARDURA) 4 MG tablet Take 2 mg by mouth 2 (two) times a day. 8mg in the AM, 6mg in the PM  2     eplerenone (INSPRA) 50 MG tablet Take 400 mg by mouth 2 (two) times a day.        ERLEADA 60 mg Tab TAKE 4 TABLETS (240 MG) BY MOUTH DAILY 120 tablet 5     fluticasone (FLONASE) 50 mcg/actuation nasal spray 2 sprays into each nostril daily. 48 g 3     leuprolide, 6 month, 45 mg syringe Inject 45 mg under the skin every 6 (six) months.       lisinopriL (PRINIVIL,ZESTRIL) 40 MG tablet Take 1 tablet (40 mg total) by mouth daily. 90 tablet 2     simvastatin (ZOCOR) 40 MG tablet TAKE 1 TABLET(40 MG) BY MOUTH AT BEDTIME 90 tablet 0     clobetasoL (TEMOVATE) 0.05 % cream Apply topically 2 (two) times a day. 45 g 0     No current facility-administered medications for this visit.      Allergies   Allergen Reactions     Atorvastatin Other (See Comments)     Leg Cramps     Social History     Tobacco Use     Smoking status: Former Smoker     Packs/day: 1.00     Years: 25.00     Pack years: 25.00     Types: Cigarettes     Last attempt to quit:      Years since quittin.5     Smokeless tobacco: Never Used   Substance Use Topics     Alcohol use: Yes     Alcohol/week: 10.0 standard drinks     Types: 10 Cans of beer per week     Drug use: No       Review and/or order of clinical lab tests:  Review and/or order of radiology tests:  Review and/or order of medicine tests:  Discussion of test results with performing  physician:  Decision to obtain old records and/or obtain history from someone other than the patient:  Review and summarization of old records and/or obtaining history from someone other than the patient and.or discussion of case with another health care provider:  Independent visualization of image, tracing or specimen itself:    Time:      Waldo Kerr MD

## 2021-06-11 NOTE — PROGRESS NOTES
Assessment:     1. Essential hypertension with goal blood pressure less than 140/90  cloNIDine HCl (CATAPRES) 0.2 MG tablet    US Renal Artery With Kidney Bilateral   2. Need for vaccination  Tdap vaccine,  8yo or older,  IM   3. KIERSTEN (obstructive sleep apnea)  Ambulatory referral to Sleep Medicine   4. Actinic keratosis         Return in about 3 weeks (around 8/7/2017) for Hypertension.    Plan:     Hypertension  Increase clonidine to 0.2 mg twice a day  Renal ultrasound to rule out renal artery stenosis  Follow-up in 2-3 weeks for recheck, sooner if warning signs and symptoms as discussed  Start using CPAP, will send back to sleep clinic for further evaluation and help with adjustments and recommendations for use.    The following high BMI interventions were performed this visit: encouragement to exercise and weight monitoring    Subjective:       57 y.o. male presents for evaluation follow-up on blood pressure and cholesterol.  He has been taking his blood pressure medication.  He has not been using his CPAP.  He actually never even when picked up the machine.  He has been feeling very fatigued in the afternoons to the point that he actually has to stop driving some afternoons because he feels like he is falling asleep at the wheel.  Denies any headache or changes in vision.  Denies any changes in urinary habits.  Denies any chest pain, shortness of breath, dyspnea on exertion, or palpitations.  Denies any swelling of lower extremities.    The following portions of the patient's history were reviewed and updated as appropriate: allergies, current medications, past family history, past medical history, past social history, past surgical history and problem list.    Review of Systems  A 12 point comprehensive review of systems was negative except as noted.     History   Smoking Status     Former Smoker     Packs/day: 1.00     Years: 25.00   Smokeless Tobacco     Not on file         Objective:      BP (!) 158/94  "(Patient Site: Left Arm, Patient Position: Sitting, Cuff Size: Adult Large)  Pulse 64  Temp 98.1  F (36.7  C) (Oral)   Resp 12  Ht 5' 8\" (1.727 m)  Wt (!) 264 lb 3.2 oz (119.8 kg)  BMI 40.17 kg/m2  General appearance: alert, appears stated age and cooperative  Head: Normocephalic, without obvious abnormality, atraumatic  Eyes: PERRLA, EOMI, flame hemorrhages noted in bilateral retina region.  Normal cup-to-disc ratio.  Ears: normal TM's and external ear canals both ears  Nose: Nares normal. Septum midline. Mucosa normal. No drainage or sinus tenderness.  Throat: lips, mucosa, and tongue normal; teeth and gums normal  Lungs: clear to auscultation bilaterally  Heart: regular rate and rhythm, S1, S2 normal, no murmur, click, rub or gallop  Extremities: extremities normal, atraumatic, no cyanosis or edema  Pulses: 2+ and symmetric  Neurologic: Alert and oriented X 3, normal strength and tone. Normal symmetric reflexes. Normal coordination and gait       This note has been dictated using voice recognition software. Any grammatical or context distortions are unintentional and inherent to the software  "

## 2021-06-12 NOTE — PROGRESS NOTES
[facility] Hematology and Oncology Progress Note    Patient: Manas Martinez  MRN: [id number]  Date of Service: 10/22/2020        Reason for Visit    Chief Complaint   Patient presents with     HE Cancer       Assessment and Plan  Cancer Staging  Prostate cancer metastatic to intrapelvic lymph node (H)  Staging form: Prostate, AJCC 8th Edition  - Clinical stage from 1/6/2020: Stage IVB (cT2c, cN1, pM1a, PSA: 5, Grade Group: 5) - Signed by Miguel Cordon MD on 1/6/2020    1. Prostate cancer, stage IVb with lymph node mets: pt is currently on Lupron. Was on apalutamide from January 2020 until July 2020. We stopped that due to rising cholesterol and hypertension. After discussion with Dr. Cordon, he would like him to try enzalutamide, which has a lower risk of hypertension. Pt wants to think about it. He is not sure he wants to be on anything right now. We will keep him on lupron, which he is due for in December. We will get PET, axumin at that time. His psa wasn't really elevated at time of diagnosis, so not sure how sensitive of a marker that will be for us. That being said, his psa is currently undetectable. We will continue to monitor. He would like his prostate biopsy sent down to the [facility] for path opinion as well. We will work on that. Pt refusing genetic counselor for now.     2. Hypertension: this is better than when he was here in July. This is being managed by endocrinology at the [place]. That doctor is also working with him for his hyperaldosteronism. Currently on lisinopril, clonidine, cardura, coreg, chlorthalidone, eplerenone.     3. Left renal mass/cyst: looks to be exophytic cyst on CT. Will monitor on scans.     ECOG Performance   ECOG Performance Status: 1     Distress Assessment  Distress Assessment Score: 1    Pain  Currently in Pain: Yes  Pain Score (Initial OR Reassessment): 3  Location: back      Problem List    No diagnosis found.    ______________________________________________________________________________    History of Present Illness    Diagnosis:     1.  Metastatic prostate cancer: Biopsy of a retroperitoneal node in December 2019 showed adenocarcinoma.  CT scan showed adenopathy infolding the aortic bifurcation and mild pelvic adenopathy along the internal iliac chains bilaterally.  Bone scan at diagnosis was negative.  PET scan showed avid bilateral external iliac, left common iliac, aortocaval, and left retroperitoneal lymphadenopathy.  The most cephalad note is a 15 x 12 mm aortocaval node immediately anterior to L3.  Prostate MRI shows cancer involving almost the entire gland with broad capsular blurring without obvious evidence of extracapsular extension.  Prostate biopsy shows [place] grade 5+4 = 9, the majority appearing to be 4+4 = 8.     Treatment:     Initially given Firmagon on December 11, 2019.     He received radiation to the prostate and pelvic nodes at the [facility].  Definitive radiotherapy to 70.2 Gy in 26 fractions of IMRT to the prostate, as well as treatment of the pelvic and para-aortic lymph nodes.  Treatment dates were June 8 through July 14, 2020.     Eligard q3 months and apalutamide started in January 2020.     Interim History:  Pt is here today for follow up. He is doing fine. Only complaint is that he is having some worsening urinary frequency. No pain.     Review of Systems    Oncology Nurse Assessment/CMA Intake: Constitutional  Constitutional (WDL): Exceptions to WDL  Neurosensory  Neurosensory (WDL): Exceptions to WDL  Eye   Eye Disorder (WDL): All eye disorder elements are within defined limits  Ear  Ear Disorder (WDL): All ear disorder elements are within defined limits  Cardiovascular  Cardiovascular (WDL): All cardiovascular elements are within defined limits  Pulmonary  Respiratory (WDL): Exceptions to WDL  Cough: Mild symptoms, nonprescription intervention indicated  Dyspnea: Shortness of  breath with moderate exertion  Gastrointestinal  Gastrointestinal (WDL): All gastrointestinal elements are within defined limits  Genitourinary  Genitourinary (WDL): Exceptions to WDL  Urinary Frequency: Present  Urinary Retention: Urinary, suprapubic or intermittent catheter placement not indicated, able to void with some residual  Lymphatic  Lymph (WDL): All lymph disorder elements are within defined limits  Musculoskeletal and Connective Tissue  Musculoskeletal and Connetive Tissue Disorders (WDL): Exceptions to WDL  Arthralgia: Mild pain  Integumentary  Integumentary (WDL): All integumentary elements are within defined limits  Patient Coping  Patient Coping: Accepting  Distress Assessment  Distress Assessment Score: 1  Accompanied by  Accompanied by: Alone  Oral Chemo Adherence         Past History  Past Medical History:   Diagnosis Date     Adenocarcinoma of prostate (H)      Allergic rhinitis      Cancer (H)      Coronary artery disease      Deviated septum      Elevated PSA      Hyperlipemia      Hypertension      Joint pain      Myocardial infarction (H) 1999    Non Q wave MI, RCA     Obesity      Other asplenic status      Prediabetes      Sleep apnea      Urinary frequency        PHYSICAL EXAM  BP (!) 169/94   Pulse 64   Temp 98.5  F (36.9  C) (Oral)   Wt (!) 267 lb (121.1 kg)   SpO2 96%   BMI 40.01 kg/m      GENERAL: no acute distress. Cooperative in conversation. Here alone due to visitor restrictions. Mask on  RESP: Regular respiratory rate. No expiratory wheezes   MUSCULOSKELETAL: no bilateral leg swelling  NEURO: non focal. Alert and oriented x3.   PSYCH: within normal limits. No depression or anxiety.  SKIN: exposed skin is dry intact.     Lab Results    Recent Results (from the past 168 hour(s))   TSH   Result Value Ref Range    TSH 3.35 0.30 - 5.00 uIU/mL   Lipid Profile   Result Value Ref Range    Triglycerides 116 <=149 mg/dL    Cholesterol 156 <=199 mg/dL    LDL Calculated 94 <=129 mg/dL     HDL Cholesterol 39 (L) >=40 mg/dL    Patient Fasting > 8hrs? Yes    Basic Metabolic Panel   Result Value Ref Range    Sodium 138 136 - 145 mmol/L    Potassium 5.3 (H) 3.5 - 5.0 mmol/L    Chloride 104 98 - 107 mmol/L    CO2 26 22 - 31 mmol/L    Anion Gap, Calculation 8 5 - 18 mmol/L    Glucose 118 70 - 125 mg/dL    Calcium 9.6 8.5 - 10.5 mg/dL    BUN 38 (H) 8 - 22 mg/dL    Creatinine 1.48 (H) 0.70 - 1.30 mg/dL    GFR MDRD Af Amer 58 (L) >60 mL/min/1.73m2    GFR MDRD Non Af Amer 48 (L) >60 mL/min/1.73m2   PSA, Diagnostic (Prostatic-Specific Antigen)   Result Value Ref Range    PSA <0.1 0.0 - 4.5 ng/mL   HM1 (CBC with Diff)   Result Value Ref Range    WBC 6.0 4.0 - 11.0 thou/uL    RBC 3.51 (L) 4.40 - 6.20 mill/uL    Hemoglobin 11.2 (L) 14.0 - 18.0 g/dL    Hematocrit 35.0 (L) 40.0 - 54.0 %     80 - 100 fL    MCH 31.9 27.0 - 34.0 pg    MCHC 32.0 32.0 - 36.0 g/dL    RDW 13.1 11.0 - 14.5 %    Platelets 385 140 - 440 thou/uL    MPV 10.1 8.5 - 12.5 fL    Neutrophils % 70 50 - 70 %    Lymphocytes % 11 (L) 20 - 40 %    Monocytes % 12 (H) 2 - 10 %    Eosinophils % 6 0 - 6 %    Basophils % 1 0 - 2 %    Immature Granulocyte % 1 (H) <=0 %    Neutrophils Absolute 4.2 2.0 - 7.7 thou/uL    Lymphocytes Absolute 0.6 (L) 0.8 - 4.4 thou/uL    Monocytes Absolute 0.7 0.0 - 0.9 thou/uL    Eosinophils Absolute 0.4 0.0 - 0.4 thou/uL    Basophils Absolute 0.1 0.0 - 0.2 thou/uL    Immature Granulocyte Absolute 0.0 <=0.0 thou/uL       Imaging    No results found.      Signed by: Marietta Pearce, BERE

## 2021-06-12 NOTE — PROGRESS NOTES
Office Visit - Physical   Manas Martinez   60 y.o.  male    Date of visit: 11/5/2020  Physician: Waldo Kerr MD     Assessment and Plan   1. Routine general medical examination at a health care facility  This is a 60-year-old man with issues as discussed below    2. Stage 3 chronic kidney disease, unspecified whether stage 3a or 3b CKD  Continue lisinopril for renal protection  - Basic Metabolic Panel    3. Coronary artery disease due to lipid rich plaque  Continue statin and aspirin    4. Arthralgia, unspecified joint  Etiology uncertain, did have a tick exposure earlier this month.  He is on a statin which could be contributing.  Polymyalgia rheumatica consideration.  Medication consideration  - Lyme Antibody Cascade  - CK Total  - Sedimentation Rate  - C-Reactive Protein  - Thyroid Cascade  - Antinuclear Antibody (IQRA) Cascade    5. Hyperaldosteronism (H)  Continue management per [facility], I did not adjust any blood pressure medications today    6. Adrenal adenoma, right  As above    7. Secondary hypertension  As above    8. Prostate cancer metastatic to intrapelvic lymph node (H)  Management per oncology and urology  9. KIERSTEN not on CPAP      10. Obesity (BMI 35.0-39.9) with comorbidity (H)  Weight loss recommended    11. Asplenia after surgical procedure  Vaccines given today    12. Prediabetes  - Glycosylated Hemoglobin A1c    13. Need for hepatitis C screening test  - Hepatitis C Antibody (Anti-HCV)    14. RBBB  - Electrocardiogram Perform and Read    The following high BMI interventions were performed this visit: encouragement to exercise and lifestyle education regarding diet    Return in about 3 months (around 2/5/2021) for recheck.     Chief Complaint   Chief Complaint   Patient presents with     Annual Exam     fasting        Patient Profile   Social History     Social History Narrative    Lives with his wife, Cary.  Retired , [employer].  No biological children.  Three step children.   Four step child  from leukemia.        Past Medical History   Patient Active Problem List   Diagnosis     Allergic Rhinitis     Diffuse Joint Pains (Arthralgias)     Coronary artery disease due to lipid rich plaque     Secondary hypertension     Prediabetes     Asplenia after surgical procedure     KIERSTEN not on CPAP     Adrenal adenoma, right     Obesity (BMI 35.0-39.9) with comorbidity (H)     Benign prostatic hyperplasia with urinary frequency     RBBB     Prostate cancer metastatic to intrapelvic lymph node (H)     Chronic kidney disease, stage 3       Past Surgical History  He has a past surgical history that includes pr removal spleen, total (); Coronary stent placement (); Cardiac catheterization (); [place] tooth extraction; Finger tendon repair (Left); Biceps tendon repair (Left); prostrate biopsy; and CT Retroperitoneal Mass Biopsy (2019).     History of Present Illness   This 60 y.o. old man comes in for annual physical and follow-up of numerous medical problems.  Overall he is doing well.  He has underlying hyperaldosteronism and is managed at [facility].  He is on eplerenone as well as other antihypertensive medications blood pressures have been excellent.  He has underlying metastatic prostate cancer and has undergone radiation treatment for this.  He also was receiving testosterone suppression and this has been recommended ongoing but currently does not want to take this as his testosterone level is quite low.  He has sleep apnea but does not use CPAP.  Chronic kidney disease which has been stable.  He has had a lot of aching in his hips and shoulders.  This is throughout the day.  Worse when getting up and raising his hands overhead.    Review of Systems: A comprehensive review of systems was negative except as noted.     Medications and Allergies   Current Outpatient Medications   Medication Sig Dispense Refill     aspirin 81 MG EC tablet Take 81 mg by mouth daily. Delayed release.        carvediloL (COREG) 12.5 MG tablet Take 1 tablet (12.5 mg total) by mouth 2 (two) times a day with meals. 180 tablet 2     chlorthalidone (HYGROTEN) 25 MG tablet TAKE 1 TABLET(25 MG) BY MOUTH DAILY (Patient taking differently: 1/2 tablet) 90 tablet 3     cholecalciferol, vitamin D3, 50 mcg (2,000 unit) Tab Take 1 Units by mouth.       cloNIDine (CATAPRES-TTS) 0.3 mg/24 hr Place 1 patch on the skin every 7 days.       doxazosin (CARDURA) 4 MG tablet Take 1 mg by mouth 2 (two) times a day. 8mg in the AM, 6mg in the PM  2     eplerenone (INSPRA) 50 MG tablet Take 400 mg by mouth 2 (two) times a day.        leuprolide, 6 month, 45 mg syringe Inject 45 mg under the skin every 6 (six) months.       lisinopriL (PRINIVIL,ZESTRIL) 40 MG tablet Take 1 tablet (40 mg total) by mouth daily. 90 tablet 2     simvastatin (ZOCOR) 40 MG tablet Take 1 tablet (40 mg total) by mouth at bedtime. 90 tablet 2     No current facility-administered medications for this visit.      Allergies   Allergen Reactions     Atorvastatin Other (See Comments)     Leg Cramps        Family and Social History   Family History   Problem Relation Age of Onset     Hypertension Mother      Diabetes Mother      COPD Mother      Hypertension Father      Diabetes Father      Prostate cancer Father      Pancreatic cancer Father      Hypertension Brother      Prostate cancer Brother      Diabetes Paternal Grandmother      Diabetes Paternal Grandfather      Hypertension Brother      Hypertension Brother         Social History     Tobacco Use     Smoking status: Former Smoker     Packs/day: 1.00     Years: 25.00     Pack years: 25.00     Types: Cigarettes     Quit date:      Years since quittin.8     Smokeless tobacco: Never Used   Substance Use Topics     Alcohol use: Yes     Alcohol/week: 10.0 standard drinks     Types: 10 Cans of beer per week     Drug use: No        Physical Exam   General Appearance:   No acute distress    /78 (Patient Site:  "Left Arm, Patient Position: Sitting, Cuff Size: Adult Regular)   Pulse 60   Ht 5' 8.5\" (1.74 m)   Wt (!) 265 lb (120.2 kg)   SpO2 98%   BMI 39.71 kg/m      EYES: Eyelids, conjunctiva, and sclera were normal. Pupils were normal. Cornea, iris, and lens were normal bilaterally.  HEAD, EARS, NOSE, MOUTH, AND THROAT: Head and face were normal. Hearing was normal to voice and the ears were normal to external exam. Nose appearance was normal and there was no discharge. Oropharynx was normal.  NECK: Neck appearance was normal. There were no neck masses and the thyroid was not enlarged.  RESPIRATORY: Breathing pattern was normal and the chest moved symmetrically.  Percussion/auscultatory percussion was normal.  Lung sounds were normal and there were no abnormal sounds.  CARDIOVASCULAR: Heart rate and rhythm were normal.  S1 and S2 were normal and there were no extra sounds or murmurs. Peripheral pulses in arms and legs were normal.  Jugular venous pressure was normal.  There was no peripheral edema.  GASTROINTESTINAL: The abdomen was normal in contour.  Bowel sounds were present.  Percussion detected no organ enlargement or tenderness.  Palpation detected no tenderness, mass, or enlarged organs.   MUSCULOSKELETAL: Skeletal configuration was normal and muscle mass was normal for age. Joint appearance was overall normal.  LYMPHATIC: There were no enlarged nodes.  SKIN/HAIR/NAILS: Skin color was normal.  There were no skin lesions.  Hair and nails were normal.  NEUROLOGIC: The patient was alert and oriented to person, place, time, and circumstance. Speech was normal. Cranial nerves were normal. Motor strength was normal for age. The patient was normally coordinated.  PSYCHIATRIC:  Mood and affect were normal and the patient had normal recent and remote memory. The patient's judgment and insight were normal.       Additional Information        Waldo Kerr MD  Internal Medicine  Contact me at [phone]  "

## 2021-06-12 NOTE — TELEPHONE ENCOUNTER
Please review and advise on final results. Thank you. I will inform patient that a few results are not back yet.    Ritika Freitas, CMA

## 2021-06-12 NOTE — PROGRESS NOTES
Assessment:     1. Essential hypertension with goal blood pressure less than 140/90  cloNIDine (CATAPRES-TTS) 0.2 mg/24 hr   2. Adrenal adenoma, right         Return in about 6 months (around 2/7/2018) for Hypertension, Annual physical.    Plan:       Adrenal adenoma, right  2.2 adrenal adenoma. First noted in 2007, stable. Ultrasound 2017: no change in size    Hypertension  Improved.  Not fully controlled but significantly improved.  Diet and exercise highly encouraged to include bariatric program which patient is considering at this point.  Will change from the oral clonidine over to the patch.  Side effects precautions discussed.  Follow-up in 6 months for recheck, sooner if warning signs and symptoms as discussed.    The following high BMI interventions were performed this visit: encouragement to exercise and weight monitoring    Subjective:       57 y.o. male presents for evaluation follow-up and hypertension.  He was changed over clonidine 0.2 mg and blood pressure significantly improved.  No longer having headaches or change in vision.  He is getting a slight dry mouth but he says drinking water helps that.  On the renal ultrasound it did not show any renal artery stenosis, but it did show a 2.2 cm mass near the right adrenal gland.  This has been investigated previously in 2007 2008 and the images from that timeframe were reviewed through care everywhere and it was noted to be a benign adenoma at that time.  It is not changed in size.  He denies any chest pain, shortness breath, dyspnea exertion, palpitations, nausea vomiting or swelling of lower extremities.    The following portions of the patient's history were reviewed and updated as appropriate: allergies, current medications, past family history, past medical history, past social history, past surgical history and problem list.    Review of Systems  A 12 point comprehensive review of systems was negative except as noted.     History   Smoking Status  "    Former Smoker     Packs/day: 1.00     Years: 25.00   Smokeless Tobacco     Not on file       Objective:      /84  Pulse 60  Temp 97.8  F (36.6  C) (Oral)   Resp 12  Ht 5' 8\" (1.727 m)  Wt (!) 257 lb 14.4 oz (117 kg)  BMI 39.21 kg/m2  General appearance: alert, appears stated age and cooperative  Head: Normocephalic, without obvious abnormality, atraumatic  Lungs: clear to auscultation bilaterally  Heart: regular rate and rhythm, S1, S2 normal, no murmur, click, rub or gallop  Extremities: extremities normal, atraumatic, no cyanosis or edema  Pulses: 2+ and symmetric       This note has been dictated using voice recognition software. Any grammatical or context distortions are unintentional and inherent to the software  "

## 2021-06-12 NOTE — TELEPHONE ENCOUNTER
Discussed with Marietta and she is recommending PET scan in December to be discussed at appt with Dr Cordon on 12/30. Patient is concerned with billing if not done prior to December 15th. I reassured patient that as long as date of service is 12/31 or before it would be covered under this year. Patient requested number for central scheduling be left on his voicemail to call later to reschedule. Number for central scheduling was left for patient per his request [phone]. Ghazal Healy, CMA

## 2021-06-12 NOTE — PATIENT INSTRUCTIONS - HE
Patient Education     Enzalutamide Oral capsule, liquid filled  What is this medicine?  ENZALUTAMIDE blocks the effect of the male hormone called testosterone. This medicine is used for certain types of prostate cancer.  This medicine may be used for other purposes; ask your health care provider or pharmacist if you have questions.  What should I tell my health care provider before I take this medicine?  They need to know if you have any of these conditions:    brain tumor    head injury    history of stroke    seizures    an unusual or allergic reaction to enzalutamide, other medicines, foods, dyes, or preservatives    pregnant or trying to get pregnant    breast-feeding  How should I use this medicine?  Take this medicine by mouth with a glass of water. You can take it with or without food. If it upsets your stomach, take it with food. Follow the directions on the prescription label. Do not cut, crush, or chew this medicine. Take your medicine at regular intervals. Do not take it more often than directed. Do not stop taking except on your doctor's advice.  Talk to your pediatrician regarding the use of this medicine in children. Special care may be needed.  Overdosage: If you think you've taken too much of this medicine contact a poison control center or emergency room at once.  NOTE: This medicine is only for you. Do not share this medicine with others.  What if I miss a dose?  If you miss a dose, take it as soon as you can. If it is almost time for your next dose, take only that dose and skip your missed dose. Do not take double or extra doses.  What may interact with this medicine?  This medicine may interact with the following medications:    alfentanil    bosentan    certain medications for seizures like carbamazepine, phenobarbital, and  phenytoin    cyclosporine    dihydroergotamine    efavirenz    ergotamine    etravirine    fentanyl    gemfibrozil    midazolam    modafinil    nafcillin    omeprazole    pimozide    quinidine    rifabutin    rifampin    rifapentine    Jhonny s Wort    sirolimus    tacrolimus    warfarin  This list may not describe all possible interactions. Give your health care provider a list of all the medicines, herbs, non-prescription drugs, or dietary supplements you use. Also tell them if you smoke, drink alcohol, or use illegal drugs. Some items may interact with your medicine.  What should I watch for while using this medicine?  This medicine should not be used in women. Men should not father a child while taking this medicine and for 3 months after stopping it. There is a potential for serious side effects to an unborn child. Talk to your health care professional or pharmacist for more information.  Due to a risk of seizures with enzalutamide therapy, use caution when engaging in activities that could result in serious harm to yourself or others if you pass out.  What side effects may I notice from receiving this medicine?  Side effects that you should report to your doctor or health care professional as soon as possible:    confusion    falls    loss of memory    seizures  Side effects that usually do not require medical attention (Report these to your doctor or health care professional if they continue or are bothersome.):    blood in the urine    breathing problems    diarrhea    dizziness    headache    joint pain    pain, tingling, numbness in the hands or feet    swelling of the ankles, feet, hands    unusually weak or tired  This list may not describe all possible side effects. Call your doctor for medical advice about side effects. You may report side effects to FDA at 7-588-FDA-2313.  Where should I keep my medicine?  Keep out of the reach of children.  Store between 20 and 25 degrees C (68 and 77 degrees F).  Throw away any unused medicine after the expiration date.  NOTE: This sheet is a summary. It may not cover all possible information. If you have questions about this medicine, talk to your doctor, pharmacist, or health care provider.  NOTE:This sheet is a summary. It may not cover all possible information. If you have questions about this medicine, talk to your doctor, pharmacist, or health care provider. Copyright  2015 Gold Standard

## 2021-06-12 NOTE — TELEPHONE ENCOUNTER
Called [facility] and obtained records from pt's prostate biopsy 11/1/19. Per Marietta OROPEZA, pathology slides need to be sent to [facility] for 2nd opinion.  Pt signed release form which was faxed to [facility] at [phone].

## 2021-06-12 NOTE — TELEPHONE ENCOUNTER
Called [facility] to see if they have received the slides for review from pt's 11/1/19 Prostate Bx done at [facility]. Requested via release form on 10/22/20.  [facility] has not received slides. [facility] called who said to call [phone] to f/u on request. No answer. Message left to return call.

## 2021-06-12 NOTE — PROGRESS NOTES
Pt arrived at [facility] to see Marietta OROPEZA. Pt has Prostate Cancer and is here for f/u.

## 2021-06-12 NOTE — TELEPHONE ENCOUNTER
Called [facility] and spoke with Medical Records. Pt's 11/1/19 Prostate Bx report was sent to [facility] but not the slides. Request was updated and [facility] will send request to their Pathology dept to make sure slides are sent to [facility] for 2nd opinion.

## 2021-06-12 NOTE — TELEPHONE ENCOUNTER
Test Results  Who is calling?:    Patient  Who ordered the test:    Waldo Kerr MD  Type of test: Lab  Date of test:  11/5/2020  Where was the test performed:    [facility]  What are your questions/concerns?:    Patient is requesting results.  Okay to leave a detailed message?:  Yes

## 2021-06-12 NOTE — TELEPHONE ENCOUNTER
----- Message from Ghazal Healy CMA sent at 11/4/2020  3:27 PM CST -----  Regarding: Follow up on enzalutamide  After discussion with Dr. Cordon, he would like him to try enzalutamide, which has a lower risk of hypertension. Pt wants to think about it. He is not sure he wants to be on anything right now. We will keep him on lupron, which he is due for in December. We will get PET, axumin at that time.     He would like his prostate biopsy sent down to the [facility] for path opinion as well. We will work on that. Pt refusing genetic counselor for now.

## 2021-06-12 NOTE — TELEPHONE ENCOUNTER
Refill Approved    Rx renewed per Medication Renewal Policy. Medication was last renewed on 9/18/19.3/23/20.    Lisa Meza, [facility] Triage/Med Refill 10/8/2020     Requested Prescriptions   Pending Prescriptions Disp Refills     carvediloL (COREG) 12.5 MG tablet 180 tablet 3     Sig: Take 1 tablet (12.5 mg total) by mouth 2 (two) times a day with meals.       Beta-Blockers Refill Protocol Passed - 10/7/2020  4:09 PM        Passed - PCP or prescribing provider visit in past 12 months or next 3 months     Last office visit with prescriber/PCP: 7/29/2020 Waldo Kerr MD OR same dept: 7/29/2020 Waldo Kerr MD OR same specialty: 7/29/2020 Waldo Kerr MD  Last physical: 11/21/2019 Last MTM visit: Visit date not found   Next visit within 3 mo: Visit date not found  Next physical within 3 mo: Visit date not found  Prescriber OR PCP: Waldo Kerr MD  Last diagnosis associated with med order: 1. Hypertension  - carvediloL (COREG) 12.5 MG tablet; Take 1 tablet (12.5 mg total) by mouth 2 (two) times a day with meals.  Dispense: 180 tablet; Refill: 3    2. Pure hypercholesterolemia  - simvastatin (ZOCOR) 40 MG tablet  Dispense: 90 tablet; Refill: 0    If protocol passes may refill for 12 months if within 3 months of last provider visit (or a total of 15 months).             Passed - Blood pressure filed in past 12 months     BP Readings from Last 1 Encounters:   07/29/20 144/86                simvastatin (ZOCOR) 40 MG tablet 90 tablet 0       Statins Refill Protocol (Hmg CoA Reductase Inhibitors) Passed - 10/7/2020  4:09 PM        Passed - PCP or prescribing provider visit in past 12 months      Last office visit with prescriber/PCP: 7/29/2020 Waldo Kerr MD OR same dept: 7/29/2020 Waldo Kerr MD OR same specialty: 7/29/2020 Waldo Kerr MD  Last physical: 11/21/2019 Last MTM visit: Visit date not found   Next visit within 3 mo: Visit date not found   Next physical within 3 mo: Visit date not found  Prescriber OR PCP: Waldo Kerr MD  Last diagnosis associated with med order: 1. Hypertension  - carvediloL (COREG) 12.5 MG tablet; Take 1 tablet (12.5 mg total) by mouth 2 (two) times a day with meals.  Dispense: 180 tablet; Refill: 3    2. Pure hypercholesterolemia  - simvastatin (ZOCOR) 40 MG tablet  Dispense: 90 tablet; Refill: 0    If protocol passes may refill for 12 months if within 3 months of last provider visit (or a total of 15 months).

## 2021-06-12 NOTE — TELEPHONE ENCOUNTER
Called patient to check in and see if he had made a decision on trying Enzalutamide. Patient reports that he would like to hold off as his testerone is back in order and would like to hold off on medication until it is lowered again. Patient has follow-up appt with Dr Cordon on 12/30 with PET scan on 11/18. Will check and see if appts need to be adjusted. Patient verbalized appreciation of call. Ghazal Healy, CMA

## 2021-06-13 NOTE — PATIENT INSTRUCTIONS - HE
Discontinue chlorthalidone, eplerenone, lisinopril    Furosemide 40 mg daily for 5 days for hyperkalemia    Doxazosin 2 mg every 8 hours as needed systolic blood pressure greater than 160    Repeat labs later this week or early next week    Drink plenty of fluids    If renal function remains poor, cystoscopy and stenting for possible radiation damage    Lyme titer

## 2021-06-13 NOTE — TELEPHONE ENCOUNTER
----- Message from Shea Mcgarry RN sent at 12/16/2020  2:46 PM CST -----  Regarding: potassium level 6.6  Hello -     Patient was in today for a lab draw prior to his appointment back in our clinic on 12/30 with Dr Cordon, oncologist.  I received a call from the lab with a critical value and a potassium of 6.6.  I appears that his creatinine has increased as well.  I discussed this with Marietta Pearce CNP here in our clinic and she would like this patient to be seen by Dr Kerr or a team member as soon as possible.    Thank you,  [facility] Triage  [phone] - direct line

## 2021-06-13 NOTE — TELEPHONE ENCOUNTER
[facility] of Dr Kerr's partners as Dr Kerr is out of the office until 1/11/21

## 2021-06-13 NOTE — TELEPHONE ENCOUNTER
Patient was in today for a lab check prior to his appointment with Dr. Cordon on 12/30.  The lab called with a critical value which was potassium at 6.6.  I discussed this with Marietta Pearce CNP who states that the patient should be seen by his primary doctor as soon as possible.  A high importance staff message was sent to Dr. Waldo Kerr's office asking them to reach out to set up an appointment to further discuss.  I also called the patient to let him know the findings and the plan.  He states that Dr. Kerr is out of the office until 1/11/2021.  I let him know that I sent this to the care team so somebody should be able to help him make an appointment.  I did ask him that if he does not hear from them today that he give their office a call tomorrow so this can get arranged.  He verbalized understanding and was appreciative.    Shea Mcgarry RN

## 2021-06-13 NOTE — PROGRESS NOTES
Patient would like the video invitation sent by: Other e-mail: [email or web address] ([email or web address])       ASSESSMENT:  1. Hyperkalemia  Unclear why abrupt worsening in renal parameters on stable doses except for intervening radiation therapy.  Discontinue potassium sparing meds and burst with a few days of furosemide  - furosemide (LASIX) 40 MG tablet; Take 1 tablet (40 mg total) by mouth daily for 5 days.  Dispense: 5 tablet; Refill: 0  - Comprehensive Metabolic Panel; Future    2. CKD (chronic kidney disease) stage 4, GFR 15-29 ml/min (H)  Marked worsening.  Discontinue medications with effect on kidneys and if no improvement search for obstruction.  PET scan does not suggest obstruction    3. Hyperaldosteronism (H)  Right adrenal adenoma at play with history of hypokalemia    4. Hypertension  Take doxazosin as needed for rainouts or high blood pressure  - doxazosin (CARDURA) 4 MG tablet; Take 0.5 tablets (2 mg total) by mouth every 8 (eight) hours as needed (SBP over 160).; Refill: 2    5. Prostate cancer metastatic to intrapelvic lymph node (H)  Marked improvement by PET scan    6. Adrenal adenoma, right  Remains on PET scan    7. Lyme disease  Status post course of doxycycline  - Lyme Antibody [place]; Future    Preventive Health Care:      PLAN:  Patient Instructions   Discontinue chlorthalidone, eplerenone, lisinopril    Furosemide 40 mg daily for 5 days for hyperkalemia    Doxazosin 2 mg every 8 hours as needed systolic blood pressure greater than 160    Repeat labs later this week or early next week    Drink plenty of fluids    If renal function remains poor, cystoscopy and stenting for possible radiation damage    Lyme titer    Orders Placed This Encounter   Procedures     Comprehensive Metabolic Panel     Standing Status:   Future     Standing Expiration Date:   12/21/2021     Lyme Antibody C[place]     Standing Status:   Future     Standing Expiration Date:   12/21/2021     No follow-ups on file.      CHIEF COMPLAINT:  Chief  Complaint   Patient presents with     Follow-up     High Potassium        HISTORY OF PRESENT ILLNESS:  Manas is a 60 y.o. male contacting the clinic today via video for discussion of hyperkalemia and worsening renal function.  Electrolytes drawn last week showed potassium of 6.  6, BUN of 69, and creatinine of 2.31.  He takes eplerenone, lisinopril, and chlorthalidone.  He says his doses of eplerenone have increased but he has recently decreased them.  PET scan done last week showed improvement in his metastatic prostate cancer and no evidence of renal obstruction.  He did have radiation therapy over the last several months.    He feels well.  He has no peripheral edema.  Blood pressure has been stable.  Weight and appetite are stable.  He had no palpitations    He took doxycycline for Lyme disease and wishes this rechecked    REVIEW OF SYSTEMS:   No peripheral edema.  No headaches.  Cold hands and cold feet.  No palpitations all other systems are negative.    PFSH:  Social History     Social History Narrative    Lives with his wife, Cary.  Retired , [employer].  No biological children.  Three step children.  Four step child  from leukemia.     Used to drink several beers per day but has cut down    TOBACCO USE:  Social History     Tobacco Use   Smoking Status Former Smoker     Packs/day: 1.00     Years: 25.00     Pack years: 25.00     Types: Cigarettes     Quit date:      Years since quittin.9   Smokeless Tobacco Never Used       VITALS:  There were no vitals filed for this visit.  Wt Readings from Last 3 Encounters:   20 (!) 267 lb (121.1 kg)   20 (!) 265 lb (120.2 kg)   10/22/20 (!) 267 lb (121.1 kg)       PHYSICAL EXAM:  (observations via Video)  Garrulous pleasant man.  Sometimes tangential      ADDITIONAL HISTORY SUMMARIZED (2): Reviewed previous notes over the last year showing spironolactone changed to eplerenone when her goal  CARE EVERYWHERE/ EXTRA INFORMATION (1):   RADIOLOGY  "TESTS (1): PET scan last week normal without obvious obstruction  LABS (1): Week reviewed  CARDIOLOGY/MEDICINE TESTS (1):   INDEPENDENT REVIEW (2 each):     Total data points: 4    Video Start time: 1:00 PM  Video End time: 1:32 PM  Very poor [place] connection after 5 minutes converted to phone call    The visit lasted a total of 32 minutes     CA Intake Time: 4 min     I have reviewed and updated the patient's Past Medical History, Social History, Family History as appropriate.    MEDICATIONS: Reviewed and updated per CA and MD  Current Outpatient Medications   Medication Sig Dispense Refill     aspirin 81 MG EC tablet Take 81 mg by mouth daily. Delayed release.       carvediloL (COREG) 12.5 MG tablet Take 1 tablet (12.5 mg total) by mouth 2 (two) times a day with meals. 180 tablet 2     cholecalciferol, vitamin D3, 50 mcg (2,000 unit) Tab Take 1 Units by mouth.       cloNIDine (CATAPRES-TTS) 0.3 mg/24 hr Place 1 patch on the skin every 7 days.       doxazosin (CARDURA) 4 MG tablet Take 0.5 tablets (2 mg total) by mouth every 8 (eight) hours as needed (SBP over 160).  2     furosemide (LASIX) 40 MG tablet Take 1 tablet (40 mg total) by mouth daily for 5 days. 5 tablet 0     leuprolide, 6 month, 45 mg syringe Inject 45 mg under the skin every 6 (six) months.       simvastatin (ZOCOR) 40 MG tablet Take 1 tablet (40 mg total) by mouth at bedtime. 90 tablet 2     No current facility-administered medications for this visit.          The patient has been notified of following:     \"This video visit will be conducted via a call between you and your physician/provider. We have found that certain health care needs can be provided without the need for an in-person physical exam.  This service lets us provide the care you need with a video conversation.  If a prescription is necessary we can send it directly to your pharmacy.  If lab work is needed we can place an order for that and you can then stop by our lab to have the " "test done at a later time.    Video visits are billed at different rates depending on your insurance coverage. Please reach out to your insurance provider with any questions.    If during the course of the call the physician/provider feels a video visit is not appropriate, you will not be charged for this service.\"    Patient has given verbal consent to a Video visit? Yes  How would you like to obtain your AVS? AVS Preference: MyChart.  If dropped by the video visit, the video invitation should be sent to: Text to cell phone: [phone]  Will anyone else be joining your video visit? No     Video-Visit Details    Type of service:  Video Visit    Originating Location (pt. Location): Home    Distant Location (provider location):   [facility]     Platform used for Video Visit: Nathaniel Norris MD    "

## 2021-06-13 NOTE — TELEPHONE ENCOUNTER
Please let him know that Dr. Kerr is away until January.  The only thing I would add is a recheck of his kidney and electrolytes if that wasn't included in his scheduled labs.  I'm not able to see what is scheduled for him.  He was treated appropriately for Lyme's.  Thanks.

## 2021-06-14 NOTE — TELEPHONE ENCOUNTER
When did you see your provider?: 12/21/20    What is your question?: Patient took his first dose of furosemide yesterday morning and has developed red, raised tiny bumps on both elbows.  The bumps are a little itchy.  Bumps were only on right elbow yesterday.  Today both elbows are effective.  Could this be from the furosemide?      Okay to leave a detailed message: Yes

## 2021-06-14 NOTE — TELEPHONE ENCOUNTER
It could but Iit looks like he really needs it to reduce potassium . I would recommend taking another dose today . Rechecking labs tomorrow . To make sure potassium is at a safer level . If it is less than 5.5 his potassium then he can stop his potassium otherwise if it is still at the levels it was before then he does need to go to the hospital because he will need IV fluids , urgent kidney specialist consult to lower potassium and monitor his labs and kidney test . I will order labs .

## 2021-06-14 NOTE — PATIENT INSTRUCTIONS - HE
1. An MRI was ordered for you today.  You will be contacted by scheduling within 3 days.    If you are not contacted, please call Radiology at [phone].      2. Follow up after MRI, in person

## 2021-06-14 NOTE — PROGRESS NOTES
Assessment/Plan:      Diagnoses and all orders for this visit:    Lumbar spine pain  -     MR Lumbar Spine Without Contrast; Future; Expected date: 01/20/2021    Lumbar radicular pain  -     MR Lumbar Spine Without Contrast; Future; Expected date: 01/20/2021    Spinal stenosis of lumbar region without neurogenic claudication  -     MR Lumbar Spine Without Contrast; Future; Expected date: 01/20/2021    DDD (degenerative disc disease), lumbar  -     MR Lumbar Spine Without Contrast; Future; Expected date: 01/20/2021    Myofascial pain        Assessment: Pleasant 61 y.o. male with a history of myocardial infarction status post stent placement, hypertension, hyperlipidemia, prostate cancer with:    1.  30-year history of lumbar spine pain progressive with right greater than left lower extremity radicular pain/neurogenic claudication.  He has multilevel degenerative disc disease and facet arthropathy on CT abdomen pelvis from 2019.  He has disc osteophyte complexes with what appears to be significant central stenosis at L1-2 and L2-3 as well as foraminal stenosis right greater than left at L4-5 and L5-S1.    2.  Myofascial pain gluteal region and lateral leg.      Discussion:    1.  We discussed the diagnosis and treatment options.  Discussed the option of imaging along with physical therapy medications.  He is not interested in medications at this time.    2.  He has had no advanced imaging of the lumbar spine other than CT abdomen and pelvis from 1 year ago.  I would recommend MRI of the lumbar spine to further evaluate the severity of the central stenosis and foraminal stenosis of the lumbar spine.  This has been ordered.    3.  Would recommend physical therapy but he would like to see how the MRI looks prior.  He also tells me he is not compliant with physical therapy and it might be cost prohibitive.    4.  Follow-up with me 2 to 3 weeks to review imaging and discuss further recommendations.      It was our pleasure  caring for your patient today, if there any questions or concerns please do not hesitate to contact us.      Subjective:   Patient ID: Manas Martinez is a 61 y.o. male.    History of Present Illness: Patient presents at the request of Dr. Waldo Kerr for an evaluation of lumbar spine pain along with right greater than left lower extremity pain and paresthesias.  Patient reports low back pain for 30 years.  Did have an injury about 30 years ago.  Was lifting the tongue of a boat trailer and felt a pop in his low back.  No surgery was performed but has had some lumbar injections over the years along with some physical therapy.  His symptoms wax and wane but typically have worsened.  His last physical therapy session was in the 1990s.    His pain is worsened over time for the past 1 to 2 years he has significant increase in back pain.  This is at the lumbosacral junction right greater than left.  He has radiation to the gluteal region and right greater than left lower extremity numbness and tingling posterior lateral leg to the knee.  No symptoms distal to the knees.  The low back pain is bothersome at different times in the legs.  The low back is typically worse with standing   and twisting.  Lower extremity paresthesias and pain occur with any prolonged standing or slightly flexed at the waist better with sitting nearly immediately.  The leg symptoms are numbness tingling and weakness down the lateral leg to the knee and occasionally on the left mostly on the right.  Rates his pain a 7/10 at worst 1/10 today 0/10 at best.      Imaging: CT abdomen and pelvis personally reviewed from 2019 as well as MRI from 2019 of the prostate.  CT scan shows multilevel degenerative disc disease with vacuum disc phenomenon L4-5.  There is posterior disc osteophyte complex resulting in at least moderate central stenosis L1-2 and L2-3 he appears to have some epidural lipomatosis as well at those levels.  There is also right greater  than left foraminal stenosis L4-5 and L5-S1.  On MRI there is degenerative disc disease most significant L4-5.  There appears to be facet arthropathy lower lumbar spine as well.  Limited imaging on this prostate MRI.          Review of Systems: Complains of weight gain, sexual dysfunction, swelling of the feet, enlarged lymph nodes, joint pain.  Has had some kidney issues but that is improved with changing his medications.  Denies fevers, headache, change in vision, chest pain, shortness of breath, abdominal pain, nausea, vomiting, bowel or bladder incontinence, skin issues. Remainder of 12 point review systems negative unless listed above.    Past Medical History:   Diagnosis Date     Adenocarcinoma of prostate (H)      Allergic rhinitis      Cancer (H)      Coronary artery disease      Deviated septum      Elevated PSA      Hyperlipemia      Hypertension      Joint pain      Myocardial infarction (H) 1999    Non Q wave MI, RCA     Obesity      Other asplenic status      Prediabetes      Sleep apnea      Urinary frequency        Family History   Problem Relation Age of Onset     Hypertension Mother      Diabetes Mother      COPD Mother      Hypertension Father      Diabetes Father      Prostate cancer Father      Pancreatic cancer Father      Hypertension Brother      Prostate cancer Brother      Diabetes Paternal Grandmother      Diabetes Paternal Grandfather      Hypertension Brother      Hypertension Brother          Social History     Socioeconomic History     Marital status:      Spouse name: Cary     Number of children: 0     Years of education: 16     Highest education level: None   Occupational History     Employer: RETIRED   Social Needs     Financial resource strain: None     Food insecurity     Worry: None     Inability: None     Transportation needs     Medical: None     Non-medical: None   Tobacco Use     Smoking status: Former Smoker     Packs/day: 1.00     Years: 25.00     Pack years: 25.00      Types: Cigarettes     Quit date:      Years since quittin.0     Smokeless tobacco: Never Used   Substance and Sexual Activity     Alcohol use: Yes     Alcohol/week: 10.0 standard drinks     Types: 10 Cans of beer per week     Drug use: No     Sexual activity: Not Currently     Partners: Female     Birth control/protection: Surgical     Comment: Partner = Tubaligation   Lifestyle     Physical activity     Days per week: None     Minutes per session: None     Stress: None   Relationships     Social connections     Talks on phone: None     Gets together: None     Attends Anabaptist service: None     Active member of club or organization: None     Attends meetings of clubs or organizations: None     Relationship status: None     Intimate partner violence     Fear of current or ex partner: None     Emotionally abused: None     Physically abused: None     Forced sexual activity: None   Other Topics Concern     None   Social History Narrative    Lives with his wife, Cary.  Retired [employer].  No biological children.  Three step children.  Four step child  from leukemia.     Social history: .  Retired  for [employer].  No tobacco currently.  Does drink alcohol occasionally.    The following portions of the patient's history were reviewed and updated as appropriate: allergies, current medications, past family history, past medical history, past social history, past surgical history and problem list.      WHO 5: 21    MEE Score: 20  NDI Score: 2      Objective:   Physical Exam:    Vitals:    21 0918   BP: (!) 159/91   Pulse: 62     Body mass index is 40.17 kg/m .      General:  Well-appearing male in no acute distress.  Overweight, pleasant, cooperative, and interactive throughout the examination and interview.  CV: 1+ bilateral lower extremity pitting edema.  Lymphatics: No cervical lymphadenopathy palpated. Eyes: sclera clear. Skin: No rashes or lesions seen over the  head/neck, hairline, arms, legs,  .  Respirations unlabored.  MSK: Gait is nonantalgic, able to heel toe stand without difficulty..    Negative Romberg.  Spine: normal AP curves of the C, T, and L spine.  Mild to moderate decreased lumbar flexion finger to floor testing.  Full extension.  Palpation: Tenderness to palpation over *lumbar paraspinals L4-5 L5-S1.  Mild tenderness over the gluteal tissues and greater trochanters.  Extremities: Full range of motion of the elbows, and wrists with no effusions or tenderness to palpation.   Full range of motion of the hips, knees, and ankles from seated with no effusions or tenderness to palpation.   No hypermobility of the upper or lower extremities.  Neurologic exam: Mental status: Patient is alert and oriented with normal affect.  Attention, knowledge, memory, and language are intact.  Normal coordination throughout the examination.  Reflexes are 1+ and symmetric biceps, triceps, brachioradialis, trace patellar, and 0 Achilles with down-going toes and Negative Alis's.  Sensation is intact to light touch throughout the upper and lower extremities bilaterally.  Manual muscle testing reveals 5 out of 5 in the hip flexors, knee flexors/extensors, ankle plantar flexors, ankle  dorsiflexors, and EHL.  Upper extremities: Grossly normal strength . Normal muscle bulk and tone in the arms and legs.    Negative seated  straight leg raise bilaterally.

## 2021-06-14 NOTE — PROGRESS NOTES
Pt here for eligard injection given L abdomen without incident. First syringe leaked so pharmacy had to bring out another one. Pt aware of treatment plan.

## 2021-06-14 NOTE — PROGRESS NOTES
Office Visit - Follow Up   Manas Martinez   61 y.o. male    Date of Visit: 1/15/2021    Chief Complaint   Patient presents with     Leg Swelling     both     Blood Pressure Check        Assessment and Plan   1. Hypertension  This is a 61-year-old man with chronic kidney disease and secondary hypertension with severe exacerbation of unclear etiology but likely medication related.  His medications were appropriately adjusted by Dr. Ken Norris but he is now hypertensive.  He has been on 400 mg of eplerenone twice daily.  We will cautiously restart this at 50 mg twice daily and have him take chlorthalidone 25 mg daily.  He will hold off on lisinopril.  He will increase carvedilol to 25 mg twice daily.  Continue with clonidine patch.  He is interested in having definitive therapy with adrenalectomy and is going to [facility].  He will have very close follow-up with a basic metabolic panel in 1 week.  We discussed the importance of avoiding nephrotoxic medication.  - eplerenone (INSPRA) 50 MG tablet; Take 1 tablet (50 mg total) by mouth 2 (two) times a day.  Dispense: 180 tablet; Refill: 3  - carvediloL (COREG) 25 MG tablet; Take 1 tablet (25 mg total) by mouth 2 (two) times a day with meals.  Dispense: 180 tablet; Refill: 3  - chlorthalidone (HYGROTEN) 25 MG tablet; Take 1 tablet (25 mg total) by mouth daily.  Dispense: 90 tablet; Refill: 3  - Basic Metabolic Panel  - Basic Metabolic Panel; Future    2. Hyperaldosteronism (H)  - eplerenone (INSPRA) 50 MG tablet; Take 1 tablet (50 mg total) by mouth 2 (two) times a day.  Dispense: 180 tablet; Refill: 3    3. CKD (chronic kidney disease) stage 4, GFR 15-29 ml/min (H)  As above, severe progression of chronic disease, prescribing medication which will require intensive monitoring of his potassium and creatinine    4. Acute kidney injury (H)  As above  5. Hyperkalemia  As above    6. Osteoarthritis of cervical spine, unspecified spinal osteoarthritis  "complication status  Mentioned below he does have degenerative changes of his spine is interested in meeting with spine care.  I recommended our spine clinic  - Ambulatory referral to [facility]    7. Prostate cancer metastatic to intrapelvic lymph node (H)  Continue with current treatment as outlined by oncology and radiation oncology    8. Morbid obesity (H)  The following high BMI interventions were performed this visit: encouragement to exercise and lifestyle education regarding diet    Return in about 1 week (around 1/22/2021) for recheck.     History of Present Illness   This 61 y.o. old man comes in for follow-up.  He has underlying secondary hypertension from an aldosterone secreting adrenal adenoma.  He has been well controlled with eplerenone and other antihypertensive medication.  He is undergoing radiation therapy for prostate cancer.  Routine labs with his radiation he was found to have acute kidney injury and hyperkalemia.  He was seen in consultation with Dr. Ken Norris and medications were adjusted.  His potassium and creatinine were rechecked and have improved but his blood pressure is now elevated and he has quite significant lower extremity edema.    Review of Systems: A comprehensive review of systems was negative except as noted.     Medications, Allergies and Problem List   Reviewed, reconciled and updated  Post Discharge Medication Reconciliation Status:      Physical Exam   General Appearance:   No acute distress    BP (!) 150/92 (Patient Site: Left Arm, Patient Position: Sitting, Cuff Size: Adult Regular)   Pulse 72   Ht 5' 9\" (1.753 m)   Wt (!) 272 lb (123.4 kg)   SpO2 96%   BMI 40.17 kg/m      HEENT exam is unremarkable  Neck supple no thyromegaly or nodule palpable  Lymphatic no cervical lymphadenopathy  Cardiovascular regular rate and rhythm no murmur gallop or rub  Pulmonary lungs are clear to auscultation bilaterally  Gastrointestinal abdomen soft nontender " nondistended no organomegaly  Neurologic exam is non focal  Psychiatric pleasant, no confusion or agitation   Marked lower extremity edema     Additional Information   Current Outpatient Medications   Medication Sig Dispense Refill     carvediloL (COREG) 25 MG tablet Take 1 tablet (25 mg total) by mouth 2 (two) times a day with meals. 180 tablet 3     cloNIDine (CATAPRES-TTS) 0.3 mg/24 hr Place 1 patch on the skin every 7 days.       doxazosin (CARDURA) 4 MG tablet Take 0.5 tablets (2 mg total) by mouth every 8 (eight) hours as needed (SBP over 160).  2     leuprolide, 6 month, 45 mg syringe Inject 45 mg under the skin every 6 (six) months.       simvastatin (ZOCOR) 40 MG tablet Take 1 tablet (40 mg total) by mouth at bedtime. 90 tablet 2     chlorthalidone (HYGROTEN) 25 MG tablet Take 1 tablet (25 mg total) by mouth daily. 90 tablet 3     eplerenone (INSPRA) 50 MG tablet Take 1 tablet (50 mg total) by mouth 2 (two) times a day. 180 tablet 3     No current facility-administered medications for this visit.      Allergies   Allergen Reactions     Atorvastatin Other (See Comments)     Leg Cramps     Social History     Tobacco Use     Smoking status: Former Smoker     Packs/day: 1.00     Years: 25.00     Pack years: 25.00     Types: Cigarettes     Quit date:      Years since quittin.0     Smokeless tobacco: Never Used   Substance Use Topics     Alcohol use: Yes     Alcohol/week: 10.0 standard drinks     Types: 10 Cans of beer per week     Drug use: No       Review and/or order of clinical lab tests:  Review and/or order of radiology tests:  Review and/or order of medicine tests:  Discussion of test results with performing physician:  Decision to obtain old records and/or obtain history from someone other than the patient:  Review and summarization of old records and/or obtaining history from someone other than the patient and.or discussion of case with another health care provider:  Independent visualization of  image, tracing or specimen itself:    Time:      Waldo Kerr MD

## 2021-06-15 NOTE — PROGRESS NOTES
[facility] Hematology and Oncology Progress Note    Patient: Manas Martinez  MRN: [id number]  Date of Service: 12/30/2020      Assessment and Plan:    Cancer Staging  Prostate cancer metastatic to intrapelvic lymph node (H)  Staging form: Prostate, AJCC 8th Edition  - Clinical stage from 1/6/2020: Stage IVB (cT2c, cN1, pM1a, PSA: 5, Grade Group: 5) - Signed by Miguel Cordon MD on 1/6/2020    1.  Prostate cancer: He has now been on Eligard for about a year.  Generally tolerating it well.  He had been on apalutamide for about 6 months and this was stopped in July.  He just had a prostate PET scan.  When compared to his scan from January 2020, the nodes have resolved or uptake and there is improvement in the uptake within the prostate.  Near complete response to therapy.  We will continue on single agent Eligard per the patient's request.  We can add enzalutamide at progression.  Continues to have a good response with an undetectable PSA.    2.  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  He is being followed at [facility] for this problem.    3.  Left renal mass/cyst: Described as an exophytic cyst from CT from November 2019.  Follow with imaging.  Further evaluation if it increases in size.    ECOG Performance   ECOG Performance Status: 0    Distress Assessment  Distress Assessment Score: No distress    Pain  Currently in Pain: No/denies    Diagnosis:    1.  Metastatic prostate cancer: Biopsy of a retroperitoneal node in December 2019 showed adenocarcinoma.  CT scan showed adenopathy infolding the aortic bifurcation and mild pelvic adenopathy along the internal iliac chains bilaterally.  Bone scan at diagnosis was negative.  PET scan showed avid bilateral external iliac, left common iliac, aortocaval, and left retroperitoneal lymphadenopathy.  The most cephalad note is a 15 x 12 mm aortocaval node immediately anterior to L3.  Prostate MRI shows cancer involving almost the entire  gland with broad capsular blurring without obvious evidence of extracapsular extension.  Prostate biopsy shows [place] grade 5+4 = 9, the majority appearing to be 4+4 = 8.  PSA at diagnosis was around 4.0.    Treatment:    Initially given Firmagon on December 11, 2019.    He received radiation to the prostate and pelvic nodes at the [facility].  Definitive radiotherapy to 70.2 Gy in 26 fractions of IMRT to the prostate, as well as treatment of the pelvic and para-aortic lymph nodes.  Treatment dates were June 8 through July 14, 2020.    Eligard q3 months and apalutamide started in January 2020.  We held the apalutamide in July, 2020 given elevated cholesterol and hypertension.    Interim History:    Manas returns today for a follow-up visit.  Continues to do well since his last visit.  Occasional hot flashes.  No sweats or myalgias.  No new areas of pain.    Review of Systems:    Constitutional  Constitutional (WDL): All constitutional elements are within defined limits  Neurosensory  Neurosensory (WDL): All neurosensory elements are within defined limits  Cardiovascular  Cardiovascular (WDL): All cardiovascular elements are within defined limits  Pulmonary  Respiratory (WDL): Within Defined Limits  Gastrointestinal  Gastrointestinal (WDL): All gastrointestinal elements are within defined limits  Genitourinary  Genitourinary (WDL): All genitourinary elements are within defined limits  Integumentary  Integumentary (WDL): All integumentary elements are within defined limits  Patient Coping  Patient Coping: Accepting  Accompanied by  Accompanied by: Alone    Past History:    Past Medical History:   Diagnosis Date     Adenocarcinoma of prostate (H)      Allergic rhinitis      Cancer (H)      Coronary artery disease      Deviated septum      Elevated PSA      Hyperlipemia      Hypertension      Joint pain      Myocardial infarction (H) 1999    Non Q wave MI, RCA     Obesity      Other asplenic status      Prediabetes       Sleep apnea      Urinary frequency      Physical Exam:    Recent Vitals 2/5/2021   Height -   Weight -   BSA (m2) -   /98   Pulse -   Temp -   Temp src -   SpO2 -   Some recent data might be hidden     General: patient appears stated age of 61 y.o.. Nontoxic and in no distress.   HEENT: Head: atraumatic, normocephalic. Sclerae anicteric.  Chest:  Normal respiratory effort  Cardiac: Trace bipedal edema.  Abdomen: abdomen is non-distended  Extremities: normal tone and muscle bulk.  Skin: no lesions or rash. Warm and dry.   CNS: alert and oriented. Grossly non-focal.   Psychiatric: normal mood and affect.     Lab Results:    Results for JADE HUIZAR (MRN [id number]) as of 2/18/2021 16:45   Ref. Range 12/16/2020 12:47   Sodium Latest Ref Range: 136 - 145 mmol/L 136   Potassium Latest Ref Range: 3.5 - 5.0 mmol/L 6.6 (HH)   Chloride Latest Ref Range: 98 - 107 mmol/L 104   CO2 Latest Ref Range: 22 - 31 mmol/L 27   Anion Gap, Calculation Latest Ref Range: 5 - 18 mmol/L 5   BUN Latest Ref Range: 8 - 22 mg/dL 69 (H)   Creatinine Latest Ref Range: 0.70 - 1.30 mg/dL 2.31 (H)   GFR MDRD Af Amer Latest Ref Range: >60 mL/min/1.73m2 35 (L)   GFR MDRD Non Af Amer Latest Ref Range: >60 mL/min/1.73m2 29 (L)   Calcium Latest Ref Range: 8.5 - 10.5 mg/dL 9.8   AST Latest Ref Range: 0 - 40 U/L 14   ALT Latest Ref Range: 0 - 45 U/L 17   ALBUMIN Latest Ref Range: 3.5 - 5.0 g/dL 4.0   Protein, Total Latest Ref Range: 6.0 - 8.0 g/dL 7.8   Alkaline Phosphatase Latest Ref Range: 45 - 120 U/L 67   Bilirubin, Total Latest Ref Range: 0.0 - 1.0 mg/dL 0.4   Glucose Latest Ref Range: 70 - 125 mg/dL 127 (H)   PSA Latest Ref Range: 0.0 - 4.5 ng/mL <0.1   WBC Latest Ref Range: 4.0 - 11.0 thou/uL 5.4   RBC Latest Ref Range: 4.40 - 6.20 mill/uL 3.76 (L)   Hemoglobin Latest Ref Range: 14.0 - 18.0 g/dL 11.8 (L)   Hematocrit Latest Ref Range: 40.0 - 54.0 % 36.6 (L)   MCV Latest Ref Range: 80 - 100 fL 97   MCH Latest Ref Range: 27.0 - 34.0 pg 31.4    MCHC Latest Ref Range: 32.0 - 36.0 g/dL 32.2   RDW Latest Ref Range: 11.0 - 14.5 % 13.2   Platelets Latest Ref Range: 140 - 440 thou/uL 395   MPV Latest Ref Range: 8.5 - 12.5 fL 10.8   Neutrophils % Latest Ref Range: 50 - 70 % 71 (H)   Lymphocytes % Latest Ref Range: 20 - 40 % 10 (L)   Monocytes % Latest Ref Range: 2 - 10 % 10   Eosinophils % Latest Ref Range: 0 - 6 % 7 (H)   Basophils % Latest Ref Range: 0 - 2 % 1   Neutrophils Absolute Latest Ref Range: 2.0 - 7.7 thou/uL 3.8   Lymphocytes Absolute Latest Ref Range: 0.8 - 4.4 thou/uL 0.6 (L)   Monocytes Absolute Latest Ref Range: 0.0 - 0.9 thou/uL 0.5   Eosinophils Absolute Latest Ref Range: 0.0 - 0.4 thou/uL 0.4   Basophils Absolute Latest Ref Range: 0.0 - 0.2 thou/uL 0.1     Imaging:    I personally reviewed his PET/CT results.  These show almost a complete response of the prostate cancer.  No activity in any lymph nodes.    EXAM: NM PET (EYES TO THIGHS) PROSTATE, Fluciclovine (Axumin) PET/CT  LOCATION: [facility]  DATE/TIME: 12/16/2020 2:07 PM     FINDINGS: Resolution of the previously seen radiotracer positive abdominal/pelvic lymph nodes with decreased size and radiotracer uptake within the prostate gland suggesting near-complete response to therapy. The remaining radiotracer distribution is   physiologic.     Mild carotid artery bifurcation calcification. Moderate coronary artery calcium. Right adrenal adenoma. Left renal cyst. Sigmoid diverticulosis. Pelvic phleboliths. Small fat-containing inguinal hernias. Radiotracer positive lesion in the left posterior   iliac bone. Multilevel degenerative changes of the spine.     IMPRESSION:      Near complete response to therapy with decreased but       Signed by: Miguel Cordon MD

## 2021-06-15 NOTE — PATIENT INSTRUCTIONS - HE
1. A physical therapy order was provided for you today.  You can schedule physical therapy before you leave today or will be contacted by physical therapy.  If nobody contacts you within 3 to 5 days, please contact the clinic at [phone].  It will be very important for you to do your physical therapy exercises on a regular basis to decrease your pain and prevent future pain flares.    2. Bariatric eval for weight managment

## 2021-06-15 NOTE — PROGRESS NOTES
Assessment/Plan:      Diagnoses and all orders for this visit:    Lumbar spine pain  -     Ambulatory referral to Physical Therapy    Lumbar radicular pain  -     Ambulatory referral to Physical Therapy    Spinal stenosis of lumbar region without neurogenic claudication  -     Ambulatory referral to Physical Therapy    DDD (degenerative disc disease), lumbar  -     Ambulatory referral to Physical Therapy    Myofascial pain    Epidural lipomatosis  -     Ambulatory referral to Bariatric Care: Surgical and Non-Surgical  -     Ambulatory referral to Physical Therapy    Overweight  -     Ambulatory referral to Bariatric Care: Surgical and Non-Surgical  -     Ambulatory referral to Physical Therapy        Assessment: Pleasant 61 y.o. male  with a history of myocardial infarction status post stent placement, hypertension, hyperlipidemia, prostate cancer with:     1.  30-year history of lumbar spine pain progressive with right greater than left lower extremity radicular pain/neurogenic claudication.  He has multilevel degenerative disc disease anteriorly with facet arthropathy and significant epidural lipomatosis posteriorly throughout the lumbar spine most significant L4-5 and L3-4  resulting in severe central stenosis.  Most significant pain is in the right lower extremity.     2.  Overweight.     3. .  Myofascial pain gluteal region and lateral leg.         Discussion:    1.  We discussed his MRI at length.  We discussed epidural lipomatosis.  We discussed options for lumbar stenosis such as physical therapy, lumbar epidural medications.  He wants to avoid medications.  At this time he is also on antibiotics for Lyme's disease and will need to wait for any steroid injections until after completing treatment and waiting sufficient amount of time to ensure no lingering infection.    2.  Start physical therapy for lumbar strengthening stabilization pelvic tilt and nerve glides.    3.  Given epidural lipomatosis, I  recommend bariatric/weight management referral for nonsurgical evaluation and treatment.  He is not wanting surgery.  Weight management will be likely the most effective way to address the epidural lipomatosis.    4.  We discussed surgical option.  Given the significant epidural lipomatosis, would like to try other nonsurgical options and he agrees.    5.  He is taking a trip to [place] and will contact us to follow-up as needed after returning.      It was our pleasure caring for your patient today, if there any questions or concerns please do not hesitate to contact us.    30 minutes were spent on the date of the encounter performing chart review, patient visit and documentation in addition to any procedure.    Subjective:   Patient ID: Manas Martinez is a 61 y.o. male.    History of Present Illness: Patient presents for follow-up of low back pain and bilateral lower extremity pain.  Follows up after MRI.  Symptoms may have slightly improved recently.  His leg pain is definitely worse in the back when he does any bending.  Walking is difficult.  Standing seems to help along with sitting.  Pain is 7/10 at worst 3/10 today 0/10 at best.  Denies weakness in his legs.  Has had MRI since last visit.      Imaging: MRI report and images were personally reviewed and discussed with the patient.  A plastic model was utilized during the discussion.  MRI of the    Review of Systems: Pertinent positives: Numbness and tingling in the right leg down the lateral leg to the knee.  Pertinent negatives: No  weakness.  No bowel or bladder incontinence.  No urinary retention.  No fevers, unintentional weight loss, balance changes, headaches, frequent falling, difficulty swallowing, or coordination difficulties.  All others reviewed are negative.         Past Medical History:   Diagnosis Date     Adenocarcinoma of prostate (H)      Allergic rhinitis      Cancer (H)      Coronary artery disease      Deviated septum      Elevated PSA       Hyperlipemia      Hypertension      Joint pain      Myocardial infarction (H) 1999    Non Q wave MI, RCA     Obesity      Other asplenic status      Prediabetes      Sleep apnea      Urinary frequency        The following portions of the patient's history were reviewed and updated as appropriate: allergies, current medications, past family history, past medical history, past social history, past surgical history and problem list.           Objective:   Physical Exam:    Vitals:    02/05/21 0958   BP: (!) 168/98     There is no height or weight on file to calculate BMI.      General: Alert and oriented with normal affect overweight,. Attention, knowledge, memory, and language are intact. No acute distress.   Eyes: Sclerae are clear.  Respirations: Unlabored. CV: Mild/trace bilateral lower extremity.  Skin: No rashes seen.    Gait:  Nonantalgic  Full range of motion hips knees and ankles from seated.  Sensation is intact to light touch throughout the  lower extremities.  Reflexes are 1+ patellar and 0 Achilles    Manual muscle testing reveals:  Right /Left out of 5     5/5 hip flexors  5/5 knee flexors  5/5 knee extensors  5/5 ankle plantar flexors  5/5 ankle dorsiflexors  5/5  EHL

## 2021-06-16 PROBLEM — G47.33 OSA (OBSTRUCTIVE SLEEP APNEA): Status: ACTIVE | Noted: 2017-07-17

## 2021-06-16 PROBLEM — C61 PROSTATE CANCER METASTATIC TO INTRAPELVIC LYMPH NODE (H): Status: ACTIVE | Noted: 2020-01-03

## 2021-06-16 PROBLEM — N18.4 CKD (CHRONIC KIDNEY DISEASE) STAGE 4, GFR 15-29 ML/MIN (H): Status: ACTIVE | Noted: 2021-01-15

## 2021-06-16 PROBLEM — N18.30 CHRONIC KIDNEY DISEASE, STAGE 3 (H): Status: ACTIVE | Noted: 2020-11-05

## 2021-06-16 PROBLEM — I45.10 RBBB: Status: ACTIVE | Noted: 2019-06-10

## 2021-06-16 PROBLEM — C77.5 PROSTATE CANCER METASTATIC TO INTRAPELVIC LYMPH NODE (H): Status: ACTIVE | Noted: 2020-01-03

## 2021-06-16 PROBLEM — R35.0 BENIGN PROSTATIC HYPERPLASIA WITH URINARY FREQUENCY: Status: ACTIVE | Noted: 2019-06-03

## 2021-06-16 PROBLEM — E66.01 MORBID OBESITY (H): Status: ACTIVE | Noted: 2018-09-10

## 2021-06-16 PROBLEM — N40.1 BENIGN PROSTATIC HYPERPLASIA WITH URINARY FREQUENCY: Status: ACTIVE | Noted: 2019-06-03

## 2021-06-16 PROBLEM — D35.01 ADRENAL ADENOMA, RIGHT: Status: ACTIVE | Noted: 2017-08-07

## 2021-06-16 NOTE — TELEPHONE ENCOUNTER
Thank you for the opportunity to be a part in this patient's oral chemotherapy. The oral chemotherapy pharmacy team will no longer be following this patient for oral chemotherapy. If there are an questions or the plan changes, feel free to contact us.    Carlitos Osborn, PharmD, [facility]  Oral Chemotherapy Pharmacist  [phone]

## 2021-06-16 NOTE — PROGRESS NOTES
"  Office Visit - Follow Up   Manas Martinez   61 y.o. male    Date of Visit: 3/25/2021    Chief Complaint   Patient presents with     Follow-up     post op - adrenalectomy     Colon Cancer Screening     patient due, orders pending        Assessment and Plan   1. Hyperaldosteronism (H)  Doing well post surgery, blood pressure okay in clinic today, check labs as below weekly potassium and I will check an aldosterone level in 1 month  - Basic Metabolic Panel; Standing  - Aldosterone, Serum; Future  - Basic Metabolic Panel  - Aldosterone, Serum; Future    2. Secondary hypertension    3. Adrenal adenoma, right  - Basic Metabolic Panel; Standing  - Basic Metabolic Panel        Return in about 1 week (around 4/1/2021) for recheck.     History of Present Illness   This 61 y.o. old man comes in for post hospital follow-up.  He was at [facility] for right robotically assisted adrenalectomy.  This went well.  His postsurgical aldosterone level is quite low, undetectable although he is taking quite a bit of spironolactone the day before surgery.  He is still having some discomfort and pain from the surgery but otherwise doing well.  Having bowel movements and not using opioid pain medication.  He has reduced his antihypertensive medication and his blood pressures have been a little bit on the high side at home but much better than they have been.    Review of Systems: A comprehensive review of systems was negative except as noted.     Medications, Allergies and Problem List   Reviewed, reconciled and updated  Post Discharge Medication Reconciliation Status:      Physical Exam   General Appearance:   No acute distress    /82 (Patient Site: Left Arm, Patient Position: Sitting, Cuff Size: Adult Regular)   Pulse 70   Ht 5' 9\" (1.753 m)   Wt (!) 257 lb 8 oz (116.8 kg)   SpO2 96%   BMI 38.03 kg/m      Cardiovascular regular rate and rhythm no murmur gallop or rub lungs clear to auscultation bilaterally abdomen is obese " and tender     Additional Information   Current Outpatient Medications   Medication Sig Dispense Refill     carvediloL (COREG) 25 MG tablet Take 1 tablet (25 mg total) by mouth 2 (two) times a day with meals. 180 tablet 3     chlorthalidone (HYGROTEN) 25 MG tablet Take 1 tablet (25 mg total) by mouth daily. 90 tablet 3     cholecalciferol, vitamin D3, 50 mcg (2,000 unit) Tab Take 50 mcg by mouth.       doxazosin (CARDURA) 1 MG tablet Take 2 mg by mouth 2 (two) times a day.       leuprolide, 6 month, 45 mg syringe Inject 45 mg under the skin every 6 (six) months.       simvastatin (ZOCOR) 40 MG tablet Take 1 tablet (40 mg total) by mouth at bedtime. 90 tablet 2     potassium chloride 20 mEq TbER        No current facility-administered medications for this visit.      Allergies   Allergen Reactions     Atorvastatin Other (See Comments)     Leg Cramps     Social History     Tobacco Use     Smoking status: Former Smoker     Packs/day: 1.00     Years: 25.00     Pack years: 25.00     Types: Cigarettes     Quit date:      Years since quittin.2     Smokeless tobacco: Never Used   Substance Use Topics     Alcohol use: Yes     Alcohol/week: 10.0 standard drinks     Types: 10 Cans of beer per week     Drug use: No       Review and/or order of clinical lab tests:  Review and/or order of radiology tests:  Review and/or order of medicine tests:  Discussion of test results with performing physician:  Decision to obtain old records and/or obtain history from someone other than the patient:  Review and summarization of old records and/or obtaining history from someone other than the patient and.or discussion of case with another health care provider:  Independent visualization of image, tracing or specimen itself:    Time:      Waldo Kerr MD

## 2021-06-16 NOTE — PROGRESS NOTES
[facility] Hematology and Oncology Progress Note    Patient: Manas Martinez  MRN: [id number]  Date of Service: 03/23/2021      Assessment and Plan:    Cancer Staging  Prostate cancer metastatic to intrapelvic lymph node (H)  Staging form: Prostate, AJCC 8th Edition  - Clinical stage from 1/6/2020: Stage IVB (cT2c, cN1, pM1a, PSA: 5, Grade Group: 5) - Signed by Miguel Cordon MD on 1/6/2020    1.  Prostate cancer: Generally tolerating his Eligard pretty well.  Not complaining of any side effects.  He is on the 6-month depot injections.  His next will be due in late June.  We will set him up for that today.  We will plan on seeing him again at that time.      2.  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  He is working with his other doctors to wean off his blood pressure medications.     3.  Left renal mass/cyst: Described as an exophytic cyst from CT from November 2019.  Follow with imaging.  Further evaluation if it increases in size.    ECOG Performance   ECOG Performance Status: 0    Distress Assessment  Distress Assessment Score: 1    Pain  Currently in Pain: No/denies    Diagnosis:    1.  Metastatic prostate cancer: Biopsy of a retroperitoneal node in December 2019 showed adenocarcinoma.  CT scan showed adenopathy infolding the aortic bifurcation and mild pelvic adenopathy along the internal iliac chains bilaterally.  Bone scan at diagnosis was negative.  PET scan showed avid bilateral external iliac, left common iliac, aortocaval, and left retroperitoneal lymphadenopathy.  The most cephalad note is a 15 x 12 mm aortocaval node immediately anterior to L3.  Prostate MRI shows cancer involving almost the entire gland with broad capsular blurring without obvious evidence of extracapsular extension.  Prostate biopsy shows [place] grade 5+4 = 9, the majority appearing to be 4+4 = 8.  PSA at diagnosis was around 4.0.    Treatment:    Initially given Firmagon on December 11,  2019.    He received radiation to the prostate and pelvic nodes at the [facility].  Definitive radiotherapy to 70.2 Gy in 26 fractions of IMRT to the prostate, as well as treatment of the pelvic and para-aortic lymph nodes.  Treatment dates were June 8 through July 14, 2020.    Eligard q3 months and apalutamide started in January 2020.  We held the apalutamide in July, 2020 given elevated cholesterol and hypertension.    Interim History:    Manas returns today for a follow-up visit.  He was seen about 6 months ago.  In the interim he has been doing okay.  Energy and appetite are okay.  He had an adrenalectomy at the [facility] about a week ago.  Having some postoperative pain from that.  No acute complaints today.    Review of Systems:    Constitutional  Constitutional (WDL): Exceptions to WDL  Fatigue: Fatigue relieved by rest  Neurosensory  Neurosensory (WDL): All neurosensory elements are within defined limits  Cardiovascular  Cardiovascular (WDL): Exceptions to WDL  Edema: Yes  Edema Limbs: 5 - 10% inter-limb discrepancy in volume or circumference at point of greatest visible difference, swelling or obscuration of anatomic architecture on close inspection  Pulmonary  Respiratory (WDL): Within Defined Limits  Gastrointestinal  Gastrointestinal (WDL): All gastrointestinal elements are within defined limits  Genitourinary  Genitourinary (WDL): All genitourinary elements are within defined limits  Integumentary  Integumentary (WDL): All integumentary elements are within defined limits  Patient Coping  Patient Coping: Accepting  Accompanied by  Accompanied by: Alone    Past History:    Past Medical History:   Diagnosis Date     Adenocarcinoma of prostate (H)      Allergic rhinitis      Cancer (H)      Coronary artery disease      Deviated septum      Elevated PSA      Hyperlipemia      Hypertension      Joint pain      Myocardial infarction (H) 1999    Non Q wave MI, RCA     Obesity      Other asplenic status       Prediabetes      Sleep apnea      Urinary frequency      Physical Exam:    Recent Vitals 3/23/2021   Height -   Weight 257 lbs   BSA (m2) 2.38 m2   /82   Pulse 61   Temp 98.7   Temp src 1   SpO2 96   Some recent data might be hidden     General: patient appears stated age of 61 y.o.. Nontoxic and in no distress.   HEENT: Head: atraumatic, normocephalic. Sclerae anicteric.  Chest:  Normal respiratory effort  Cardiac: Trace bipedal edema.  Abdomen: abdomen is non-distended  Extremities: normal tone and muscle bulk.  Skin: no lesions or rash on visible skin.  CNS: alert and oriented. Grossly non-focal.   Psychiatric: normal mood and affect.     Lab Results:    Results for JADE HUIZAR (MRN [id number]) as of 3/23/2021 15:52   Ref. Range 3/12/2021 14:24   Sodium Latest Ref Range: 136 - 145 mmol/L 141   Potassium Latest Ref Range: 3.5 - 5.0 mmol/L 4.7   Chloride Latest Ref Range: 98 - 107 mmol/L 105   CO2 Latest Ref Range: 22 - 31 mmol/L 27   Anion Gap, Calculation Latest Ref Range: 5 - 18 mmol/L 9   BUN Latest Ref Range: 8 - 22 mg/dL 31 (H)   Creatinine Latest Ref Range: 0.70 - 1.30 mg/dL 1.48 (H)   GFR MDRD Af Amer Latest Ref Range: >60 mL/min/1.73m2 58 (L)   GFR MDRD Non Af Amer Latest Ref Range: >60 mL/min/1.73m2 48 (L)   Calcium Latest Ref Range: 8.5 - 10.5 mg/dL 9.8   Glucose Latest Ref Range: 70 - 125 mg/dL 115   PSA Latest Ref Range: 0.0 - 4.5 ng/mL <0.1       Signed by: Miguel Cordon MD

## 2021-06-16 NOTE — TELEPHONE ENCOUNTER
I called Manas today for a navigator follow up.  He is doing well overall.  No needs from me at this time.  He states he has my number and will call if I can assist him in the future.

## 2021-06-17 NOTE — PATIENT INSTRUCTIONS - HE
Patient Instructions by Laina Ac RN at 1/9/2020 10:30 AM     Author: Laina Ac RN Service: -- Author Type: Registered Nurse    Filed: 1/9/2020 11:09 AM Encounter Date: 1/9/2020 Status: Signed    : Laina Ac RN (Registered Nurse)       Patient Education     Radiation Therapy Treatment  Radiation therapy uses high-energy X-rays to kill cancer cells. Radiation therapy can help you in your fight against cancer. It begins with a session to discuss treatment with your healthcare provider. If you and your provider decide on radiation, you will return for a treatment planning visit called a simulation. Then you will start treatment.  The simulation is a planning session that helps your healthcare provider target your cancer. He or she will design a radiation plan to protect your healthy tissues from radiation. Your radiation therapy team uses a special machine called a simulator to map out your treatment. The simulator is usually an X-ray machine (fluoroscopy), CT scanner, MRI scanner, or PET-CT scanner machine. Laser lights act as guides to help position your body accurately. During this visit:    The team figures out the best position for your body. They make notes in your chart so youll be placed the same way each time.    They may use special devices to keep your body correctly positioned and still during treatment. These may include molds, masks, rests, and blocks.    The team makes ink marks on your skin. These will help you get in the same position for each treatment. Tiny permanent tattoos may also be used. These tattoos may be removed later with laser treatments.    Markers such as metal balls or wires may be put on or in your body. Sometime these are taped to the skin to help with the imaging process. These work with the X-rays to position your body. The markers are removed when the visit is over.  After the team has the imaging and data, the information is sent into the computer  planning system. Your doctor and the team of physicists and dosimetrists design a treatment field. The field will best target your cancer and how it might spread. It will also help limit radiation to nearby normal tissues.  Your treatments  When the simulation and plan are completed, you will begin your daily treatments. Treatment is usually once daily, Monday through Friday, for 5 to 7 weeks. It takes less than 30 minutes. Sometimes you may need radiation twice a day, with about 6 hours between treatments.  You may need to change into a hospital gown. The radiation therapist puts you in the correct position on the treatment table, then leaves the room. Sometimes you may need more imaging before each treatment. The machine may take digital X-rays or a CT scan to help make sure you are lined up correctly. During treatment, lie as still as you can and breathe normally. You will hear noises coming from the machine. You can talk to the radiation therapist, who watches you from the control room on a TV monitor. After treatment, the therapist will help you off the table. You can then get dressed and go back to your normal activities.  After treatment  After your radiation treatments are done, you will have follow-up appointments. These are to make sure the cancer is under control. Tell your healthcare team about any side effects from the treatment. The team will help you manage them.  Date Last Reviewed: 6/1/2018 2000-2019 [employer]. [address] [place] [address], [place] [address]. All rights reserved. This information is not intended as a substitute for professional medical care. Always follow your healthcare professional's instructions.

## 2021-06-20 NOTE — LETTER
Letter by Daya Rodriguez RN at      Author: Daya Rodriguez RN Service: -- Author Type: --    Filed:  Encounter Date: 12/13/2019 Status: Signed       Dear Manas,    Thank you for choosing [facility] for your care.  We are committed to providing you with the highest quality and compassionate healthcare services.  The following information pertains to your first appointment with our clinic.    Date/Time of appointment: Friday, 1/3/19, 12:30 PM       Name of your Physician: Dr. Cordon    What to bring to your appointment:    Completed Patient History/Initial Nursing Assessment and Medication/Allergy List (these forms were sent to you).    Any paperwork or films from your physician that we have asked you to bring.    Your current insurance card(s).    Parking:    Please refer to the map included to direct you.  The [facility] is located at the [place] end of [facility] in [place], [place].      After turning onto [address] from [facility], take a right turn at the first stop sign.  We have designated parking on the left, identified as parking for Cancer Care patients (Lot D).     The Code to Enter Lot D is: 0101. This code changes monthly and will always coincide with the current month followed by 01. For example August will be 0801.  The month will continue to change but the 01 will remain constant.  If lot D is full please use Parking Lot A, directly across the street.    Please enter the Cancer Care Center on the north end of the [facility] [place].  You will see a sign on the building.        For Medical Oncology or Hematology appointments, please take the elevator to the second floor to check in.   For Radiation Oncology appointments, please go straight through the double doors and check in.     Also please note appointments can last 1.5-2 hours.      We hope these instructions are helpful to you.  If you have any questions or concerns, please call us at [phone].  It is our pleasure  to assist you.    Warm Regards,  Jennifer Rodriguez, RN  Nurse Navigator  [phone]

## 2021-06-20 NOTE — PROGRESS NOTES
MALE PREVENTATIVE EXAM    Assessment and Plan:       Problem List Items Addressed This Visit     Pure hypercholesterolemia     History of coronary artery disease with stents.  Doing well.  Continue statin therapy as well as beta blocker therapy and aspirin therapy.         Relevant Medications    simvastatin (ZOCOR) 40 MG tablet    Other Relevant Orders    ALT (SGPT)    Lipid Cascade    Hypertension - Primary     Poorly controlled on quadruple therapy. Currently on clonidine, hydrochlorothiazide, lisinopril and metoprolol.  Denies any headaches, changes in vision, or warning signs.  As such I think he is safe to be discharged home.  But will get follow-up with nephrology for further evaluation.         Relevant Medications    cloNIDine (CATAPRES-TTS) 0.2 mg/24 hr    hydroCHLOROthiazide (HYDRODIURIL) 25 MG tablet    lisinopril (PRINIVIL,ZESTRIL) 40 MG tablet    metoprolol succinate (TOPROL-XL) 50 MG 24 hr tablet    Other Relevant Orders    Ambulatory referral to Nephrology    Basic Metabolic Panel    HM2(CBC w/o Differential)    Microalbumin, Random Urine    Thyroid Cascade    Prediabetes    KIERSTEN (obstructive sleep apnea)     Patient did not follow-up with sleep clinic as advised last year.  Still noncompliant with CPAP therapy         Adrenal adenoma, right     Initially found in 2008 on CT of the abdomen, it was also noticed again on the 2017 ultrasound, size has not changed         Obesity (BMI 35.0-39.9) with comorbidity (H)     Diet and exercise reviewed.           Other Visit Diagnoses     Screening for prostate cancer        Relevant Orders    PSA (Prostatic-Specific Antigen), Annual Screen    Screen for colon cancer        Relevant Orders    Cologuard    Ambulatory referral for Colonoscopy            Next follow up:  No Follow-up on file.    Immunization Review  Adult Imm Review: No immunizations due today  BMI: Diet and exercise were discussed    I discussed the following with the patient:   Adult Healthy  Living: Importance of regular exercise  Healthy nutrition  Getting adequate sleep  Stress management  Distracted driving    I have had an Advance Directives discussion with the patient.    Subjective:   Chief Complaint: Manas Martinez is an 58 y.o. male here for a preventative health visit.     HPI: Overall doing well.  Still having to urinate at night.  Blood pressure still not fully controlled.  Denies any chest pain, shortness of breath, dyspnea exertion, palpitations, nausea or vomiting.  Denies any change in vision or hearing or weakness.  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  He recently adjusted the hole in the base and was putting it back in the water today and then will start working on the upper portion.  He and his wife actually will be settling down to [place] in the near future to spend the winter on the boat and in [place].  Patient's wife actually had a complication with her colonoscopy and as such patient would like to change off colonoscopy over the color guard.  His 2008 colonoscopy was clear with no polyps.  Has no family history of colon cancer.    Healthy Habits  Are you taking a daily aspirin? Yes  Do you typically exercising at least 40 min, 3-4 times per week?  NO  Do you usually eat at least 4 servings of fruit and vegetables a day, include whole grains and fiber and avoid regularly eating high fat foods? NO  Have you had an eye exam in the past two years? Yes  Do you see a dentist twice per year? NO  Do you have any concerns regarding sleep? No    Safety Screen  If you own firearms, are they secured in a locked gun cabinet or with trigger locks? Yes  Do you feel you are safe where you are living?: Yes (9/10/2018  8:18 AM)  Do you feel you are safe in your relationship(s)?: Yes (9/10/2018  8:18 AM)    Review of Systems:  Please see above.  The rest of the review of systems are negative for all systems.     Cancer Screening       Status Date      COLOGUARD Overdue  "1/15/2008           History     Reviewed By Date/Time Sections Reviewed    Pratik Rico, DO 9/10/2018  8:32 AM Medical, Surgical, Tobacco, Alcohol, Drug Use, Sexual Activity, Family    Pepito Vazquez Jr., [facility] 9/10/2018  8:21 AM Family    Pepito Vazquez Jr., [facility] 9/10/2018  8:19 AM Medical, Surgical    Pepito Vazquez Jr., [facility] 9/10/2018  8:16 AM Tobacco, Alcohol, Drug Use, Sexual Activity, Socioeconomic            Objective:   Vital Signs:   Visit Vitals     BP (!) 165/97     Pulse 60     Temp 98.3  F (36.8  C) (Oral)     Resp 12     Ht 5' 8.5\" (1.74 m)     Wt (!) 256 lb (116.1 kg)     BMI 38.36 kg/m2          PHYSICAL EXAM  Physical Exam   Constitutional: He is oriented to person, place, and time. He appears well-developed and well-nourished.   HENT:   Head: Normocephalic and atraumatic.   Nose: Nose normal.   Mouth/Throat: Oropharynx is clear and moist.   Eyes: Conjunctivae and EOM are normal.   Cardiovascular: Normal rate, regular rhythm, normal heart sounds and intact distal pulses.    Pulmonary/Chest: Effort normal and breath sounds normal.   Abdominal: Soft. Bowel sounds are normal.   Neurological: He is alert and oriented to person, place, and time. He has normal strength and normal reflexes. No cranial nerve deficit or sensory deficit.   Reflex Scores:       Bicep reflexes are 2+ on the right side and 2+ on the left side.       Patellar reflexes are 2+ on the right side and 2+ on the left side.       Achilles reflexes are 2+ on the right side and 2+ on the left side.  Skin: Skin is warm and dry.   Psychiatric: He has a normal mood and affect.   Nursing note and vitals reviewed.       Medication List          These changes are accurate as of 9/10/18  8:57 AM.  If you have any questions, ask your nurse or doctor.               CHANGE how you take these medications          hydroCHLOROthiazide 25 MG tablet   Also known as:  HYDRODIURIL   INSTRUCTIONS:  Take 0.5 tablets (12.5 mg total) by mouth " daily.   What changed:  See the new instructions.   Changed by:  Pratik Rico, DO           lisinopril 40 MG tablet   Also known as:  PRINIVIL,ZESTRIL   INSTRUCTIONS:  Take 1 tablet (40 mg total) by mouth daily.   What changed:  See the new instructions.   Changed by:  Pratik Rico, DO           simvastatin 40 MG tablet   Also known as:  ZOCOR   INSTRUCTIONS:  Take 1 tablet (40 mg total) by mouth at bedtime.   What changed:  See the new instructions.   Changed by:  Pratik Rico, DO             CONTINUE taking these medications          aspirin 81 MG EC tablet   INSTRUCTIONS:  Take 81 mg by mouth daily. Delayed release.           cloNIDine 0.2 mg/24 hr   Also known as:  CATAPRES-TTS   INSTRUCTIONS:  PLACE 1 PATCH ON THE SKIN ONCE A WEEK   Doctor's comments:  Due to be seen           fluticasone 50 mcg/actuation nasal spray   Also known as:  FLONASE   INSTRUCTIONS:  2 sprays into each nostril daily.           metoprolol succinate 50 MG 24 hr tablet   Also known as:  TOPROL-XL   INSTRUCTIONS:  Take 1 tablet (50 mg total) by mouth daily.                Where to Get Your Medications      These medications were sent to [facility] ([place]) - [place], [place] - [address] AT [facility] [address]  [address], [facility] [address]     Phone:  [phone]      cloNIDine 0.2 mg/24 hr     hydroCHLOROthiazide 25 MG tablet     lisinopril 40 MG tablet     metoprolol succinate 50 MG 24 hr tablet     simvastatin 40 MG tablet             Additional Screenings Completed Today:

## 2021-06-21 ENCOUNTER — AMBULATORY - HEALTHEAST (OUTPATIENT)
Dept: INFUSION THERAPY | Facility: HOSPITAL | Age: 61
End: 2021-06-21

## 2021-06-21 DIAGNOSIS — C61 PROSTATE CANCER METASTATIC TO INTRAPELVIC LYMPH NODE (H): ICD-10-CM

## 2021-06-21 DIAGNOSIS — C77.5 PROSTATE CANCER METASTATIC TO INTRAPELVIC LYMPH NODE (H): ICD-10-CM

## 2021-06-21 LAB
ERYTHROCYTE [DISTWIDTH] IN BLOOD BY AUTOMATED COUNT: 15.9 % (ref 11–14.5)
HCT VFR BLD AUTO: 38.7 % (ref 40–54)
HGB BLD-MCNC: 12.7 G/DL (ref 14–18)
MCH RBC QN AUTO: 30.2 PG (ref 27–34)
MCHC RBC AUTO-ENTMCNC: 32.8 G/DL (ref 32–36)
MCV RBC AUTO: 92 FL (ref 80–100)
PLATELET # BLD AUTO: 375 THOU/UL (ref 140–440)
PMV BLD AUTO: 9.9 FL (ref 8.5–12.5)
PSA SERPL-MCNC: <0.1 NG/ML (ref 0–4.5)
RBC # BLD AUTO: 4.2 MILL/UL (ref 4.4–6.2)
WBC: 5.8 THOU/UL (ref 4–11)

## 2021-06-22 DIAGNOSIS — C61 PROSTATE CANCER METASTATIC TO INTRAPELVIC LYMPH NODE (H): Primary | ICD-10-CM

## 2021-06-22 DIAGNOSIS — C77.5 PROSTATE CANCER METASTATIC TO INTRAPELVIC LYMPH NODE (H): Primary | ICD-10-CM

## 2021-06-23 LAB
SHBG SERPL-SCNC: 25 NMOL/L (ref 11–80)
TESTOST FREE SERPL-MCNC: <1 NG/DL (ref 4.7–24.4)
TESTOST SERPL-MCNC: <2 NG/DL (ref 240–950)

## 2021-06-27 ENCOUNTER — HEALTH MAINTENANCE LETTER (OUTPATIENT)
Age: 61
End: 2021-06-27

## 2021-06-28 ENCOUNTER — INFUSION - HEALTHEAST (OUTPATIENT)
Dept: INFUSION THERAPY | Facility: HOSPITAL | Age: 61
End: 2021-06-28

## 2021-06-28 ENCOUNTER — OFFICE VISIT - HEALTHEAST (OUTPATIENT)
Dept: ONCOLOGY | Facility: HOSPITAL | Age: 61
End: 2021-06-28

## 2021-06-28 DIAGNOSIS — C77.5 PROSTATE CANCER METASTATIC TO INTRAPELVIC LYMPH NODE (H): ICD-10-CM

## 2021-06-28 DIAGNOSIS — N28.1 KIDNEY CYSTS: ICD-10-CM

## 2021-06-28 DIAGNOSIS — D35.01 ADRENAL ADENOMA, RIGHT: ICD-10-CM

## 2021-06-28 DIAGNOSIS — C61 PROSTATE CANCER METASTATIC TO INTRAPELVIC LYMPH NODE (H): ICD-10-CM

## 2021-06-28 DIAGNOSIS — E26.9 HYPERALDOSTERONISM (H): ICD-10-CM

## 2021-06-28 LAB
ANION GAP SERPL CALCULATED.3IONS-SCNC: 8 MMOL/L (ref 5–18)
BUN SERPL-MCNC: 32 MG/DL (ref 8–22)
CALCIUM SERPL-MCNC: 9.8 MG/DL (ref 8.5–10.5)
CHLORIDE BLD-SCNC: 104 MMOL/L (ref 98–107)
CO2 SERPL-SCNC: 27 MMOL/L (ref 22–31)
CREAT SERPL-MCNC: 1.39 MG/DL (ref 0.7–1.3)
GFR SERPL CREATININE-BSD FRML MDRD: 52 ML/MIN/1.73M2
GLUCOSE BLD-MCNC: 110 MG/DL (ref 70–125)
POTASSIUM BLD-SCNC: 4.3 MMOL/L (ref 3.5–5)
SODIUM SERPL-SCNC: 139 MMOL/L (ref 136–145)

## 2021-06-28 ASSESSMENT — MIFFLIN-ST. JEOR: SCORE: 1981.52

## 2021-06-30 ENCOUNTER — COMMUNICATION - HEALTHEAST (OUTPATIENT)
Dept: INTERNAL MEDICINE | Facility: CLINIC | Age: 61
End: 2021-06-30

## 2021-06-30 DIAGNOSIS — I10 ESSENTIAL HYPERTENSION: ICD-10-CM

## 2021-06-30 RX ORDER — CHLORTHALIDONE 25 MG/1
25 TABLET ORAL DAILY
Qty: 90 TABLET | Refills: 2 | Status: SHIPPED | OUTPATIENT
Start: 2021-06-30 | End: 2021-08-31

## 2021-07-04 NOTE — TELEPHONE ENCOUNTER
Telephone Encounter by Jorje Hussein, RN at 6/30/2021  2:46 PM     Author: Jorje Hussein RN Service: -- Author Type: Registered Nurse    Filed: 6/30/2021  2:49 PM Encounter Date: 6/30/2021 Status: Signed    : Jorje Hussein RN (Registered Nurse)       chlorthalidone (HYGROTEN) 25 MG tablet  25 mg, Oral, DAILY 1/15/2021 WALDO ARRIOLA 90 tablet 3 ordered           EditCancel Reorder       Summary: Take 1 tablet (25 mg total) by mouth daily., Starting Fri 1/15/2021, No Print   Dose, Frequency: 25 mg, DAILY  Ord/Sold: 1/15/2021 (O)  Report  Taking:   Long-term:   Pharmacy: [id number] [place], [place] - [address] AT [facility] [address]  Med Dose History       Patient Sig: Take 1 tablet (25 mg total) by mouth daily.       Ordered on: 1/15/2021        CLASS:  No print.  Sending refill to pharmacy per protocol.    Refill Approved    Rx renewed per Medication Renewal Policy. Medication was last renewed on 4/23/20.    Jorje Hussein, [facility] Triage/Med Refill 6/30/2021     Requested Prescriptions   Pending Prescriptions Disp Refills   ? chlorthalidone (HYGROTEN) 25 MG tablet 90 tablet 3     Sig: Take 1 tablet (25 mg total) by mouth daily.       Diuretics/Combination Diuretics Refill Protocol  Passed - 6/30/2021  1:21 PM        Passed - Visit with PCP or prescribing provider visit in past 12 months     Last office visit with prescriber/PCP: 3/25/2021 Waldo Arriola MD OR same dept: 3/25/2021 Waldo Arriola MD OR same specialty: 3/25/2021 Waldo Arriola MD  Last physical: 11/5/2020 Last MTM visit: Visit date not found   Next visit within 3 mo: Visit date not found  Next physical within 3 mo: Visit date not found  Prescriber OR PCP: Waldo Arriola MD  Last diagnosis associated with med order: 1. Hypertension  - chlorthalidone (HYGROTEN) 25 MG tablet; Take 1 tablet (25 mg total) by mouth daily.  Dispense: 90 tablet; Refill: 3    If protocol passes may  refill for 12 months if within 3 months of last provider visit (or a total of 15 months).             Passed - Serum Potassium in past 12 months      Lab Results   Component Value Date    Potassium 4.3 06/28/2021    Potassium, Urine 58.5 12/30/2020             Passed - Serum Sodium in past 12 months      Lab Results   Component Value Date    Sodium 139 06/28/2021    Sodium, Urine 41 12/30/2020             Passed - Blood pressure on file in past 12 months     BP Readings from Last 1 Encounters:   06/28/21 147/80             Passed - Serum Creatinine in past 12 months      Creatinine   Date Value Ref Range Status   06/28/2021 1.39 (H) 0.70 - 1.30 mg/dL Final

## 2021-07-06 VITALS
HEART RATE: 61 BPM | DIASTOLIC BLOOD PRESSURE: 80 MMHG | HEIGHT: 69 IN | BODY MASS INDEX: 39.06 KG/M2 | OXYGEN SATURATION: 97 % | SYSTOLIC BLOOD PRESSURE: 147 MMHG | WEIGHT: 263.7 LBS

## 2021-07-07 NOTE — PROGRESS NOTES
Pt here for eligard injection after seeing NP. Injection given RLQ abdomen and bandaid applied and pt d.c ambulatory to lobby alone. Pt aware of treatment plan.

## 2021-07-07 NOTE — PROGRESS NOTES
"Oncology Rooming Note    06/28/21 2:27 PM    Manas Martinez is a 61 y.o. male who presents for:    Chief Complaint   Patient presents with     HE Cancer     Prostate cancer metastatic to intrapelvic lymph node (H)       Initial Vitals: /80   Pulse 61   Ht 5' 9\" (1.753 m)   Wt (!) 263 lb 11.2 oz (119.6 kg)   SpO2 97%   BMI 38.94 kg/m       Estimated body mass index is 38.94 kg/m  as calculated from the following:    Height as of this encounter: 5' 9\" (1.753 m).    Weight as of this encounter: 263 lb 11.2 oz (119.6 kg).     Body surface area is 2.41 meters squared.      Allergies reviewed: Yes  Medications reviewed: Yes    Refills needed: No    Clinical concerns: no concerns      Edwina Ervin, [facility]      "

## 2021-07-07 NOTE — PROGRESS NOTES
[facility] Hematology and Oncology Progress Note    Patient: Manas Martinez  MRN: [id number]  Date of Service: 06/28/2021        Reason for Visit    Chief Complaint   Patient presents with     HE Cancer     Prostate cancer metastatic to intrapelvic lymph node (H)       Assessment and Plan  Cancer Staging  Prostate cancer metastatic to intrapelvic lymph node (H)  Staging form: Prostate, AJCC 8th Edition  - Clinical stage from 1/6/2020: Stage IVB (cT2c, cN1, pM1a, PSA: 5, Grade Group: 5) - Signed by Miguel Cordon MD on 1/6/2020    1. Prostate cancer, stage IVb with lymph node mets: pt is currently on Lupron. Was on apalutamide from January 2020 until July 2020. We stopped that due to rising cholesterol and hypertension. Pt did not want to start enzalutamide so has continued on lupron every 6 months. His psa wasn't really elevated at time of diagnosis, so not sure how sensitive of a marker that will be for us. That being said, his psa is currently undetectable. We will continue to monitor. Pt will be out of town in 3 months so will return in 6 months with labs, CT and MD visit with next dose of lupron.     2. Adenoma: had adrenalectomy in March 2021. BP is much better and he is feeling good.     3. Left renal mass/cyst: looks to be exophytic cyst on CT. Will monitor on scans.     ECOG Performance    0     Distress Assessment  Distress Assessment Score: 1    Pain  Currently in Pain: No/denies      Problem List    1. Prostate cancer metastatic to intrapelvic lymph node (H)  CT Chest Abdomen Pelvis Without Oral With IV Contrast    HM1(CBC and Differential)    Comprehensive Metabolic Panel    PSA (Prostatic-Specific Antigen), Diagnostic   2. Hyperaldosteronism (H)  Basic Metabolic Panel   3. Adrenal adenoma, right  Basic Metabolic Panel   4. Kidney cysts  CT Chest Abdomen Pelvis Without Oral With IV Contrast      ______________________________________________________________________________    History of Present  Illness    Diagnosis:     1.  Metastatic prostate cancer: Biopsy of a retroperitoneal node in December 2019 showed adenocarcinoma.  CT scan showed adenopathy infolding the aortic bifurcation and mild pelvic adenopathy along the internal iliac chains bilaterally.  Bone scan at diagnosis was negative.  PET scan showed avid bilateral external iliac, left common iliac, aortocaval, and left retroperitoneal lymphadenopathy.  The most cephalad note is a 15 x 12 mm aortocaval node immediately anterior to L3.  Prostate MRI shows cancer involving almost the entire gland with broad capsular blurring without obvious evidence of extracapsular extension.  Prostate biopsy shows Rosa grade 5+4 = 9, the majority appearing to be 4+4 = 8.     Treatment:     Initially given Firmagon on December 11, 2019.     He received radiation to the prostate and pelvic nodes at the [facility].  Definitive radiotherapy to 70.2 Gy in 26 fractions of IMRT to the prostate, as well as treatment of the pelvic and para-aortic lymph nodes.  Treatment dates were June 8 through July 14, 2020.     Eligard q3 months and apalutamide started in January 2020.     Interim History:  Pt is here today for follow up. He is doing fine. No new bone or back pain. Does have some generalized pain. No changes in his urine. Has hot flashes from lupron. Wanting to know if he could every go off lupron.     Review of Systems    ROS: As above in the history, otherwise the remainder of the ROS is negative and not contributory to current reason for visit.       Past History  Past Medical History:   Diagnosis Date     Adenocarcinoma of prostate (H)      Allergic rhinitis      Cancer (H)      Coronary artery disease      Deviated septum      Elevated PSA      Hyperlipemia      Hypertension      Joint pain      Myocardial infarction (H) 1999    Non Q wave MI, RCA     Obesity      Other asplenic status      Prediabetes      Sleep apnea      Urinary frequency        PHYSICAL  "EXAM  /80   Pulse 61   Ht 5' 9\" (1.753 m)   Wt (!) 263 lb 11.2 oz (119.6 kg)   SpO2 97%   BMI 38.94 kg/m      GENERAL: no acute distress. Cooperative in conversation. Here alone due to visitor restrictions. Mask on  RESP: Regular respiratory rate. No expiratory wheezes   MUSCULOSKELETAL: no bilateral leg swelling  NEURO: non focal. Alert and oriented x3.   PSYCH: within normal limits. No depression or anxiety.  SKIN: exposed skin is dry intact.     Lab Results    Recent Results (from the past 168 hour(s))   Basic Metabolic Panel   Result Value Ref Range    Sodium 139 136 - 145 mmol/L    Potassium 4.3 3.5 - 5.0 mmol/L    Chloride 104 98 - 107 mmol/L    CO2 27 22 - 31 mmol/L    Anion Gap, Calculation 8 5 - 18 mmol/L    Glucose 110 70 - 125 mg/dL    Calcium 9.8 8.5 - 10.5 mg/dL    BUN 32 (H) 8 - 22 mg/dL    Creatinine 1.39 (H) 0.70 - 1.30 mg/dL    GFR MDRD Af Amer >60 >60 mL/min/1.73m2    GFR MDRD Non Af Amer 52 (L) >60 mL/min/1.73m2     Lab Results   Component Value Date    PSA <0.1 06/21/2021    PSA <0.1 03/12/2021    PSA <0.1 12/16/2020    PSA <0.1 10/20/2020    PSA <0.1 04/14/2020    PSA 0.1 02/06/2020    PSA 0.8 01/08/2020    PSA 3.9 06/03/2019    PSA 3.2 09/10/2018   ]  Imaging    No results found.      Signed by: Marietta Pearce, CNP  "

## 2021-07-20 ENCOUNTER — TRANSFERRED RECORDS (OUTPATIENT)
Dept: HEALTH INFORMATION MANAGEMENT | Facility: CLINIC | Age: 61
End: 2021-07-20

## 2021-08-11 DIAGNOSIS — E78.00 PURE HYPERCHOLESTEROLEMIA: ICD-10-CM

## 2021-08-11 RX ORDER — SIMVASTATIN 40 MG
40 TABLET ORAL AT BEDTIME
Qty: 90 TABLET | Refills: 2 | Status: SHIPPED | OUTPATIENT
Start: 2021-08-11 | End: 2023-03-27

## 2021-08-31 ENCOUNTER — OFFICE VISIT (OUTPATIENT)
Dept: INTERNAL MEDICINE | Facility: CLINIC | Age: 61
End: 2021-08-31
Payer: COMMERCIAL

## 2021-08-31 VITALS
OXYGEN SATURATION: 98 % | BODY MASS INDEX: 39.8 KG/M2 | HEIGHT: 69 IN | HEART RATE: 60 BPM | WEIGHT: 268.7 LBS | DIASTOLIC BLOOD PRESSURE: 84 MMHG | SYSTOLIC BLOOD PRESSURE: 122 MMHG

## 2021-08-31 DIAGNOSIS — I15.9 SECONDARY HYPERTENSION: ICD-10-CM

## 2021-08-31 DIAGNOSIS — R73.9 HYPERGLYCEMIA: ICD-10-CM

## 2021-08-31 DIAGNOSIS — D35.01 ADRENAL ADENOMA, RIGHT: ICD-10-CM

## 2021-08-31 DIAGNOSIS — E66.01 SEVERE OBESITY (H): ICD-10-CM

## 2021-08-31 DIAGNOSIS — C61 PROSTATE CANCER METASTATIC TO INTRAPELVIC LYMPH NODE (H): ICD-10-CM

## 2021-08-31 DIAGNOSIS — E78.2 MIXED HYPERLIPIDEMIA: ICD-10-CM

## 2021-08-31 DIAGNOSIS — Z90.81 ASPLENIA AFTER SURGICAL PROCEDURE: ICD-10-CM

## 2021-08-31 DIAGNOSIS — I10 ESSENTIAL HYPERTENSION: ICD-10-CM

## 2021-08-31 DIAGNOSIS — C77.5 PROSTATE CANCER METASTATIC TO INTRAPELVIC LYMPH NODE (H): ICD-10-CM

## 2021-08-31 DIAGNOSIS — I25.10 CORONARY ARTERY DISEASE DUE TO LIPID RICH PLAQUE: ICD-10-CM

## 2021-08-31 DIAGNOSIS — N18.4 CKD (CHRONIC KIDNEY DISEASE) STAGE 4, GFR 15-29 ML/MIN (H): ICD-10-CM

## 2021-08-31 DIAGNOSIS — J30.2 SEASONAL ALLERGIC RHINITIS, UNSPECIFIED TRIGGER: ICD-10-CM

## 2021-08-31 DIAGNOSIS — I25.83 CORONARY ARTERY DISEASE DUE TO LIPID RICH PLAQUE: ICD-10-CM

## 2021-08-31 DIAGNOSIS — Z00.00 ANNUAL PHYSICAL EXAM: Primary | ICD-10-CM

## 2021-08-31 PROBLEM — N18.30 CHRONIC KIDNEY DISEASE, STAGE 3 (H): Status: RESOLVED | Noted: 2020-11-05 | Resolved: 2021-08-31

## 2021-08-31 LAB
ALBUMIN SERPL-MCNC: 3.8 G/DL (ref 3.5–5)
ALP SERPL-CCNC: 71 U/L (ref 45–120)
ALT SERPL W P-5'-P-CCNC: 30 U/L (ref 0–45)
ANION GAP SERPL CALCULATED.3IONS-SCNC: 9 MMOL/L (ref 5–18)
AST SERPL W P-5'-P-CCNC: 18 U/L (ref 0–40)
BILIRUB SERPL-MCNC: 0.5 MG/DL (ref 0–1)
BUN SERPL-MCNC: 39 MG/DL (ref 8–22)
CALCIUM SERPL-MCNC: 10.2 MG/DL (ref 8.5–10.5)
CHLORIDE BLD-SCNC: 104 MMOL/L (ref 98–107)
CHOLEST SERPL-MCNC: 196 MG/DL
CO2 SERPL-SCNC: 26 MMOL/L (ref 22–31)
CREAT SERPL-MCNC: 1.33 MG/DL (ref 0.7–1.3)
CREAT UR-MCNC: 78 MG/DL
ERYTHROCYTE [DISTWIDTH] IN BLOOD BY AUTOMATED COUNT: 13.7 % (ref 10–15)
FASTING STATUS PATIENT QL REPORTED: YES
GFR SERPL CREATININE-BSD FRML MDRD: 56 ML/MIN/1.73M2
GLUCOSE BLD-MCNC: 132 MG/DL (ref 70–125)
HBA1C MFR BLD: 5.9 % (ref 0–5.6)
HCT VFR BLD AUTO: 39.9 % (ref 40–53)
HDLC SERPL-MCNC: 49 MG/DL
HGB BLD-MCNC: 12.9 G/DL (ref 13.3–17.7)
LDLC SERPL CALC-MCNC: 123 MG/DL
MCH RBC QN AUTO: 30.9 PG (ref 26.5–33)
MCHC RBC AUTO-ENTMCNC: 32.3 G/DL (ref 31.5–36.5)
MCV RBC AUTO: 96 FL (ref 78–100)
MICROALBUMIN UR-MCNC: 1.14 MG/DL (ref 0–1.99)
MICROALBUMIN/CREAT UR: 14.6 MG/G CR
PLATELET # BLD AUTO: 402 10E3/UL (ref 150–450)
POTASSIUM BLD-SCNC: 4.5 MMOL/L (ref 3.5–5)
PROT SERPL-MCNC: 7.6 G/DL (ref 6–8)
RBC # BLD AUTO: 4.18 10E6/UL (ref 4.4–5.9)
SODIUM SERPL-SCNC: 139 MMOL/L (ref 136–145)
TRIGL SERPL-MCNC: 118 MG/DL
WBC # BLD AUTO: 5.1 10E3/UL (ref 4–11)

## 2021-08-31 PROCEDURE — 85027 COMPLETE CBC AUTOMATED: CPT | Performed by: INTERNAL MEDICINE

## 2021-08-31 PROCEDURE — 80061 LIPID PANEL: CPT | Performed by: INTERNAL MEDICINE

## 2021-08-31 PROCEDURE — 36415 COLL VENOUS BLD VENIPUNCTURE: CPT | Performed by: INTERNAL MEDICINE

## 2021-08-31 PROCEDURE — 99213 OFFICE O/P EST LOW 20 MIN: CPT | Mod: 25 | Performed by: INTERNAL MEDICINE

## 2021-08-31 PROCEDURE — 82043 UR ALBUMIN QUANTITATIVE: CPT | Performed by: INTERNAL MEDICINE

## 2021-08-31 PROCEDURE — 80053 COMPREHEN METABOLIC PANEL: CPT | Performed by: INTERNAL MEDICINE

## 2021-08-31 PROCEDURE — 83036 HEMOGLOBIN GLYCOSYLATED A1C: CPT | Performed by: INTERNAL MEDICINE

## 2021-08-31 PROCEDURE — 99396 PREV VISIT EST AGE 40-64: CPT | Performed by: INTERNAL MEDICINE

## 2021-08-31 RX ORDER — FLUTICASONE PROPIONATE 50 MCG
1 SPRAY, SUSPENSION (ML) NASAL DAILY
Qty: 16 G | Refills: 3 | Status: SHIPPED | OUTPATIENT
Start: 2021-08-31 | End: 2023-05-05

## 2021-08-31 RX ORDER — CHLORTHALIDONE 25 MG/1
12.5 TABLET ORAL DAILY
Qty: 90 TABLET | Refills: 3
Start: 2021-08-31 | End: 2022-12-08

## 2021-08-31 RX ORDER — CETIRIZINE HYDROCHLORIDE 10 MG/1
10 TABLET ORAL DAILY
Qty: 90 TABLET | Refills: 3 | Status: SHIPPED | OUTPATIENT
Start: 2021-08-31 | End: 2022-06-15

## 2021-08-31 RX ORDER — CARVEDILOL 25 MG/1
12.5 TABLET ORAL 2 TIMES DAILY WITH MEALS
Qty: 180 TABLET | Refills: 3
Start: 2021-08-31 | End: 2022-06-15

## 2021-08-31 ASSESSMENT — MIFFLIN-ST. JEOR: SCORE: 2004.2

## 2021-08-31 NOTE — PROGRESS NOTES
SUBJECTIVE:   CC: Manas Martinez is an 61 year old male who presents for preventative health visit.   58-year-old man comes in for annual wellness visit and follow-up.  Overall doing quite well.  Blood pressure has been controlled after adrenalectomy.  Is on many fewer medications.  Lower extremity edema resolved.  He would like to decrease chlorthalidone dosing.  Met with [facility] and he has completed 2 years of Lupron therapy.  Is recommended that he start this now.  Check PSAs every 6 months.  He plans to travel to [place] by boat.  Having some allergy symptoms.    Patient has been advised of split billing requirements and indicates understanding:   Healthy Habits:     Getting at least 3 servings of Calcium per day:  Yes    Bi-annual eye exam:  NO    Dental care twice a year:  Yes    Sleep apnea or symptoms of sleep apnea:  Sleep apnea    Diet:  Regular (no restrictions) and Low salt    Frequency of exercise:  2-3 days/week    Duration of exercise:  45-60 minutes    Taking medications regularly:  Yes    Medication side effects:  Significant flushing and Other    PHQ-2 Total Score: 0    Additional concerns today:  No              Today's PHQ-2 Score:   PHQ-2 (  [employer]) 2021   Q1: Little interest or pleasure in doing things 0   Q2: Feeling down, depressed or hopeless 0   PHQ-2 Score 0   Q1: Little interest or pleasure in doing things Not at all   Q2: Feeling down, depressed or hopeless Not at all   PHQ-2 Score 0       Abuse: Current or Past(Physical, Sexual or Emotional)- No  Do you feel safe in your environment? Yes        Social History     Tobacco Use     Smoking status: Former Smoker     Packs/day: 1.00     Years: 25.00     Pack years: 25.00     Types: Cigarettes, Cigarettes     Quit date: 1999     Years since quittin.6     Smokeless tobacco: Never Used   Substance Use Topics     Alcohol use: Yes     Alcohol/week: 10.0 standard drinks         Alcohol Use 2021   Prescreen: >3 drinks/day  "or >7 drinks/week? Yes   AUDIT SCORE  5       Last PSA:   Prostate Specific Antigen Screen   Date Value Ref Range Status   06/21/2021 <0.1 0.00 - 4.50 ng/mL Final       Reviewed orders with patient. Reviewed health maintenance and updated orders accordingly -       Reviewed and updated as needed this visit by clinical staff  Tobacco  Allergies  Meds              Reviewed and updated as needed this visit by Provider                    Review of Systems  CONSTITUTIONAL: NEGATIVE for fever, chills, change in weight  INTEGUMENTARY/SKIN: NEGATIVE for worrisome rashes, moles or lesions  EYES: NEGATIVE for vision changes or irritation  ENT: NEGATIVE for ear, mouth and throat problems  RESP: NEGATIVE for significant cough or SOB  CV: NEGATIVE for chest pain, palpitations or peripheral edema  GI: NEGATIVE for nausea, abdominal pain, heartburn, or change in bowel habits   male: negative for dysuria, hematuria, decreased urinary stream, erectile dysfunction, urethral discharge  MUSCULOSKELETAL: NEGATIVE for significant arthralgias or myalgia  NEURO: NEGATIVE for weakness, dizziness or paresthesias  PSYCHIATRIC: NEGATIVE for changes in mood or affect    OBJECTIVE:   /84 (BP Location: Left arm, Patient Position: Sitting, Cuff Size: Adult Large)   Pulse 60   Ht 1.753 m (5' 9\")   Wt 121.9 kg (268 lb 11.2 oz)   SpO2 98%   BMI 39.68 kg/m      Physical Exam  EYES: Eyelids, conjunctiva, and sclera were normal. Pupils were normal. Cornea, iris, and lens were normal bilaterally.  HEAD, EARS, NOSE, MOUTH, AND THROAT: Head and face were normal. Hearing was normal to voice and the ears were normal to external exam. Nose appearance was normal and there was no discharge. Oropharynx was normal.  NECK: Neck appearance was normal. There were no neck masses and the thyroid was not enlarged.  RESPIRATORY: Breathing pattern was normal and the chest moved symmetrically.  Percussion/auscultatory percussion was normal.  Lung sounds " were normal and there were no abnormal sounds.  CARDIOVASCULAR: Heart rate and rhythm were normal.  S1 and S2 were normal and there were no extra sounds or murmurs. Peripheral pulses in arms and legs were normal.  Jugular venous pressure was normal.  There was no peripheral edema.  GASTROINTESTINAL: The abdomen was normal in contour.  Bowel sounds were present.  Percussion detected no organ enlargement or tenderness.  Palpation detected no tenderness, mass, or enlarged organs.   MUSCULOSKELETAL: Skeletal configuration was normal and muscle mass was normal for age. Joint appearance was overall normal.  LYMPHATIC: There were no enlarged nodes.  SKIN/HAIR/NAILS: Skin color was normal.  There were no skin lesions.  Hair and nails were normal.  NEUROLOGIC: The patient was alert and oriented to person, place, time, and circumstance. Speech was normal. Cranial nerves were normal. Motor strength was normal for age. The patient was normally coordinated.  PSYCHIATRIC:  Mood and affect were normal and the patient had normal recent and remote memory. The patient's judgment and insight were normal.    ASSESSMENT/PLAN:   1. Annual physical exam  This is a 60-year-old man is generally doing quite well.  I recommended influenza vaccination.    2. Adrenal adenoma, right  3. Secondary hypertension  Doing well post adrenalectomy, okay to decrease chlorthalidone, continue carvedilol    4. Essential hypertension  As above  - carvedilol (COREG) 25 MG tablet; Take 0.5 tablets (12.5 mg) by mouth 2 times daily (with meals)  Dispense: 180 tablet; Refill: 3  - chlorthalidone (HYGROTON) 25 MG tablet; Take 0.5 tablets (12.5 mg) by mouth daily  Dispense: 90 tablet; Refill: 3    5. Coronary artery disease due to lipid rich plaque  Continue secondary prevention    6. CKD (chronic kidney disease) stage 4, GFR 15-29 ml/min (H)  Stable  - Comprehensive metabolic panel; Future  - CBC with platelets; Future  - Albumin Random Urine Quantitative with  "Creat Ratio; Future  - Albumin Random Urine Quantitative with Creat Ratio  - Comprehensive metabolic panel  - CBC with platelets    7. Prostate cancer metastatic to intrapelvic lymph node (H)  Check PSA every 6 months of Lupron for now per [facility]    8. Asplenia after surgical procedure  Vaccinations up-to-date    9. Mixed hyperlipidemia  Continue statin  - Lipid panel reflex to direct LDL Fasting; Future  - Lipid panel reflex to direct LDL Fasting    10. Hyperglycemia  Discussed importance of reduction carbohydrates calories increase in physical activity modest weight loss  - Hemoglobin A1c; Future  - aspirin (ASA) 81 MG EC tablet; Take 1 tablet (81 mg) by mouth daily  - Hemoglobin A1c    11. Seasonal allergic rhinitis, unspecified trigger  - fluticasone (FLONASE) 50 MCG/ACT nasal spray; Spray 1 spray into both nostrils daily  Dispense: 16 g; Refill: 3  - cetirizine (ZYRTEC) 10 MG tablet; Take 1 tablet (10 mg) by mouth daily  Dispense: 90 tablet; Refill: 3    12. Severe obesity (H)  Estimated body mass index is 39.68 kg/m  as calculated from the following:    Height as of this encounter: 1.753 m (5' 9\").    Weight as of this encounter: 121.9 kg (268 lb 11.2 oz).     Weight management plan: Patient referred to endocrine and/or weight management specialty    He reports that he quit smoking about 22 years ago. His smoking use included cigarettes and cigarettes. He has a 25.00 pack-year smoking history. He has never used smokeless tobacco.      Counseling Resources:  ATP IV Guidelines  Pooled Cohorts Equation Calculator  FRAX Risk Assessment  ICSI Preventive Guidelines  Dietary Guidelines for Americans, 2010  USDA's MyPlate  ASA Prophylaxis  Lung CA Screening    Waldo Kerr MD  [facility]  "

## 2021-10-16 ENCOUNTER — HEALTH MAINTENANCE LETTER (OUTPATIENT)
Age: 61
End: 2021-10-16

## 2021-11-01 ENCOUNTER — MYC MEDICAL ADVICE (OUTPATIENT)
Dept: INTERNAL MEDICINE | Facility: CLINIC | Age: 61
End: 2021-11-01

## 2021-11-01 DIAGNOSIS — I15.9 SECONDARY HYPERTENSION: Primary | ICD-10-CM

## 2021-11-04 ENCOUNTER — TELEPHONE (OUTPATIENT)
Dept: INTERNAL MEDICINE | Facility: CLINIC | Age: 61
End: 2021-11-04

## 2021-11-04 RX ORDER — DOXAZOSIN 1 MG/1
1 TABLET ORAL DAILY
Qty: 90 TABLET | Refills: 3 | Status: SHIPPED | OUTPATIENT
Start: 2021-11-04 | End: 2022-12-08

## 2021-12-20 ENCOUNTER — LAB (OUTPATIENT)
Dept: INFUSION THERAPY | Facility: HOSPITAL | Age: 61
End: 2021-12-20
Attending: INTERNAL MEDICINE
Payer: COMMERCIAL

## 2021-12-20 ENCOUNTER — HOSPITAL ENCOUNTER (OUTPATIENT)
Dept: CT IMAGING | Facility: HOSPITAL | Age: 61
End: 2021-12-20
Attending: NURSE PRACTITIONER
Payer: COMMERCIAL

## 2021-12-20 DIAGNOSIS — C77.5 PROSTATE CANCER METASTATIC TO INTRAPELVIC LYMPH NODE (H): ICD-10-CM

## 2021-12-20 DIAGNOSIS — C61 PROSTATE CANCER METASTATIC TO INTRAPELVIC LYMPH NODE (H): ICD-10-CM

## 2021-12-20 LAB
ALBUMIN SERPL-MCNC: 3.8 G/DL (ref 3.5–5)
ALP SERPL-CCNC: 73 U/L (ref 45–120)
ALT SERPL W P-5'-P-CCNC: 26 U/L (ref 0–45)
ANION GAP SERPL CALCULATED.3IONS-SCNC: 7 MMOL/L (ref 5–18)
AST SERPL W P-5'-P-CCNC: 18 U/L (ref 0–40)
BASOPHILS # BLD AUTO: 0.1 10E3/UL (ref 0–0.2)
BASOPHILS NFR BLD AUTO: 1 %
BILIRUB SERPL-MCNC: 0.5 MG/DL (ref 0–1)
BUN SERPL-MCNC: 36 MG/DL (ref 8–22)
CALCIUM SERPL-MCNC: 9.8 MG/DL (ref 8.5–10.5)
CHLORIDE BLD-SCNC: 103 MMOL/L (ref 98–107)
CO2 SERPL-SCNC: 29 MMOL/L (ref 22–31)
CREAT BLD-MCNC: 1.5 MG/DL (ref 0.7–1.3)
CREAT SERPL-MCNC: 1.46 MG/DL (ref 0.7–1.3)
EOSINOPHIL # BLD AUTO: 0.4 10E3/UL (ref 0–0.7)
EOSINOPHIL NFR BLD AUTO: 7 %
ERYTHROCYTE [DISTWIDTH] IN BLOOD BY AUTOMATED COUNT: 13.8 % (ref 10–15)
GFR SERPL CREATININE-BSD FRML MDRD: 49 ML/MIN/1.73M2
GFR SERPL CREATININE-BSD FRML MDRD: 50 ML/MIN/1.73M2
GLUCOSE BLD-MCNC: 118 MG/DL (ref 70–125)
HCT VFR BLD AUTO: 38.8 % (ref 40–53)
HGB BLD-MCNC: 12.7 G/DL (ref 13.3–17.7)
IMM GRANULOCYTES # BLD: 0 10E3/UL
IMM GRANULOCYTES NFR BLD: 1 %
LYMPHOCYTES # BLD AUTO: 1.1 10E3/UL (ref 0.8–5.3)
LYMPHOCYTES NFR BLD AUTO: 19 %
MCH RBC QN AUTO: 31.1 PG (ref 26.5–33)
MCHC RBC AUTO-ENTMCNC: 32.7 G/DL (ref 31.5–36.5)
MCV RBC AUTO: 95 FL (ref 78–100)
MONOCYTES # BLD AUTO: 0.8 10E3/UL (ref 0–1.3)
MONOCYTES NFR BLD AUTO: 13 %
NEUTROPHILS # BLD AUTO: 3.4 10E3/UL (ref 1.6–8.3)
NEUTROPHILS NFR BLD AUTO: 59 %
NRBC # BLD AUTO: 0 10E3/UL
NRBC BLD AUTO-RTO: 0 /100
PLATELET # BLD AUTO: 384 10E3/UL (ref 150–450)
POTASSIUM BLD-SCNC: 4.5 MMOL/L (ref 3.5–5)
PROT SERPL-MCNC: 7.8 G/DL (ref 6–8)
PSA SERPL-MCNC: <0.1 UG/L (ref 0–4.5)
RBC # BLD AUTO: 4.08 10E6/UL (ref 4.4–5.9)
SODIUM SERPL-SCNC: 139 MMOL/L (ref 136–145)
WBC # BLD AUTO: 5.8 10E3/UL (ref 4–11)

## 2021-12-20 PROCEDURE — 250N000011 HC RX IP 250 OP 636: Performed by: NURSE PRACTITIONER

## 2021-12-20 PROCEDURE — 82565 ASSAY OF CREATININE: CPT | Mod: 91

## 2021-12-20 PROCEDURE — 80053 COMPREHEN METABOLIC PANEL: CPT

## 2021-12-20 PROCEDURE — 36415 COLL VENOUS BLD VENIPUNCTURE: CPT

## 2021-12-20 PROCEDURE — 74177 CT ABD & PELVIS W/CONTRAST: CPT

## 2021-12-20 PROCEDURE — 84153 ASSAY OF PSA TOTAL: CPT

## 2021-12-20 PROCEDURE — 85025 COMPLETE CBC W/AUTO DIFF WBC: CPT

## 2021-12-20 RX ORDER — IOPAMIDOL 755 MG/ML
75 INJECTION, SOLUTION INTRAVASCULAR ONCE
Status: COMPLETED | OUTPATIENT
Start: 2021-12-20 | End: 2021-12-20

## 2021-12-20 RX ADMIN — IOPAMIDOL 75 ML: 755 INJECTION, SOLUTION INTRAVENOUS at 13:53

## 2021-12-29 ENCOUNTER — INFUSION THERAPY VISIT (OUTPATIENT)
Dept: INFUSION THERAPY | Facility: HOSPITAL | Age: 61
End: 2021-12-29
Attending: INTERNAL MEDICINE
Payer: COMMERCIAL

## 2021-12-29 ENCOUNTER — ONCOLOGY VISIT (OUTPATIENT)
Dept: ONCOLOGY | Facility: HOSPITAL | Age: 61
End: 2021-12-29
Attending: INTERNAL MEDICINE
Payer: COMMERCIAL

## 2021-12-29 VITALS
BODY MASS INDEX: 41.2 KG/M2 | DIASTOLIC BLOOD PRESSURE: 96 MMHG | TEMPERATURE: 98.1 F | SYSTOLIC BLOOD PRESSURE: 164 MMHG | HEART RATE: 64 BPM | RESPIRATION RATE: 12 BRPM | WEIGHT: 279 LBS | OXYGEN SATURATION: 97 %

## 2021-12-29 DIAGNOSIS — C61 PROSTATE CANCER METASTATIC TO INTRAPELVIC LYMPH NODE (H): Primary | ICD-10-CM

## 2021-12-29 DIAGNOSIS — C77.5 PROSTATE CANCER METASTATIC TO INTRAPELVIC LYMPH NODE (H): ICD-10-CM

## 2021-12-29 DIAGNOSIS — Z53.9 ERRONEOUS ENCOUNTER--DISREGARD: Primary | ICD-10-CM

## 2021-12-29 DIAGNOSIS — C61 PROSTATE CANCER METASTATIC TO INTRAPELVIC LYMPH NODE (H): ICD-10-CM

## 2021-12-29 DIAGNOSIS — C77.5 PROSTATE CANCER METASTATIC TO INTRAPELVIC LYMPH NODE (H): Primary | ICD-10-CM

## 2021-12-29 PROCEDURE — 99214 OFFICE O/P EST MOD 30 MIN: CPT | Performed by: NURSE PRACTITIONER

## 2021-12-29 PROCEDURE — G0463 HOSPITAL OUTPT CLINIC VISIT: HCPCS | Mod: 25

## 2021-12-29 ASSESSMENT — PAIN SCALES - GENERAL: PAINLEVEL: NO PAIN (0)

## 2021-12-29 NOTE — LETTER
12/29/2021         RE: Manas Martinez  [address]  [place] [address]        Dear Colleague,    Thank you for referring your patient, Manas Martinez, to the [facility] [place]. Please see a copy of my visit note below.    [facility] Hematology and Oncology Progress Note    Patient: Manas Martinez  MRN: [id number]  Date of Service: Dec 29, 2021          Reason for Visit    Chief Complaint   Patient presents with     Oncology Clinic Visit     Prostate cancer metastatic to intrapelvic lymph node (H)       Assessment and Plan    Cancer Staging  No matching staging information was found for the patient.    1. Prostate cancer, stage IVb with lymph node mets: pt is currently on Lupron. Was on apalutamide from January 2020 until July 2020. We stopped that due to rising cholesterol and hypertension. Pt did not want to start enzalutamide so has continued on lupron every 6 months. His psa wasn't really elevated at time of diagnosis, so not sure how sensitive of a marker that will be for us. That being said, his psa is currently undetectable. We will continue to monitor.  Per the radiation doctors recommendation at the [facility] he suggested doing the Lupron for 2 years.  He has now reached his 2-year srinivas so patient would like to stop that.  He feels comfortable with that plan.  He will monitor his PSA roughly every 3 months.  He will see Dr. Cordon in 1 year with a CT scan.     2. Adenoma: had adrenalectomy in March 2021. BP is much better and he is feeling good.      3. Left renal mass/cyst: looks to be exophytic cyst on CT. Will monitor on scans.     ECOG Performance    0 - Independent    Distress Screening (within last 30 days)    No data recorded     Pain  Pain Score: No Pain (0)    Problem List    Patient Active Problem List   Diagnosis     Allergic Rhinitis     Diffuse Joint Pains (Arthralgias)     Coronary artery disease due to lipid rich plaque     Secondary hypertension      Prediabetes     Asplenia after surgical procedure     KIERSTEN not on CPAP     Adrenal adenoma, right     Obesity (BMI 35.0-39.9) with comorbidity (H)     Benign prostatic hyperplasia with urinary frequency     RBBB     Prostate cancer metastatic to intrapelvic lymph node (H)     CKD (chronic kidney disease) stage 4, GFR 15-29 ml/min (H)        ______________________________________________________________________________    History of Present Illness    Diagnosis:     1.  Metastatic prostate cancer: Biopsy of a retroperitoneal node in December 2019 showed adenocarcinoma.  CT scan showed adenopathy infolding the aortic bifurcation and mild pelvic adenopathy along the internal iliac chains bilaterally.  Bone scan at diagnosis was negative.  PET scan showed avid bilateral external iliac, left common iliac, aortocaval, and left retroperitoneal lymphadenopathy.  The most cephalad note is a 15 x 12 mm aortocaval node immediately anterior to L3.  Prostate MRI shows cancer involving almost the entire gland with broad capsular blurring without obvious evidence of extracapsular extension.  Prostate biopsy shows Rosa grade 5+4 = 9, the majority appearing to be 4+4 = 8.     Treatment:     Initially given Firmagon on December 11, 2019.     He received radiation to the prostate and pelvic nodes at the [facility].  Definitive radiotherapy to 70.2 Gy in 26 fractions of IMRT to the prostate, as well as treatment of the pelvic and para-aortic lymph nodes.  Treatment dates were June 8 through July 14, 2020.     Eligard q6 months and apalutamide started in January 2020.  Only took the apalutamide until July 2020.  Stopped due to hypertension and cholesterol issues     Interim History:    Patient is here today for 6-month follow-up visit.  Overall he says he is doing fine and has no new issues.  Denies any new bone or back pain.  Denies any weight loss.  States that he is feeling good.  He wants to get his results from his lab work as  well as a CT scan and other than that is hoping to stop his Eligard.    Review of Systems    Pertinent items are noted in HPI.    Past History    Past Medical History:   Diagnosis Date     Adenocarcinoma of prostate (H)      Allergic rhinitis      Cancer (H)      Coronary artery disease      Deviated septum      Elevated PSA      Hyperlipemia      Hypertension      Joint pain      Myocardial infarction (H) 1999    Non Q wave MI, RCA     Obesity      Other asplenic status      Prediabetes      Sleep apnea      Urinary frequency        PHYSICAL EXAM:  BP (!) 164/96 (BP Location: Left arm, Patient Position: Sitting, Cuff Size: Adult Regular)   Pulse 64   Temp 98.1  F (36.7  C) (Oral)   Resp 12   Wt 126.6 kg (279 lb)   SpO2 97%   BMI 41.20 kg/m    GENERAL: no acute distress. Cooperative in conversation. Here alone  RESP: lungs are clear bilaterally per auscultation. Regular respiratory rate. No wheezes or rhonchi.  CV: Regular, rate and rhythm. No murmurs.  MUSCULOSKELETAL: No lower extremity swelling.   NEURO: non focal. Alert and oriented x3.   PSYCH: within normal limits. No depression or anxiety.  SKIN: warm dry intact   LYMPH: no cervical, supraclavicular  lymphadenopathy        Lab Results    Hospital Outpatient Visit on 12/20/2021   Component Date Value Ref Range Status     Creatinine POCT 12/20/2021 1.5* 0.7 - 1.3 mg/dL Final     GFR, ESTIMATED POCT 12/20/2021 49* >60 mL/min/1.73m2 Final   Lab on 12/20/2021   Component Date Value Ref Range Status     Sodium 12/20/2021 139  136 - 145 mmol/L Final     Potassium 12/20/2021 4.5  3.5 - 5.0 mmol/L Final     Chloride 12/20/2021 103  98 - 107 mmol/L Final     Carbon Dioxide (CO2) 12/20/2021 29  22 - 31 mmol/L Final     Anion Gap 12/20/2021 7  5 - 18 mmol/L Final     Urea Nitrogen 12/20/2021 36* 8 - 22 mg/dL Final     Creatinine 12/20/2021 1.46* 0.70 - 1.30 mg/dL Final     Calcium 12/20/2021 9.8  8.5 - 10.5 mg/dL Final     Glucose 12/20/2021 118  70 - 125 mg/dL  Final     Alkaline Phosphatase 12/20/2021 73  45 - 120 U/L Final     AST 12/20/2021 18  0 - 40 U/L Final     ALT 12/20/2021 26  0 - 45 U/L Final     Protein Total 12/20/2021 7.8  6.0 - 8.0 g/dL Final     Albumin 12/20/2021 3.8  3.5 - 5.0 g/dL Final     Bilirubin Total 12/20/2021 0.5  0.0 - 1.0 mg/dL Final     GFR Estimate 12/20/2021 50* >60 mL/min/1.73m2 Final    As of July 11, 2021, eGFR is calculated by the CKD-EPI creatinine equation, without race adjustment. eGFR can be influenced by muscle mass, exercise, and diet. The reported eGFR is an estimation only and is only applicable if the renal function is stable.     PSA Tumor Marker 12/20/2021 <0.10  0.00 - 4.50 ug/L Final     WBC Count 12/20/2021 5.8  4.0 - 11.0 10e3/uL Final    Preliminary ANC is 3.4     RBC Count 12/20/2021 4.08* 4.40 - 5.90 10e6/uL Final     Hemoglobin 12/20/2021 12.7* 13.3 - 17.7 g/dL Final     Hematocrit 12/20/2021 38.8* 40.0 - 53.0 % Final     MCV 12/20/2021 95  78 - 100 fL Final     MCH 12/20/2021 31.1  26.5 - 33.0 pg Final     MCHC 12/20/2021 32.7  31.5 - 36.5 g/dL Final     RDW 12/20/2021 13.8  10.0 - 15.0 % Final     Platelet Count 12/20/2021 384  150 - 450 10e3/uL Final     % Neutrophils 12/20/2021 59  % Final     % Lymphocytes 12/20/2021 19  % Final     % Monocytes 12/20/2021 13  % Final     % Eosinophils 12/20/2021 7  % Final     % Basophils 12/20/2021 1  % Final     % Immature Granulocytes 12/20/2021 1  % Final     NRBCs per 100 WBC 12/20/2021 0  <1 /100 Final     Absolute Neutrophils 12/20/2021 3.4  1.6 - 8.3 10e3/uL Final     Absolute Lymphocytes 12/20/2021 1.1  0.8 - 5.3 10e3/uL Final     Absolute Monocytes 12/20/2021 0.8  0.0 - 1.3 10e3/uL Final     Absolute Eosinophils 12/20/2021 0.4  0.0 - 0.7 10e3/uL Final     Absolute Basophils 12/20/2021 0.1  0.0 - 0.2 10e3/uL Final     Absolute Immature Granulocytes 12/20/2021 0.0  <=0.4 10e3/uL Final     Absolute NRBCs 12/20/2021 0.0  10e3/uL Final     Lab Results   Component Value Date     PSA <0.10 12/20/2021    PSA <0.1 06/21/2021    PSA <0.1 03/12/2021    PSA <0.1 12/16/2020    PSA <0.1 10/20/2020    PSA <0.1 04/14/2020    PSA 0.1 02/06/2020    PSA 0.8 01/08/2020    PSA 3.9 06/03/2019    PSA 3.2 09/10/2018   ]  Imaging    CT Chest/Abdomen/Pelvis w Contrast    Result Date: 12/20/2021  EXAM: CT CHEST/ABDOMEN/PELVIS W CONTRAST LOCATION: [facility] DATE/TIME: 12/20/2021 1:37 PM INDICATION:  Prostate cancer metastatic to intrapelvic lymph node (H), Prostate cancer metastatic to intrapelvic lymph node (H) COMPARISON: CT 11/14/2019. PET/CT 12/16/2020 TECHNIQUE: CT scan of the chest, abdomen, and pelvis was performed following injection of IV contrast. Multiplanar reformats were obtained. Dose reduction techniques were used. CONTRAST: 75ml isovue 370 FINDINGS: LUNGS AND PLEURA: Subtle 6 mm groundglass opacity lateral right upper lobe series 5 image 40 likely benign but has not been visualized with certainty on prior studies. Stable 2-3 mm nodules right lower lobe on image 109 compared to prior CT study, these should be benign. MEDIASTINUM/AXILLAE: Normal. CORONARY ARTERY CALCIFICATION: Moderate. HEPATOBILIARY: Slight hyperdensity within phrygian cap may relate to small localized stones. No inflammatory wall thickening. Bile ducts normal. Modest diffuse fatty infiltration of liver. PANCREAS: Normal. SPLEEN: Absent. ADRENAL GLANDS: Normal. KIDNEYS/BLADDER: Normal. BOWEL: Diverticulosis of the colon. No acute inflammatory change. No obstruction. LYMPH NODES: No lymphadenopathy. VASCULATURE: Unremarkable. PELVIC ORGANS: Diminutive prostate. MUSCULOSKELETAL: Sclerotic 1 cm focus posterior left iliac bone unchanged. No new bony lesions.     IMPRESSION: 1.  Stable exam. 1 cm sclerotic bony lesion left iliac bone unchanged. 2.  No new metastatic foci. No lymphadenopathy. 3.  Tiny nodules right lower lobe stable and should be benign. 6 mm groundglass nodule right upper lobe has not been  "seen previously but also favored to be benign.    Total time spent with patient in face to face time, chart review and documentation was 30 minutes.        Signed by: KAREN Arevalo CNP    Oncology Rooming Note    December 29, 2021 10:32 AM   Manas Martinez is a 61 year old male who presents for:    Chief Complaint   Patient presents with     Oncology Clinic Visit     Prostate cancer metastatic to intrapelvic lymph node (H)     Initial Vitals: BP (!) 164/96 (BP Location: Left arm, Patient Position: Sitting, Cuff Size: Adult Regular)   Pulse 64   Temp 98.1  F (36.7  C) (Oral)   Resp 12   Wt 126.6 kg (279 lb)   SpO2 97%   BMI 41.20 kg/m   Estimated body mass index is 41.2 kg/m  as calculated from the following:    Height as of 8/31/21: 1.753 m (5' 9\").    Weight as of this encounter: 126.6 kg (279 lb). Body surface area is 2.48 meters squared.  No Pain (0) Comment: Data Unavailable   No LMP for male patient.  Allergies reviewed: Yes  Medications reviewed: Yes    Medications: Medication refills not needed today.  Pharmacy name entered into [employer]:    [id number] - [place], [place] - [address] AT [facility] [address]  [facility] [place] [address] [facility] [place]  [facility] [place], [place] - [address] [id number] - [place], [place] - [address] AT [facility] [address]     Clinical concerns:  Prostate cancer metastatic to intrapelvic lymph node       Gabriela Joseph, [facility]                Again, thank you for allowing me to participate in the care of your patient.        Sincerely,        KAREN Arevalo CNP    "

## 2021-12-29 NOTE — PROGRESS NOTES
[facility] Hematology and Oncology Progress Note    Patient: Manas Martinez  MRN: [id number]  Date of Service: Dec 29, 2021          Reason for Visit    Chief Complaint   Patient presents with     Oncology Clinic Visit     Prostate cancer metastatic to intrapelvic lymph node (H)       Assessment and Plan    Cancer Staging  No matching staging information was found for the patient.    1. Prostate cancer, stage IVb with lymph node mets: pt is currently on Lupron. Was on apalutamide from January 2020 until July 2020. We stopped that due to rising cholesterol and hypertension. Pt did not want to start enzalutamide so has continued on lupron every 6 months. His psa wasn't really elevated at time of diagnosis, so not sure how sensitive of a marker that will be for us. That being said, his psa is currently undetectable. We will continue to monitor.  Per the radiation doctors recommendation at the [facility] he suggested doing the Lupron for 2 years.  He has now reached his 2-year srinivas so patient would like to stop that.  He feels comfortable with that plan.  He will monitor his PSA roughly every 3 months.  He will see Dr. Cordon in 1 year with a CT scan.     2. Adenoma: had adrenalectomy in March 2021. BP is much better and he is feeling good.      3. Left renal mass/cyst: looks to be exophytic cyst on CT. Will monitor on scans.     ECOG Performance    0 - Independent    Distress Screening (within last 30 days)    No data recorded     Pain  Pain Score: No Pain (0)    Problem List    Patient Active Problem List   Diagnosis     Allergic Rhinitis     Diffuse Joint Pains (Arthralgias)     Coronary artery disease due to lipid rich plaque     Secondary hypertension     Prediabetes     Asplenia after surgical procedure     KIERSTEN not on CPAP     Adrenal adenoma, right     Obesity (BMI 35.0-39.9) with comorbidity (H)     Benign prostatic hyperplasia with urinary frequency     RBBB     Prostate cancer metastatic to intrapelvic  lymph node (H)     CKD (chronic kidney disease) stage 4, GFR 15-29 ml/min (H)        ______________________________________________________________________________    History of Present Illness    Diagnosis:     1.  Metastatic prostate cancer: Biopsy of a retroperitoneal node in December 2019 showed adenocarcinoma.  CT scan showed adenopathy infolding the aortic bifurcation and mild pelvic adenopathy along the internal iliac chains bilaterally.  Bone scan at diagnosis was negative.  PET scan showed avid bilateral external iliac, left common iliac, aortocaval, and left retroperitoneal lymphadenopathy.  The most cephalad note is a 15 x 12 mm aortocaval node immediately anterior to L3.  Prostate MRI shows cancer involving almost the entire gland with broad capsular blurring without obvious evidence of extracapsular extension.  Prostate biopsy shows Rosa grade 5+4 = 9, the majority appearing to be 4+4 = 8.     Treatment:     Initially given Firmagon on December 11, 2019.     He received radiation to the prostate and pelvic nodes at the [facility].  Definitive radiotherapy to 70.2 Gy in 26 fractions of IMRT to the prostate, as well as treatment of the pelvic and para-aortic lymph nodes.  Treatment dates were June 8 through July 14, 2020.     Eligard q6 months and apalutamide started in January 2020.  Only took the apalutamide until July 2020.  Stopped due to hypertension and cholesterol issues     Interim History:    Patient is here today for 6-month follow-up visit.  Overall he says he is doing fine and has no new issues.  Denies any new bone or back pain.  Denies any weight loss.  States that he is feeling good.  He wants to get his results from his lab work as well as a CT scan and other than that is hoping to stop his Eligard.    Review of Systems    Pertinent items are noted in HPI.    Past History    Past Medical History:   Diagnosis Date     Adenocarcinoma of prostate (H)      Allergic rhinitis      Cancer (H)       Coronary artery disease      Deviated septum      Elevated PSA      Hyperlipemia      Hypertension      Joint pain      Myocardial infarction (H) 1999    Non Q wave MI, RCA     Obesity      Other asplenic status      Prediabetes      Sleep apnea      Urinary frequency        PHYSICAL EXAM:  BP (!) 164/96 (BP Location: Left arm, Patient Position: Sitting, Cuff Size: Adult Regular)   Pulse 64   Temp 98.1  F (36.7  C) (Oral)   Resp 12   Wt 126.6 kg (279 lb)   SpO2 97%   BMI 41.20 kg/m    GENERAL: no acute distress. Cooperative in conversation. Here alone  RESP: lungs are clear bilaterally per auscultation. Regular respiratory rate. No wheezes or rhonchi.  CV: Regular, rate and rhythm. No murmurs.  MUSCULOSKELETAL: No lower extremity swelling.   NEURO: non focal. Alert and oriented x3.   PSYCH: within normal limits. No depression or anxiety.  SKIN: warm dry intact   LYMPH: no cervical, supraclavicular  lymphadenopathy        Lab Results    Hospital Outpatient Visit on 12/20/2021   Component Date Value Ref Range Status     Creatinine POCT 12/20/2021 1.5* 0.7 - 1.3 mg/dL Final     GFR, ESTIMATED POCT 12/20/2021 49* >60 mL/min/1.73m2 Final   Lab on 12/20/2021   Component Date Value Ref Range Status     Sodium 12/20/2021 139  136 - 145 mmol/L Final     Potassium 12/20/2021 4.5  3.5 - 5.0 mmol/L Final     Chloride 12/20/2021 103  98 - 107 mmol/L Final     Carbon Dioxide (CO2) 12/20/2021 29  22 - 31 mmol/L Final     Anion Gap 12/20/2021 7  5 - 18 mmol/L Final     Urea Nitrogen 12/20/2021 36* 8 - 22 mg/dL Final     Creatinine 12/20/2021 1.46* 0.70 - 1.30 mg/dL Final     Calcium 12/20/2021 9.8  8.5 - 10.5 mg/dL Final     Glucose 12/20/2021 118  70 - 125 mg/dL Final     Alkaline Phosphatase 12/20/2021 73  45 - 120 U/L Final     AST 12/20/2021 18  0 - 40 U/L Final     ALT 12/20/2021 26  0 - 45 U/L Final     Protein Total 12/20/2021 7.8  6.0 - 8.0 g/dL Final     Albumin 12/20/2021 3.8  3.5 - 5.0 g/dL Final     Bilirubin  Total 12/20/2021 0.5  0.0 - 1.0 mg/dL Final     GFR Estimate 12/20/2021 50* >60 mL/min/1.73m2 Final    As of July 11, 2021, eGFR is calculated by the CKD-EPI creatinine equation, without race adjustment. eGFR can be influenced by muscle mass, exercise, and diet. The reported eGFR is an estimation only and is only applicable if the renal function is stable.     PSA Tumor Marker 12/20/2021 <0.10  0.00 - 4.50 ug/L Final     WBC Count 12/20/2021 5.8  4.0 - 11.0 10e3/uL Final    Preliminary ANC is 3.4     RBC Count 12/20/2021 4.08* 4.40 - 5.90 10e6/uL Final     Hemoglobin 12/20/2021 12.7* 13.3 - 17.7 g/dL Final     Hematocrit 12/20/2021 38.8* 40.0 - 53.0 % Final     MCV 12/20/2021 95  78 - 100 fL Final     MCH 12/20/2021 31.1  26.5 - 33.0 pg Final     MCHC 12/20/2021 32.7  31.5 - 36.5 g/dL Final     RDW 12/20/2021 13.8  10.0 - 15.0 % Final     Platelet Count 12/20/2021 384  150 - 450 10e3/uL Final     % Neutrophils 12/20/2021 59  % Final     % Lymphocytes 12/20/2021 19  % Final     % Monocytes 12/20/2021 13  % Final     % Eosinophils 12/20/2021 7  % Final     % Basophils 12/20/2021 1  % Final     % Immature Granulocytes 12/20/2021 1  % Final     NRBCs per 100 WBC 12/20/2021 0  <1 /100 Final     Absolute Neutrophils 12/20/2021 3.4  1.6 - 8.3 10e3/uL Final     Absolute Lymphocytes 12/20/2021 1.1  0.8 - 5.3 10e3/uL Final     Absolute Monocytes 12/20/2021 0.8  0.0 - 1.3 10e3/uL Final     Absolute Eosinophils 12/20/2021 0.4  0.0 - 0.7 10e3/uL Final     Absolute Basophils 12/20/2021 0.1  0.0 - 0.2 10e3/uL Final     Absolute Immature Granulocytes 12/20/2021 0.0  <=0.4 10e3/uL Final     Absolute NRBCs 12/20/2021 0.0  10e3/uL Final     Lab Results   Component Value Date    PSA <0.10 12/20/2021    PSA <0.1 06/21/2021    PSA <0.1 03/12/2021    PSA <0.1 12/16/2020    PSA <0.1 10/20/2020    PSA <0.1 04/14/2020    PSA 0.1 02/06/2020    PSA 0.8 01/08/2020    PSA 3.9 06/03/2019    PSA 3.2 09/10/2018   ]  Imaging    CT  Chest/Abdomen/Pelvis w Contrast    Result Date: 12/20/2021  EXAM: CT CHEST/ABDOMEN/PELVIS W CONTRAST LOCATION: [facility] DATE/TIME: 12/20/2021 1:37 PM INDICATION:  Prostate cancer metastatic to intrapelvic lymph node (H), Prostate cancer metastatic to intrapelvic lymph node (H) COMPARISON: CT 11/14/2019. PET/CT 12/16/2020 TECHNIQUE: CT scan of the chest, abdomen, and pelvis was performed following injection of IV contrast. Multiplanar reformats were obtained. Dose reduction techniques were used. CONTRAST: 75ml isovue 370 FINDINGS: LUNGS AND PLEURA: Subtle 6 mm groundglass opacity lateral right upper lobe series 5 image 40 likely benign but has not been visualized with certainty on prior studies. Stable 2-3 mm nodules right lower lobe on image 109 compared to prior CT study, these should be benign. MEDIASTINUM/AXILLAE: Normal. CORONARY ARTERY CALCIFICATION: Moderate. HEPATOBILIARY: Slight hyperdensity within phrygian cap may relate to small localized stones. No inflammatory wall thickening. Bile ducts normal. Modest diffuse fatty infiltration of liver. PANCREAS: Normal. SPLEEN: Absent. ADRENAL GLANDS: Normal. KIDNEYS/BLADDER: Normal. BOWEL: Diverticulosis of the colon. No acute inflammatory change. No obstruction. LYMPH NODES: No lymphadenopathy. VASCULATURE: Unremarkable. PELVIC ORGANS: Diminutive prostate. MUSCULOSKELETAL: Sclerotic 1 cm focus posterior left iliac bone unchanged. No new bony lesions.     IMPRESSION: 1.  Stable exam. 1 cm sclerotic bony lesion left iliac bone unchanged. 2.  No new metastatic foci. No lymphadenopathy. 3.  Tiny nodules right lower lobe stable and should be benign. 6 mm groundglass nodule right upper lobe has not been seen previously but also favored to be benign.    Total time spent with patient in face to face time, chart review and documentation was 30 minutes.        Signed by: KAREN Arevalo CNP

## 2021-12-29 NOTE — PROGRESS NOTES
"Oncology Rooming Note    December 29, 2021 10:32 AM   Manas Martinez is a 61 year old male who presents for:    Chief Complaint   Patient presents with     Oncology Clinic Visit     Prostate cancer metastatic to intrapelvic lymph node (H)     Initial Vitals: BP (!) 164/96 (BP Location: Left arm, Patient Position: Sitting, Cuff Size: Adult Regular)   Pulse 64   Temp 98.1  F (36.7  C) (Oral)   Resp 12   Wt 126.6 kg (279 lb)   SpO2 97%   BMI 41.20 kg/m   Estimated body mass index is 41.2 kg/m  as calculated from the following:    Height as of 8/31/21: 1.753 m (5' 9\").    Weight as of this encounter: 126.6 kg (279 lb). Body surface area is 2.48 meters squared.  No Pain (0) Comment: Data Unavailable   No LMP for male patient.  Allergies reviewed: Yes  Medications reviewed: Yes    Medications: Medication refills not needed today.  Pharmacy name entered into [facility]:    [id number] - [place], [place] - [address] AT [facility] [address]  [facility] [place] [address] [facility]  [facility] [place], [place] - [address] [id number] - [place], [place] - [address] AT [facility] [address]     Clinical concerns:  Prostate cancer metastatic to intrapelvic lymph node       Gabriela Joseph, [facility]            "

## 2022-04-29 ENCOUNTER — LAB (OUTPATIENT)
Dept: LAB | Facility: HOSPITAL | Age: 62
End: 2022-04-29
Payer: COMMERCIAL

## 2022-04-29 DIAGNOSIS — C77.5 PROSTATE CANCER METASTATIC TO INTRAPELVIC LYMPH NODE (H): ICD-10-CM

## 2022-04-29 DIAGNOSIS — C61 PROSTATE CANCER METASTATIC TO INTRAPELVIC LYMPH NODE (H): ICD-10-CM

## 2022-04-29 LAB
ALBUMIN SERPL-MCNC: 3.8 G/DL (ref 3.5–5)
ALP SERPL-CCNC: 71 U/L (ref 45–120)
ALT SERPL W P-5'-P-CCNC: 40 U/L (ref 0–45)
ANION GAP SERPL CALCULATED.3IONS-SCNC: 11 MMOL/L (ref 5–18)
AST SERPL W P-5'-P-CCNC: 22 U/L (ref 0–40)
BASOPHILS # BLD AUTO: 0.1 10E3/UL (ref 0–0.2)
BASOPHILS NFR BLD AUTO: 1 %
BILIRUB SERPL-MCNC: 0.4 MG/DL (ref 0–1)
BUN SERPL-MCNC: 27 MG/DL (ref 8–22)
CALCIUM SERPL-MCNC: 9.6 MG/DL (ref 8.5–10.5)
CHLORIDE BLD-SCNC: 103 MMOL/L (ref 98–107)
CO2 SERPL-SCNC: 25 MMOL/L (ref 22–31)
CREAT SERPL-MCNC: 1.36 MG/DL (ref 0.7–1.3)
EOSINOPHIL # BLD AUTO: 0.5 10E3/UL (ref 0–0.7)
EOSINOPHIL NFR BLD AUTO: 8 %
ERYTHROCYTE [DISTWIDTH] IN BLOOD BY AUTOMATED COUNT: 13.5 % (ref 10–15)
GFR SERPL CREATININE-BSD FRML MDRD: 58 ML/MIN/1.73M2
GLUCOSE BLD-MCNC: 115 MG/DL (ref 70–125)
HCT VFR BLD AUTO: 40 % (ref 40–53)
HGB BLD-MCNC: 13.1 G/DL (ref 13.3–17.7)
IMM GRANULOCYTES # BLD: 0 10E3/UL
IMM GRANULOCYTES NFR BLD: 0 %
LYMPHOCYTES # BLD AUTO: 1.3 10E3/UL (ref 0.8–5.3)
LYMPHOCYTES NFR BLD AUTO: 21 %
MCH RBC QN AUTO: 31.2 PG (ref 26.5–33)
MCHC RBC AUTO-ENTMCNC: 32.8 G/DL (ref 31.5–36.5)
MCV RBC AUTO: 95 FL (ref 78–100)
MONOCYTES # BLD AUTO: 0.8 10E3/UL (ref 0–1.3)
MONOCYTES NFR BLD AUTO: 13 %
NEUTROPHILS # BLD AUTO: 3.4 10E3/UL (ref 1.6–8.3)
NEUTROPHILS NFR BLD AUTO: 57 %
NRBC # BLD AUTO: 0 10E3/UL
NRBC BLD AUTO-RTO: 0 /100
PLATELET # BLD AUTO: 360 10E3/UL (ref 150–450)
POTASSIUM BLD-SCNC: 4.3 MMOL/L (ref 3.5–5)
PROT SERPL-MCNC: 7.4 G/DL (ref 6–8)
PSA SERPL-MCNC: <0.1 UG/L (ref 0–4.5)
RBC # BLD AUTO: 4.2 10E6/UL (ref 4.4–5.9)
SODIUM SERPL-SCNC: 139 MMOL/L (ref 136–145)
WBC # BLD AUTO: 6.1 10E3/UL (ref 4–11)

## 2022-04-29 PROCEDURE — 80053 COMPREHEN METABOLIC PANEL: CPT

## 2022-04-29 PROCEDURE — 85025 COMPLETE CBC W/AUTO DIFF WBC: CPT

## 2022-04-29 PROCEDURE — 84153 ASSAY OF PSA TOTAL: CPT

## 2022-04-29 PROCEDURE — 36415 COLL VENOUS BLD VENIPUNCTURE: CPT

## 2022-06-15 ENCOUNTER — OFFICE VISIT (OUTPATIENT)
Dept: INTERNAL MEDICINE | Facility: CLINIC | Age: 62
End: 2022-06-15
Payer: COMMERCIAL

## 2022-06-15 VITALS
HEART RATE: 73 BPM | RESPIRATION RATE: 18 BRPM | WEIGHT: 279.9 LBS | OXYGEN SATURATION: 96 % | SYSTOLIC BLOOD PRESSURE: 138 MMHG | BODY MASS INDEX: 41.33 KG/M2 | DIASTOLIC BLOOD PRESSURE: 82 MMHG

## 2022-06-15 DIAGNOSIS — N18.4 CKD (CHRONIC KIDNEY DISEASE) STAGE 4, GFR 15-29 ML/MIN (H): ICD-10-CM

## 2022-06-15 DIAGNOSIS — C77.5 PROSTATE CANCER METASTATIC TO INTRAPELVIC LYMPH NODE (H): ICD-10-CM

## 2022-06-15 DIAGNOSIS — I25.83 CORONARY ARTERY DISEASE DUE TO LIPID RICH PLAQUE: ICD-10-CM

## 2022-06-15 DIAGNOSIS — C61 PROSTATE CANCER METASTATIC TO INTRAPELVIC LYMPH NODE (H): ICD-10-CM

## 2022-06-15 DIAGNOSIS — I25.10 CORONARY ARTERY DISEASE DUE TO LIPID RICH PLAQUE: ICD-10-CM

## 2022-06-15 DIAGNOSIS — J32.0 CHRONIC MAXILLARY SINUSITIS: Primary | ICD-10-CM

## 2022-06-15 DIAGNOSIS — I10 ESSENTIAL HYPERTENSION: ICD-10-CM

## 2022-06-15 PROBLEM — E26.9 HYPERALDOSTERONISM (H): Status: ACTIVE | Noted: 2020-07-07

## 2022-06-15 PROBLEM — R80.0 ISOLATED PROTEINURIA: Status: ACTIVE | Noted: 2019-12-09

## 2022-06-15 PROBLEM — E78.5 DYSLIPIDEMIA: Status: ACTIVE | Noted: 2019-12-09

## 2022-06-15 PROBLEM — N28.89 RENAL MASS: Status: ACTIVE | Noted: 2019-12-09

## 2022-06-15 PROCEDURE — 99214 OFFICE O/P EST MOD 30 MIN: CPT | Performed by: INTERNAL MEDICINE

## 2022-06-15 RX ORDER — CARVEDILOL 25 MG/1
25 TABLET ORAL 2 TIMES DAILY WITH MEALS
Qty: 180 TABLET | Refills: 3 | Status: SHIPPED | OUTPATIENT
Start: 2022-06-15 | End: 2023-07-17

## 2022-06-15 ASSESSMENT — PAIN SCALES - GENERAL: PAINLEVEL: NO PAIN (0)

## 2022-06-15 ASSESSMENT — ASTHMA QUESTIONNAIRES: ACT_TOTALSCORE: 25

## 2022-06-15 NOTE — PROGRESS NOTES
Office Visit - Follow Up   Manas Martinez   62 year old male    Date of Visit: 6/15/2022    Chief Complaint   Patient presents with     Recheck Medication     Sinus Problem     Symptoms are worse     Blood Draw     fasting        Assessment and Plan   1. Chronic maxillary sinusitis  He will let me know if its not improved, may need additional imaging and ENT referral  - amoxicillin-clavulanate (AUGMENTIN) 875-125 MG tablet; Take 1 tablet by mouth 2 times daily for 14 days  Dispense: 28 tablet; Refill: 0    2. Essential hypertension  Blood pressures have been trending a little bit high.  Increase carvedilol, continue chlorthalidone and doxazosin, follow-up with me in 3 months  - carvedilol (COREG) 25 MG tablet; Take 1 tablet (25 mg) by mouth 2 times daily (with meals)  Dispense: 180 tablet; Refill: 3    3. Prostate cancer metastatic to intrapelvic lymph node (H)  Reviewed    4. Coronary artery disease due to lipid rich plaque  He has not been taking aspirin or statin and have encouraged him to resume    5. CKD (chronic kidney disease) stage 4, GFR 15-29 ml/min (H)  Recent kidney function looks okay, excellent    Return in about 3 months (around 9/15/2022).     History of Present Illness   This 62 year old man comes in for follow-up.  He has been having some sinus symptoms.  He reports many years of sinus congestion.  Last visit I recommended he try some allergy medication which has not been helpful.  He reports he had surgery in the past.  It looks like he had a CT back in 2012 but I cannot see the results.  He has been having many months of congestion and is now having tooth pain.  No fever or chills.  Reviewed recent labs from oncology, PSA undetectable.  Not on any current therapy directed to prostate cancer.  Blood pressures have been a little bit high    Review of Systems: A comprehensive review of systems was negative except as noted.     Medications, Allergies and Problem List   Reviewed, reconciled and  updated  Post Discharge Medication Reconciliation Status:   Social History     Social History Narrative    Lives with his wife, Cary.  Retired , [employer].  No biological children.  Three step children.  Fourth step child  from leukemia.        Physical Exam   General Appearance:   No acute distress    /82   Pulse 73   Resp 18   Wt 127 kg (279 lb 14.4 oz)   SpO2 96%   BMI 41.33 kg/m      Recheck of his blood pressure okay     Additional Information   Current Outpatient Medications   Medication Sig Dispense Refill     amoxicillin-clavulanate (AUGMENTIN) 875-125 MG tablet Take 1 tablet by mouth 2 times daily for 14 days 28 tablet 0     Ascorbic Acid (VITAMIN C PO) Take 1,000 mg by mouth daily       carvedilol (COREG) 25 MG tablet Take 1 tablet (25 mg) by mouth 2 times daily (with meals) 180 tablet 3     chlorthalidone (HYGROTON) 25 MG tablet Take 0.5 tablets (12.5 mg) by mouth daily 90 tablet 3     cholecalciferol, vitamin D3, 50 mcg (2,000 unit) Tab [CHOLECALCIFEROL, VITAMIN D3, 50 MCG (2,000 UNIT) TAB] Take 50 mcg by mouth.       doxazosin (CARDURA) 1 MG tablet Take 1 tablet (1 mg) by mouth daily 90 tablet 3     fluticasone (FLONASE) 50 MCG/ACT nasal spray Spray 1 spray into both nostrils daily 16 g 3     Zinc 50 MG CAPS Take by mouth daily       aspirin (ASA) 81 MG EC tablet Take 1 tablet (81 mg) by mouth daily       simvastatin (ZOCOR) 40 MG tablet Take 1 tablet (40 mg) by mouth At Bedtime 90 tablet 2     Allergies   Allergen Reactions     Atorvastatin Other (See Comments)     Leg Cramps     Social History     Tobacco Use     Smoking status: Former Smoker     Packs/day: 1.00     Years: 25.00     Pack years: 25.00     Types: Cigarettes, Cigarettes     Quit date: 1999     Years since quittin.4     Smokeless tobacco: Never Used   Substance Use Topics     Alcohol use: Yes     Alcohol/week: 10.0 standard drinks     Drug use: No       Review and/or order of clinical lab tests:  Review and/or  order of radiology tests:  Review and/or order of medicine tests:  Discussion of test results with performing physician:  Decision to obtain old records and/or obtain history from someone other than the patient:  Review and summarization of old records and/or obtaining history from someone other than the patient and.or discussion of case with another health care provider:  Independent visualization of image, tracing or specimen itself:    Time:      Waldo Kerr MD    Answers for HPI/ROS submitted by the patient on 6/12/2022  What is the reason for your visit today? : Sinusitis  How many servings of fruits and vegetables do you eat daily?: 2-3  On average, how many sweetened beverages do you drink each day (Examples: soda, juice, sweet tea, etc.  Do NOT count diet or artificially sweetened beverages)?: 0  How many minutes a day do you exercise enough to make your heart beat faster?: 9 or less  How many days a week do you exercise enough to make your heart beat faster?: 3 or less  How many days per week do you miss taking your medication?: 0

## 2022-07-25 ENCOUNTER — MYC MEDICAL ADVICE (OUTPATIENT)
Dept: INTERNAL MEDICINE | Facility: CLINIC | Age: 62
End: 2022-07-25

## 2022-07-25 DIAGNOSIS — J32.0 CHRONIC MAXILLARY SINUSITIS: Primary | ICD-10-CM

## 2022-08-28 ASSESSMENT — ENCOUNTER SYMPTOMS
ABDOMINAL PAIN: 0
MYALGIAS: 0
COUGH: 0
EYE PAIN: 0
HEMATURIA: 0
PARESTHESIAS: 0
FEVER: 0
HEARTBURN: 0
CONSTIPATION: 0
DIZZINESS: 0
CHILLS: 0
NAUSEA: 0
SHORTNESS OF BREATH: 0
DIARRHEA: 0
ARTHRALGIAS: 0
FREQUENCY: 1
HEMATOCHEZIA: 0
NERVOUS/ANXIOUS: 0
PALPITATIONS: 0
HEADACHES: 0
DYSURIA: 0
JOINT SWELLING: 0
WEAKNESS: 0
SORE THROAT: 0

## 2022-08-31 ENCOUNTER — OFFICE VISIT (OUTPATIENT)
Dept: INTERNAL MEDICINE | Facility: CLINIC | Age: 62
End: 2022-08-31
Payer: COMMERCIAL

## 2022-08-31 VITALS
OXYGEN SATURATION: 98 % | RESPIRATION RATE: 24 BRPM | DIASTOLIC BLOOD PRESSURE: 78 MMHG | HEART RATE: 59 BPM | BODY MASS INDEX: 41.29 KG/M2 | SYSTOLIC BLOOD PRESSURE: 128 MMHG | WEIGHT: 278.8 LBS | HEIGHT: 69 IN

## 2022-08-31 DIAGNOSIS — N40.1 BENIGN PROSTATIC HYPERPLASIA WITH URINARY FREQUENCY: ICD-10-CM

## 2022-08-31 DIAGNOSIS — E78.2 MIXED HYPERLIPIDEMIA: ICD-10-CM

## 2022-08-31 DIAGNOSIS — R23.2 HOT FLASHES: ICD-10-CM

## 2022-08-31 DIAGNOSIS — G47.33 OSA (OBSTRUCTIVE SLEEP APNEA): ICD-10-CM

## 2022-08-31 DIAGNOSIS — C61 PROSTATE CANCER METASTATIC TO INTRAPELVIC LYMPH NODE (H): ICD-10-CM

## 2022-08-31 DIAGNOSIS — Z12.11 SCREEN FOR COLON CANCER: ICD-10-CM

## 2022-08-31 DIAGNOSIS — Z00.00 ANNUAL PHYSICAL EXAM: Primary | ICD-10-CM

## 2022-08-31 DIAGNOSIS — N18.4 CKD (CHRONIC KIDNEY DISEASE) STAGE 4, GFR 15-29 ML/MIN (H): ICD-10-CM

## 2022-08-31 DIAGNOSIS — N28.1 CYST OF LEFT KIDNEY: ICD-10-CM

## 2022-08-31 DIAGNOSIS — I25.10 CORONARY ARTERY DISEASE INVOLVING NATIVE CORONARY ARTERY OF NATIVE HEART WITHOUT ANGINA PECTORIS: ICD-10-CM

## 2022-08-31 DIAGNOSIS — E26.9 HYPERALDOSTERONISM (H): ICD-10-CM

## 2022-08-31 DIAGNOSIS — D35.01 ADRENAL ADENOMA, RIGHT: ICD-10-CM

## 2022-08-31 DIAGNOSIS — C77.5 PROSTATE CANCER METASTATIC TO INTRAPELVIC LYMPH NODE (H): ICD-10-CM

## 2022-08-31 DIAGNOSIS — R35.0 BENIGN PROSTATIC HYPERPLASIA WITH URINARY FREQUENCY: ICD-10-CM

## 2022-08-31 DIAGNOSIS — I15.9 SECONDARY HYPERTENSION: ICD-10-CM

## 2022-08-31 DIAGNOSIS — R73.09 OTHER ABNORMAL GLUCOSE: ICD-10-CM

## 2022-08-31 DIAGNOSIS — Z90.81 ASPLENIA AFTER SURGICAL PROCEDURE: ICD-10-CM

## 2022-08-31 DIAGNOSIS — Z13.820 SCREENING FOR OSTEOPOROSIS: ICD-10-CM

## 2022-08-31 DIAGNOSIS — E66.01 SEVERE OBESITY (H): ICD-10-CM

## 2022-08-31 PROBLEM — E78.5 DYSLIPIDEMIA: Status: RESOLVED | Noted: 2019-12-09 | Resolved: 2022-08-31

## 2022-08-31 PROBLEM — R80.0 ISOLATED PROTEINURIA: Status: RESOLVED | Noted: 2019-12-09 | Resolved: 2022-08-31

## 2022-08-31 PROBLEM — N28.89 RENAL MASS: Status: RESOLVED | Noted: 2019-12-09 | Resolved: 2022-08-31

## 2022-08-31 LAB
ALBUMIN UR-MCNC: NEGATIVE MG/DL
APPEARANCE UR: CLEAR
BACTERIA #/AREA URNS HPF: ABNORMAL /HPF
BILIRUB UR QL STRIP: NEGATIVE
COLOR UR AUTO: YELLOW
CREAT UR-MCNC: 110 MG/DL
ERYTHROCYTE [DISTWIDTH] IN BLOOD BY AUTOMATED COUNT: 14.4 % (ref 10–15)
GLUCOSE UR STRIP-MCNC: NEGATIVE MG/DL
HBA1C MFR BLD: 6 % (ref 0–5.6)
HCT VFR BLD AUTO: 39.7 % (ref 40–53)
HGB BLD-MCNC: 12.9 G/DL (ref 13.3–17.7)
HGB UR QL STRIP: NEGATIVE
KETONES UR STRIP-MCNC: NEGATIVE MG/DL
LEUKOCYTE ESTERASE UR QL STRIP: NEGATIVE
MCH RBC QN AUTO: 30.3 PG (ref 26.5–33)
MCHC RBC AUTO-ENTMCNC: 32.5 G/DL (ref 31.5–36.5)
MCV RBC AUTO: 93 FL (ref 78–100)
MICROALBUMIN UR-MCNC: <12 MG/L
MICROALBUMIN/CREAT UR: NORMAL MG/G{CREAT}
NITRATE UR QL: NEGATIVE
PH UR STRIP: 6 [PH] (ref 5–8)
PHOSPHATE SERPL-MCNC: 3.8 MG/DL (ref 2.5–4.5)
PLATELET # BLD AUTO: 356 10E3/UL (ref 150–450)
PSA SERPL-MCNC: 0.27 NG/ML (ref 0–4.5)
PTH-INTACT SERPL-MCNC: 27 PG/ML (ref 15–65)
RBC # BLD AUTO: 4.26 10E6/UL (ref 4.4–5.9)
RBC #/AREA URNS AUTO: ABNORMAL /HPF
SP GR UR STRIP: 1.02 (ref 1–1.03)
SQUAMOUS #/AREA URNS AUTO: ABNORMAL /LPF
UROBILINOGEN UR STRIP-ACNC: 0.2 E.U./DL
WBC # BLD AUTO: 5.5 10E3/UL (ref 4–11)
WBC #/AREA URNS AUTO: ABNORMAL /HPF

## 2022-08-31 PROCEDURE — 80053 COMPREHEN METABOLIC PANEL: CPT | Performed by: INTERNAL MEDICINE

## 2022-08-31 PROCEDURE — 99396 PREV VISIT EST AGE 40-64: CPT | Mod: 25 | Performed by: INTERNAL MEDICINE

## 2022-08-31 PROCEDURE — 36415 COLL VENOUS BLD VENIPUNCTURE: CPT | Performed by: INTERNAL MEDICINE

## 2022-08-31 PROCEDURE — 90677 PCV20 VACCINE IM: CPT | Performed by: INTERNAL MEDICINE

## 2022-08-31 PROCEDURE — 84100 ASSAY OF PHOSPHORUS: CPT | Performed by: INTERNAL MEDICINE

## 2022-08-31 PROCEDURE — 85027 COMPLETE CBC AUTOMATED: CPT | Performed by: INTERNAL MEDICINE

## 2022-08-31 PROCEDURE — 82043 UR ALBUMIN QUANTITATIVE: CPT | Performed by: INTERNAL MEDICINE

## 2022-08-31 PROCEDURE — 90471 IMMUNIZATION ADMIN: CPT | Performed by: INTERNAL MEDICINE

## 2022-08-31 PROCEDURE — 99214 OFFICE O/P EST MOD 30 MIN: CPT | Mod: 25 | Performed by: INTERNAL MEDICINE

## 2022-08-31 PROCEDURE — 81001 URINALYSIS AUTO W/SCOPE: CPT | Performed by: INTERNAL MEDICINE

## 2022-08-31 PROCEDURE — 84153 ASSAY OF PSA TOTAL: CPT | Performed by: INTERNAL MEDICINE

## 2022-08-31 PROCEDURE — 83970 ASSAY OF PARATHORMONE: CPT | Performed by: INTERNAL MEDICINE

## 2022-08-31 PROCEDURE — 90472 IMMUNIZATION ADMIN EACH ADD: CPT | Performed by: INTERNAL MEDICINE

## 2022-08-31 PROCEDURE — 84403 ASSAY OF TOTAL TESTOSTERONE: CPT | Performed by: INTERNAL MEDICINE

## 2022-08-31 PROCEDURE — 83036 HEMOGLOBIN GLYCOSYLATED A1C: CPT | Performed by: INTERNAL MEDICINE

## 2022-08-31 PROCEDURE — 90682 RIV4 VACC RECOMBINANT DNA IM: CPT | Performed by: INTERNAL MEDICINE

## 2022-08-31 ASSESSMENT — PAIN SCALES - GENERAL: PAINLEVEL: NO PAIN (0)

## 2022-08-31 NOTE — PROGRESS NOTES
Office Visit - Physical   Manas Martinez   62 year old  male    Date of visit: 8/31/2022  Physician: Waldo Kerr MD     Assessment and Plan   1. Annual physical exam  This is a 62-year-old man with issues as discussed below    2. Screen for colon cancer  - NILS([employer])    3. CKD (chronic kidney disease) stage 4, GFR 15-29 ml/min (H)  Avoid nephrotoxic medication, ideally would benefit from an ARB or ACE inhibitor for renal protection, but not interested in changing antihypertensive medications at this time  - Parathyroid Hormone Intact; Future  - Phosphorus; Future  - Albumin Random Urine Quantitative with Creat Ratio; Future  - Comprehensive metabolic panel; Future  - CBC with platelets; Future  - UA with Microscopic reflex to Culture - lab collect; Future  - Parathyroid Hormone Intact  - Phosphorus  - Albumin Random Urine Quantitative with Creat Ratio  - Comprehensive metabolic panel  - CBC with platelets  - UA with Microscopic reflex to Culture - lab collect  - Urine Microscopic    4. Coronary artery disease involving native coronary artery of native heart without angina pectoris  Continue with secondary prevention    5. Mixed hyperlipidemia  Discussed increasing intensity of statin but has been intolerant of others in the past and prefers to continue with simvastatin    6. Secondary hypertension  7. Hyperaldosteronism (H)  8. Adrenal adenoma, right, s/p adrenalectomy  Blood pressure much better controlled since adrenalectomy    9. Prostate cancer metastatic to intrapelvic lymph node (H)  10. Hot flashes  Follows at [facility], continues to have hot flashes although he is out some time from hormone suppression.  Check labs as below as well as bone density  - Testosterone, total; Future  - PSA, tumor marker; Future  - DX Hip/Pelvis/Spine; Future  - Testosterone, total  - PSA, tumor marker    11. Screening for osteoporosis  - DX Hip/Pelvis/Spine; Future    12. Prediabetes  Check lab  -  Hemoglobin A1c; Future  - Hemoglobin A1c    13. KIERSTEN not on CPAP    14. Benign prostatic hyperplasia with urinary frequency  Doing okay he is on doxazosin    15. Asplenia after surgical procedure  Recommend meningococcal vaccine, type B to complete the series which she declines.  He is open to getting pneumococcal vaccine today    16. Cyst of left kidney - monitored at [place]  Needs periodic imaging, has been followed at [facility].  Discussed imaging today but he declined    17. Severe obesity (H)  Discussed importance of reduction in calories, carbohydrates, increasing physical activity and modest weight loss    Return in about 3 months (around 2022) for Follow up.     Chief Complaint   Chief Complaint   Patient presents with     Recheck Medication     Physical     Fasting for labs        Patient Profile   Social History     Social History Narrative    Lives with his wife, Cary.  Retired , [employer].  No biological children.  Three step children.  Fourth step child  from leukemia.        Past Medical History   Patient Active Problem List   Diagnosis     CAD, MI ~, PALLAVI RCA     Secondary hypertension     Prediabetes     Asplenia after surgical procedure     KIERSTEN not on CPAP     Adrenal adenoma, right, s/p adrenalectomy     Severe obesity (H)     Benign prostatic hyperplasia with urinary frequency     RBBB     Prostate cancer metastatic to intrapelvic lymph node (H)     CKD (chronic kidney disease) stage 4, GFR 15-29 ml/min (H)     Hyperaldosteronism (H)     Mixed hyperlipidemia     Cyst of left kidney - monitored at [place]       Past Surgical History  He has a past surgical history that includes REMOVAL SPLEEN, TOTAL (1972); Coronary Stent Placement (1999); Cardiac catheterization (1999); [place] Tooth Extraction; Repair tendon finger(s) (Left); Repair tendon biceps distal upper extremity (Left); other surgical history; Ct Biopsy Retroperitoneal Mass (2019); and Adrenalectomy  (Right).     History of Present Illness   This 62 year old man comes in for annual physical and follow-up of numerous medical issues.  Overall he has been doing okay.  His blood pressures have generally been well controlled.  He has a history of an adrenal adenoma status postresection as it was previously in aldosterone.  His blood pressures have been much better controlled and he likely has a component of primary hypertension as well.  He has been treated for prostate cancer with hormone suppression as well as radiation and has not been on these medications for some time but continues to have hot flashes.  He saw [facility] and they recommended a testosterone check along with a PSA and we reviewed this visit.  They are not recommending testosterone replacement if it is low.  He has a history of a left renal cyst which has been followed periodically with imaging.  He has a history of coronary heart disease with intervention around 1998 or 1999 with drug-eluting stent to the right coronary artery and thrombectomy.  He is on simvastatin and is intolerant of other statins.  He is no longer smoking.  He has some chronic kidney disease which has been stable.  History of asplenia after an accident around age 11.  He is due for pneumonia vaccine and has not yet completed his meningococcal vaccination series.    Review of Systems: A comprehensive review of systems was negative except as noted.     Medications and Allergies   Current Outpatient Medications   Medication Sig Dispense Refill     Ascorbic Acid (VITAMIN C PO) Take 1,000 mg by mouth daily       aspirin (ASA) 81 MG EC tablet Take 1 tablet (81 mg) by mouth daily       carvedilol (COREG) 25 MG tablet Take 1 tablet (25 mg) by mouth 2 times daily (with meals) 180 tablet 3     chlorthalidone (HYGROTON) 25 MG tablet Take 0.5 tablets (12.5 mg) by mouth daily 90 tablet 3     cholecalciferol, vitamin D3, 50 mcg (2,000 unit) Tab [CHOLECALCIFEROL, VITAMIN D3, 50 MCG (2,000  "UNIT) TAB] Take 50 mcg by mouth.       doxazosin (CARDURA) 1 MG tablet Take 1 tablet (1 mg) by mouth daily 90 tablet 3     fluticasone (FLONASE) 50 MCG/ACT nasal spray Spray 1 spray into both nostrils daily 16 g 3     simvastatin (ZOCOR) 40 MG tablet Take 1 tablet (40 mg) by mouth At Bedtime 90 tablet 2     Zinc 50 MG CAPS Take by mouth daily       Allergies   Allergen Reactions     Atorvastatin Other (See Comments)     Leg Cramps        Family and Social History   Family History   Problem Relation Age of Onset     Hypertension Mother      Diabetes Mother      Chronic Obstructive Pulmonary Disease Mother      Hypertension Father      Diabetes Father      Prostate Cancer Father      Pancreatic Cancer Father      Hypertension Brother      Prostate Cancer Brother      Hypertension Brother      Hypertension Brother      Diabetes Paternal Grandmother      Diabetes Paternal Grandfather         Social History     Tobacco Use     Smoking status: Former Smoker     Packs/day: 1.00     Years: 25.00     Pack years: 25.00     Types: Cigarettes, Cigarettes     Quit date: 1999     Years since quittin.6     Smokeless tobacco: Never Used   Vaping Use     Vaping Use: Never used   Substance Use Topics     Alcohol use: Yes     Alcohol/week: 10.0 standard drinks     Drug use: No        Physical Exam   General Appearance:   No acute distress    /78   Pulse 59   Resp 24   Ht 1.753 m (5' 9\")   Wt 126.5 kg (278 lb 12.8 oz)   SpO2 98%   BMI 41.17 kg/m      EYES: Eyelids, conjunctiva, and sclera were normal. Pupils were normal. Cornea, iris, and lens were normal bilaterally.  HEAD, EARS, NOSE, MOUTH, AND THROAT: Head and face were normal. Hearing was normal to voice and the ears were normal to external exam.   NECK: Neck appearance was normal. There were no neck masses and the thyroid was not enlarged.  RESPIRATORY: Breathing pattern was normal and the chest moved symmetrically.  Percussion/auscultatory percussion was " normal.  Lung sounds were normal and there were no abnormal sounds.  CARDIOVASCULAR: Heart rate and rhythm were normal.  S1 and S2 were normal and there were no extra sounds or murmurs. Peripheral pulses in arms and legs were normal.  Jugular venous pressure was normal.  There was no peripheral edema.  GASTROINTESTINAL: The abdomen was normal in contour.  Bowel sounds were present.  Percussion detected no organ enlargement or tenderness.  Palpation detected no tenderness, mass, or enlarged organs.   MUSCULOSKELETAL: Skeletal configuration was normal and muscle mass was normal for age. Joint appearance was overall normal.  LYMPHATIC: There were no enlarged nodes.  SKIN/HAIR/NAILS: Skin color was normal.  There were no skin lesions.  Hair and nails were normal.  NEUROLOGIC: The patient was alert and oriented to person, place, time, and circumstance. Speech was normal. Cranial nerves were normal. Motor strength was normal for age. The patient was normally coordinated.  PSYCHIATRIC:  Mood and affect were normal and the patient had normal recent and remote memory. The patient's judgment and insight were normal.       Additional Information        Waldo Kerr MD  Internal Medicine  Contact me at [phone]  Answers for HPI/ROS submitted by the patient on 8/28/2022  Frequency of exercise:: 2-3 days/week  Getting at least 3 servings of Calcium per day:: Yes  Diet:: Regular (no restrictions)  Taking medications regularly:: Yes  Bi-annual eye exam:: NO  Dental care twice a year:: Yes  Sleep apnea or symptoms of sleep apnea:: Sleep apnea  abdominal pain: No  Blood in stool: No  Blood in urine: No  chest pain: No  chills: No  congestion: Yes  constipation: No  cough: No  diarrhea: No  dizziness: No  ear pain: No  eye pain: No  nervous/anxious: No  fever: No  frequency: Yes  genital sores: No  headaches: No  hearing loss: No  heartburn: No  arthralgias: No  joint swelling: No  peripheral edema: No  mood changes:  No  myalgias: No  nausea: No  dysuria: No  palpitations: No  Skin sensation changes: No  sore throat: No  urgency: Yes  rash: No  shortness of breath: No  visual disturbance: No  weakness: No  impotence: Yes  penile discharge: No  Additional concerns today:: No  Duration of exercise:: Less than 15 minutes

## 2022-09-01 LAB
ALBUMIN SERPL BCG-MCNC: 4.4 G/DL (ref 3.5–5.2)
ALP SERPL-CCNC: 66 U/L (ref 40–129)
ALT SERPL W P-5'-P-CCNC: 42 U/L (ref 10–50)
ANION GAP SERPL CALCULATED.3IONS-SCNC: 10 MMOL/L (ref 7–15)
AST SERPL W P-5'-P-CCNC: 28 U/L (ref 10–50)
BILIRUB SERPL-MCNC: 0.4 MG/DL
BUN SERPL-MCNC: 28.5 MG/DL (ref 8–23)
CALCIUM SERPL-MCNC: 10.2 MG/DL (ref 8.8–10.2)
CHLORIDE SERPL-SCNC: 103 MMOL/L (ref 98–107)
CREAT SERPL-MCNC: 1.24 MG/DL (ref 0.67–1.17)
DEPRECATED HCO3 PLAS-SCNC: 26 MMOL/L (ref 22–29)
GFR SERPL CREATININE-BSD FRML MDRD: 65 ML/MIN/1.73M2
GLUCOSE SERPL-MCNC: 131 MG/DL (ref 70–99)
POTASSIUM SERPL-SCNC: 4.3 MMOL/L (ref 3.4–5.3)
PROT SERPL-MCNC: 7.6 G/DL (ref 6.4–8.3)
SODIUM SERPL-SCNC: 139 MMOL/L (ref 136–145)

## 2022-09-02 LAB — TESTOST SERPL-MCNC: 16 NG/DL (ref 240–950)

## 2022-09-13 LAB — NONINV COLON CA DNA+OCC BLD SCRN STL QL: NEGATIVE

## 2022-11-03 ENCOUNTER — OFFICE VISIT (OUTPATIENT)
Dept: OTOLARYNGOLOGY | Facility: CLINIC | Age: 62
End: 2022-11-03
Payer: COMMERCIAL

## 2022-11-03 DIAGNOSIS — J32.0 CHRONIC MAXILLARY SINUSITIS: ICD-10-CM

## 2022-11-03 DIAGNOSIS — J33.9 NASAL POLYP: Primary | ICD-10-CM

## 2022-11-03 PROCEDURE — 99243 OFF/OP CNSLTJ NEW/EST LOW 30: CPT | Performed by: OTOLARYNGOLOGY

## 2022-11-03 NOTE — PROGRESS NOTES
HPI: This patient is a 63yo M who presents for evaluation of the sinuses at the request of Dr. Kerr. The patient reports a history of polyps and is s/p surgery maybe 7ish years ago. He cannot remember his surgeon, but sounds like it may have been with [facility]. Over the past several years, he has had worsening congestion and it does not respond to nasal steroid sprays. His sense of smell is diminished significantly, but he thinks it is a little better since they removed a cat from their home. There have not really been episodes of high fever, localized sinus pain, tooth pain, and pururlent drainage. Not really describing a lot of nasal congestion or breathing restriction. Has been on antibiotics and nasal steroids in the past without much improvement in the overall situation. Denies dental grinding/clenching.     Past medical history, surgical history, social history, family history, medications, and allergies have been reviewed with the patient and are documented above.    Review of Systems: a 10-system review was performed. Pertinent positives are noted in the HPI and on a separate scanned document in the chart.    PHYSICAL EXAMINATION:  GEN: no acute distress, normocephalic  EYES: extraocular movements are intact, pupils are equal and round. Sclera clear.   EARS: auricles are normally formed. The external auditory canals are clear with minimal to no cerumen. Tympanic membranes are intact bilaterally with no signs of infection, effusion, retractions, or perforations.  NOSE: anterior nares are patent. There are no masses or lesions. The septum is non-obstructing. No percussive tenderness over the maxillary or frontal sinuses. Inflammatory polyps appreciated L>R  OC/OP: clear, dentition is in good repair. The tongue and palate are fully mobile and symmetric. No masses or lesions.  NECK: soft and supple. No lymphadenopathy or masses. Airway is midline.   NEURO: CN VII and XII symmetric. alert and oriented. No  spontaneous nystagmus. Gait is normal.  PULM: breathing comfortably on room air, normal chest expansion with respiration  CARDS: no cyanosis or clubbing. Normal carotid pulses.    IMAGING: none recent    MEDICAL DECISION-MAKING: This patient is a 61yo M with recurrent nasal polyps. Discussed a new CT scan to assess his post-surgical anatomy and the extent of the recurrence. He is leaving to winter in [place] next week and will not return until spring. Given that this is a recurrence of polyps and he will be away for several months, I think Nucala would be a reasonable option for him. Will send in the Rx.

## 2022-11-03 NOTE — LETTER
11/3/2022         RE: Manas Martinez  [address]  [facility] [address]        Dear Colleague,    Thank you for referring your patient, Manas Martinez, to the [facility] [place]. Please see a copy of my visit note below.    HPI: This patient is a 63yo M who presents for evaluation of the sinuses at the request of Dr. Kerr. The patient reports a history of polyps and is s/p surgery maybe 7ish years ago. He cannot remember his surgeon, but sounds like it may have been with [facility]. Over the past several years, he has had worsening congestion and it does not respond to nasal steroid sprays. His sense of smell is diminished significantly, but he thinks it is a little better since they removed a cat from their home. There have not really been episodes of high fever, localized sinus pain, tooth pain, and pururlent drainage. Not really describing a lot of nasal congestion or breathing restriction. Has been on antibiotics and nasal steroids in the past without much improvement in the overall situation. Denies dental grinding/clenching.     Past medical history, surgical history, social history, family history, medications, and allergies have been reviewed with the patient and are documented above.    Review of Systems: a 10-system review was performed. Pertinent positives are noted in the HPI and on a separate scanned document in the chart.    PHYSICAL EXAMINATION:  GEN: no acute distress, normocephalic  EYES: extraocular movements are intact, pupils are equal and round. Sclera clear.   EARS: auricles are normally formed. The external auditory canals are clear with minimal to no cerumen. Tympanic membranes are intact bilaterally with no signs of infection, effusion, retractions, or perforations.  NOSE: anterior nares are patent. There are no masses or lesions. The septum is non-obstructing. No percussive tenderness over the maxillary or frontal sinuses. Inflammatory polyps appreciated  L>R  OC/OP: clear, dentition is in good repair. The tongue and palate are fully mobile and symmetric. No masses or lesions.  NECK: soft and supple. No lymphadenopathy or masses. Airway is midline.   NEURO: CN VII and XII symmetric. alert and oriented. No spontaneous nystagmus. Gait is normal.  PULM: breathing comfortably on room air, normal chest expansion with respiration  CARDS: no cyanosis or clubbing. Normal carotid pulses.    IMAGING: none recent    MEDICAL DECISION-MAKING: This patient is a 63yo M with recurrent nasal polyps. Discussed a new CT scan to assess his post-surgical anatomy and the extent of the recurrence. He is leaving to winter in [place] next week and will not return until spring. Given that this is a recurrence of polyps and he will be away for several months, I think Nucala would be a reasonable option for him. Will send in the Rx.          Again, thank you for allowing me to participate in the care of your patient.        Sincerely,        Gniny Dos Santos MD

## 2022-11-07 ENCOUNTER — TELEPHONE (OUTPATIENT)
Dept: OTOLARYNGOLOGY | Facility: CLINIC | Age: 62
End: 2022-11-07

## 2022-11-07 NOTE — TELEPHONE ENCOUNTER
[facility] Prior Authorization Team   Phone: [phone]  Fax: [phone]    PA Initiation    Medication: Nucala (PA Initiated)  Insurance Company: [employer] [facility] - Phone [phone] Fax [phone]  Pharmacy Filling the Rx:    Filling Pharmacy Phone: [phone]  Filling Pharmacy Fax: [phone]  Start Date: 11/7/2022

## 2022-11-08 NOTE — TELEPHONE ENCOUNTER
PRIOR AUTHORIZATION DENIED    Medication: Nucala (PA Denied)    Denial Date: 11/7/2022    Denial Rational: The indication use of this medication, as provided does not meet th [facility] Service Benefit Plan's criteria due to the following reason. The use of this medication without the prescriber assessing the patient for medical appropriateness for a varcella vaccination prior to the therapy does not establish medical necessity for this drug.    Appeal Information: [facility] Federal Employee Program               Phone: [phone]               Fax: [phone]

## 2022-11-25 ENCOUNTER — TELEPHONE (OUTPATIENT)
Dept: ONCOLOGY | Facility: HOSPITAL | Age: 62
End: 2022-11-25

## 2022-11-25 NOTE — TELEPHONE ENCOUNTER
Called patient to schedule his year follow up due in December patient is currently in [place] nad he will be there until may 2023. Patient stated he will call back once he is back in [place].   I extend patient recall to may 2023

## 2022-12-07 DIAGNOSIS — I10 ESSENTIAL HYPERTENSION: ICD-10-CM

## 2022-12-07 DIAGNOSIS — I15.9 SECONDARY HYPERTENSION: ICD-10-CM

## 2022-12-08 RX ORDER — DOXAZOSIN 1 MG/1
1 TABLET ORAL DAILY
Qty: 90 TABLET | Refills: 2 | Status: SHIPPED | OUTPATIENT
Start: 2022-12-08 | End: 2023-07-11

## 2022-12-08 RX ORDER — CHLORTHALIDONE 25 MG/1
12.5 TABLET ORAL DAILY
Qty: 45 TABLET | Refills: 2 | Status: SHIPPED | OUTPATIENT
Start: 2022-12-08 | End: 2023-07-11

## 2022-12-08 NOTE — TELEPHONE ENCOUNTER
"Outpatient Medication Detail     Disp Refills Start End KRYSTIAN   chlorthalidone (HYGROTON) 25 MG tablet 90 tablet 3 8/31/2021  No   Sig - Route: Take 0.5 tablets (12.5 mg) by mouth daily - Oral   Class: No Print Out   Order: [id number]     CLASS:  No print.  Routing to provider per protocol.    Routing refill request to provider for review/approval because:  Labs out of range:  Creatinine  A break in medication    Last Written Prescription Date:  11/4/21  Last Fill Quantity: 90,  # refills: 3   Last office visit provider:  8/31/22     Last Written Prescription Date:  6/30/21  Last Fill Quantity: 90,  # refills: 2   Last office visit provider:  8/31/22    Requested Prescriptions   Pending Prescriptions Disp Refills     doxazosin (CARDURA) 1 MG tablet 90 tablet 3     Sig: Take 1 tablet (1 mg) by mouth daily       Antiadrenergic Antihypertensives Failed - 12/8/2022  9:24 AM        Failed - Normal serum creatinine on file in past 12 months     Recent Labs   Lab Test 08/31/22  1039   CR 1.24*       Ok to refill medication if creatinine is low          Passed - Blood pressure less than 140/90 in past 6 months     BP Readings from Last 3 Encounters:   08/31/22 128/78   06/15/22 138/82   12/29/21 (!) 164/96                 Passed - Medication is active on med list        Passed - Patient is age 18 or older        Passed - Recent (6 mo) or future (30 days) visit within the authorizing provider's specialty     Patient had office visit in the last 6 months or has a visit in the next 30 days with authorizing provider or within the authorizing provider's specialty.  See \"Patient Info\" tab in inbasket, or \"Choose Columns\" in Meds & Orders section of the refill encounter.           Alpha Blockers Passed - 12/8/2022  9:24 AM        Passed - Blood pressure under 140/90 in past 12 months     BP Readings from Last 3 Encounters:   08/31/22 128/78   06/15/22 138/82   12/29/21 (!) 164/96                 Passed - Recent (12 mo) or future (30 " "days) visit within the authorizing provider's specialty     Patient has had an office visit with the authorizing provider or a provider within the authorizing providers department within the previous 12 mos or has a future within next 30 days. See \"Patient Info\" tab in inbasket, or \"Choose Columns\" in Meds & Orders section of the refill encounter.              Passed - Patient does not have Tadalafil, Vardenafil, or Sildenafil on their medication list        Passed - Medication is active on med list        Passed - Patient is 18 years of age or older           chlorthalidone (HYGROTON) 25 MG tablet 90 tablet 3     Sig: Take 0.5 tablets (12.5 mg) by mouth daily       Diuretics (Including Combos) Protocol Failed - 12/7/2022 10:26 AM        Failed - Normal serum creatinine on file in past 12 months     Recent Labs   Lab Test 08/31/22  1039   CR 1.24*              Passed - Blood pressure under 140/90 in past 12 months     BP Readings from Last 3 Encounters:   08/31/22 128/78   06/15/22 138/82   12/29/21 (!) 164/96                 Passed - Recent (12 mo) or future (30 days) visit within the authorizing provider's specialty     Patient has had an office visit with the authorizing provider or a provider within the authorizing providers department within the previous 12 mos or has a future within next 30 days. See \"Patient Info\" tab in inbasket, or \"Choose Columns\" in Meds & Orders section of the refill encounter.              Passed - Medication is active on med list        Passed - Patient is age 18 or older        Passed - Normal serum potassium on file in past 12 months     Recent Labs   Lab Test 08/31/22  1039   POTASSIUM 4.3                    Passed - Normal serum sodium on file in past 12 months     Recent Labs   Lab Test 08/31/22  1039                      Jorje Hussein RN 12/08/22 9:24 AM  "

## 2023-03-09 ENCOUNTER — MYC MEDICAL ADVICE (OUTPATIENT)
Dept: OTOLARYNGOLOGY | Facility: CLINIC | Age: 63
End: 2023-03-09
Payer: MEDICARE

## 2023-03-09 DIAGNOSIS — J33.9 NASAL POLYP: Primary | ICD-10-CM

## 2023-03-09 DIAGNOSIS — J32.0 CHRONIC MAXILLARY SINUSITIS: ICD-10-CM

## 2023-03-27 NOTE — CONFIDENTIAL NOTE
Per Dr. Powell [facility] an [facility] [employer].    [facility]      Megan Burks RN  [facility]  ENT  [address] [address]  [email or web address]   Office:[phone]  Employed by [facility]

## 2023-05-02 ENCOUNTER — HOSPITAL ENCOUNTER (OUTPATIENT)
Dept: CT IMAGING | Facility: HOSPITAL | Age: 63
Discharge: HOME | End: 2023-05-02
Attending: OTOLARYNGOLOGY
Payer: MEDICARE

## 2023-05-02 ENCOUNTER — HOSPITAL ENCOUNTER (OUTPATIENT)
Dept: BONE DENSITY | Facility: HOSPITAL | Age: 63
Discharge: HOME | End: 2023-05-02
Attending: INTERNAL MEDICINE
Payer: MEDICARE

## 2023-05-02 ENCOUNTER — HOSPITAL ENCOUNTER (OUTPATIENT)
Dept: CT IMAGING | Facility: HOSPITAL | Age: 63
Discharge: HOME | End: 2023-05-02
Attending: NURSE PRACTITIONER
Payer: MEDICARE

## 2023-05-02 DIAGNOSIS — C61 PROSTATE CANCER METASTATIC TO INTRAPELVIC LYMPH NODE (H): ICD-10-CM

## 2023-05-02 DIAGNOSIS — C77.5 PROSTATE CANCER METASTATIC TO INTRAPELVIC LYMPH NODE (H): ICD-10-CM

## 2023-05-02 DIAGNOSIS — J32.0 CHRONIC MAXILLARY SINUSITIS: ICD-10-CM

## 2023-05-02 DIAGNOSIS — Z13.820 SCREENING FOR OSTEOPOROSIS: ICD-10-CM

## 2023-05-02 DIAGNOSIS — J33.9 NASAL POLYP: ICD-10-CM

## 2023-05-02 LAB
CREAT BLD-MCNC: 1.6 MG/DL (ref 0.7–1.3)
GFR SERPL CREATININE-BSD FRML MDRD: 48 ML/MIN/1.73M2

## 2023-05-02 PROCEDURE — 77080 DXA BONE DENSITY AXIAL: CPT

## 2023-05-02 PROCEDURE — 250N000011 HC RX IP 250 OP 636: Performed by: NURSE PRACTITIONER

## 2023-05-02 PROCEDURE — 82565 ASSAY OF CREATININE: CPT

## 2023-05-02 PROCEDURE — 70486 CT MAXILLOFACIAL W/O DYE: CPT | Mod: MG

## 2023-05-02 PROCEDURE — 74177 CT ABD & PELVIS W/CONTRAST: CPT

## 2023-05-02 RX ORDER — IOPAMIDOL 755 MG/ML
67 INJECTION, SOLUTION INTRAVASCULAR ONCE
Status: COMPLETED | OUTPATIENT
Start: 2023-05-02 | End: 2023-05-02

## 2023-05-02 RX ADMIN — IOPAMIDOL 67 ML: 755 INJECTION, SOLUTION INTRAVENOUS at 11:54

## 2023-05-05 ENCOUNTER — OFFICE VISIT (OUTPATIENT)
Dept: INTERNAL MEDICINE | Facility: CLINIC | Age: 63
End: 2023-05-05
Payer: MEDICARE

## 2023-05-05 VITALS
TEMPERATURE: 98.6 F | BODY MASS INDEX: 43.69 KG/M2 | HEART RATE: 63 BPM | WEIGHT: 295 LBS | OXYGEN SATURATION: 98 % | HEIGHT: 69 IN | RESPIRATION RATE: 18 BRPM | DIASTOLIC BLOOD PRESSURE: 86 MMHG | SYSTOLIC BLOOD PRESSURE: 138 MMHG

## 2023-05-05 DIAGNOSIS — C77.5 PROSTATE CANCER METASTATIC TO INTRAPELVIC LYMPH NODE (H): ICD-10-CM

## 2023-05-05 DIAGNOSIS — I15.9 SECONDARY HYPERTENSION: ICD-10-CM

## 2023-05-05 DIAGNOSIS — I25.10 CORONARY ARTERY DISEASE INVOLVING NATIVE CORONARY ARTERY OF NATIVE HEART WITHOUT ANGINA PECTORIS: ICD-10-CM

## 2023-05-05 DIAGNOSIS — E66.01 SEVERE OBESITY (H): ICD-10-CM

## 2023-05-05 DIAGNOSIS — C61 PROSTATE CANCER METASTATIC TO INTRAPELVIC LYMPH NODE (H): ICD-10-CM

## 2023-05-05 DIAGNOSIS — R73.9 HYPERGLYCEMIA: Primary | ICD-10-CM

## 2023-05-05 DIAGNOSIS — E78.2 MIXED HYPERLIPIDEMIA: ICD-10-CM

## 2023-05-05 DIAGNOSIS — N18.4 CKD (CHRONIC KIDNEY DISEASE) STAGE 4, GFR 15-29 ML/MIN (H): ICD-10-CM

## 2023-05-05 LAB
ALBUMIN SERPL BCG-MCNC: 4.3 G/DL (ref 3.5–5.2)
ALBUMIN UR-MCNC: NEGATIVE MG/DL
ALP SERPL-CCNC: 66 U/L (ref 40–129)
ALT SERPL W P-5'-P-CCNC: 40 U/L (ref 10–50)
ANION GAP SERPL CALCULATED.3IONS-SCNC: 11 MMOL/L (ref 7–15)
APPEARANCE UR: CLEAR
AST SERPL W P-5'-P-CCNC: 25 U/L (ref 10–50)
BILIRUB SERPL-MCNC: 0.3 MG/DL
BILIRUB UR QL STRIP: NEGATIVE
BUN SERPL-MCNC: 24.8 MG/DL (ref 8–23)
CALCIUM SERPL-MCNC: 10.2 MG/DL (ref 8.8–10.2)
CHLORIDE SERPL-SCNC: 99 MMOL/L (ref 98–107)
CHOLEST SERPL-MCNC: 270 MG/DL
COLOR UR AUTO: YELLOW
CREAT SERPL-MCNC: 1.51 MG/DL (ref 0.67–1.17)
CREAT UR-MCNC: 82.9 MG/DL
DEPRECATED HCO3 PLAS-SCNC: 26 MMOL/L (ref 22–29)
ERYTHROCYTE [DISTWIDTH] IN BLOOD BY AUTOMATED COUNT: 13.9 % (ref 10–15)
GFR SERPL CREATININE-BSD FRML MDRD: 51 ML/MIN/1.73M2
GLUCOSE SERPL-MCNC: 142 MG/DL (ref 70–99)
GLUCOSE UR STRIP-MCNC: NEGATIVE MG/DL
HBA1C MFR BLD: 6.2 % (ref 0–5.6)
HCT VFR BLD AUTO: 43.8 % (ref 40–53)
HDLC SERPL-MCNC: 34 MG/DL
HGB BLD-MCNC: 13.9 G/DL (ref 13.3–17.7)
HGB UR QL STRIP: NEGATIVE
KETONES UR STRIP-MCNC: NEGATIVE MG/DL
LDLC SERPL CALC-MCNC: 185 MG/DL
LEUKOCYTE ESTERASE UR QL STRIP: NEGATIVE
MCH RBC QN AUTO: 31 PG (ref 26.5–33)
MCHC RBC AUTO-ENTMCNC: 31.7 G/DL (ref 31.5–36.5)
MCV RBC AUTO: 98 FL (ref 78–100)
MICROALBUMIN UR-MCNC: <12 MG/L
MICROALBUMIN/CREAT UR: NORMAL MG/G{CREAT}
NITRATE UR QL: NEGATIVE
NONHDLC SERPL-MCNC: 236 MG/DL
PH UR STRIP: 6 [PH] (ref 5–8)
PLATELET # BLD AUTO: 382 10E3/UL (ref 150–450)
POTASSIUM SERPL-SCNC: 4.6 MMOL/L (ref 3.4–5.3)
PROT SERPL-MCNC: 7.6 G/DL (ref 6.4–8.3)
PSA SERPL DL<=0.01 NG/ML-MCNC: 2.38 NG/ML (ref 0–4.5)
RBC # BLD AUTO: 4.49 10E6/UL (ref 4.4–5.9)
SODIUM SERPL-SCNC: 136 MMOL/L (ref 136–145)
SP GR UR STRIP: 1.01 (ref 1–1.03)
T4 FREE SERPL-MCNC: 1.09 NG/DL (ref 0.9–1.7)
TRIGL SERPL-MCNC: 254 MG/DL
TSH SERPL DL<=0.005 MIU/L-ACNC: 5.39 UIU/ML (ref 0.3–4.2)
UROBILINOGEN UR STRIP-ACNC: 0.2 E.U./DL
WBC # BLD AUTO: 6.3 10E3/UL (ref 4–11)

## 2023-05-05 PROCEDURE — 82570 ASSAY OF URINE CREATININE: CPT | Performed by: INTERNAL MEDICINE

## 2023-05-05 PROCEDURE — 99214 OFFICE O/P EST MOD 30 MIN: CPT | Performed by: INTERNAL MEDICINE

## 2023-05-05 PROCEDURE — 84439 ASSAY OF FREE THYROXINE: CPT | Performed by: INTERNAL MEDICINE

## 2023-05-05 PROCEDURE — 36415 COLL VENOUS BLD VENIPUNCTURE: CPT | Performed by: INTERNAL MEDICINE

## 2023-05-05 PROCEDURE — 85027 COMPLETE CBC AUTOMATED: CPT | Performed by: INTERNAL MEDICINE

## 2023-05-05 PROCEDURE — 83036 HEMOGLOBIN GLYCOSYLATED A1C: CPT | Performed by: INTERNAL MEDICINE

## 2023-05-05 PROCEDURE — 84153 ASSAY OF PSA TOTAL: CPT | Performed by: INTERNAL MEDICINE

## 2023-05-05 PROCEDURE — 81003 URINALYSIS AUTO W/O SCOPE: CPT | Performed by: INTERNAL MEDICINE

## 2023-05-05 PROCEDURE — 80061 LIPID PANEL: CPT | Performed by: INTERNAL MEDICINE

## 2023-05-05 PROCEDURE — 82043 UR ALBUMIN QUANTITATIVE: CPT | Performed by: INTERNAL MEDICINE

## 2023-05-05 PROCEDURE — 84443 ASSAY THYROID STIM HORMONE: CPT | Performed by: INTERNAL MEDICINE

## 2023-05-05 PROCEDURE — 80053 COMPREHEN METABOLIC PANEL: CPT | Performed by: INTERNAL MEDICINE

## 2023-05-05 RX ORDER — LISINOPRIL 5 MG/1
5 TABLET ORAL DAILY
Qty: 90 TABLET | Refills: 4 | Status: SHIPPED | OUTPATIENT
Start: 2023-05-05 | End: 2024-05-17

## 2023-05-05 RX ORDER — ROSUVASTATIN CALCIUM 5 MG/1
5 TABLET, COATED ORAL DAILY
Qty: 90 TABLET | Refills: 1 | Status: SHIPPED | OUTPATIENT
Start: 2023-05-05 | End: 2024-05-17

## 2023-05-05 NOTE — PROGRESS NOTES
Office Visit - Follow Up   Manas Martinez   63 year old male    Date of Visit: 5/5/2023    Chief Complaint   Patient presents with     Diabetes     The patient would like to discuss blood sugars.         Assessment and Plan   1. Secondary hypertension  Blood pressure a little bit elevated today but he has not taken his medication.  He should take his medications this morning and add low-dose lisinopril for renal protection    2. CKD (chronic kidney disease) stage 4, GFR 15-29 ml/min (H)  As above  - Comprehensive metabolic panel; Future  - CBC with platelets; Future  - UA Macroscopic with reflex to Microscopic and Culture; Future  - Albumin Random Urine Quantitative with Creat Ratio; Future  - lisinopril (ZESTRIL) 5 MG tablet; Take 1 tablet (5 mg) by mouth daily  Dispense: 90 tablet; Refill: 4  - Comprehensive metabolic panel  - CBC with platelets  - UA Macroscopic with reflex to Microscopic and Culture  - Albumin Random Urine Quantitative with Creat Ratio    3. Coronary artery disease involving native coronary artery of native heart without angina pectoris  Encouraged him to take rosuvastatin  - aspirin (ASA) 81 MG EC tablet; Take 1 tablet (81 mg) by mouth daily    4. Mixed hyperlipidemia  As above  - Lipid panel reflex to direct LDL Fasting; Future  - TSH with free T4 reflex; Future  - rosuvastatin (CRESTOR) 5 MG tablet; Take 1 tablet (5 mg) by mouth daily  Dispense: 90 tablet; Refill: 1  - Lipid panel reflex to direct LDL Fasting  - TSH with free T4 reflex    5. Prostate cancer metastatic to intrapelvic lymph node (H)  - PSA, tumor marker; Future  - PSA, tumor marker    6. Hyperglycemia  If he has presence of diabetes would recommend adding Ozempic and consideration of Jardiance as well.  We also discussed metformin  - Hemoglobin A1c; Future  - Hemoglobin A1c    7. Severe obesity (H)  The following high BMI interventions were performed this visit: encouragement to exercise and lifestyle education regarding  "diet    Return in about 2 weeks (around 5/19/2023) for Routine preventive.     History of Present Illness   This 63 year old man comes in for evaluation elevated blood sugars and weight gain.  He is interested in medication to help with weight gain and diabetes       Physical Exam   General Appearance:   No acute distress    /86   Pulse 63   Temp 98.6  F (37  C) (Tympanic)   Resp 18   Ht 1.753 m (5' 9\")   Wt 133.8 kg (295 lb)   SpO2 98%   BMI 43.56 kg/m      Heart rate controlled rhythm regular lungs clear abdomen obese and soft, no edema     Additional Information   Current Outpatient Medications   Medication Sig Dispense Refill     Ascorbic Acid (VITAMIN C PO) Take 1,000 mg by mouth daily       aspirin (ASA) 81 MG EC tablet Take 1 tablet (81 mg) by mouth daily       carvedilol (COREG) 25 MG tablet Take 1 tablet (25 mg) by mouth 2 times daily (with meals) 180 tablet 3     chlorthalidone (HYGROTON) 25 MG tablet Take 0.5 tablets (12.5 mg) by mouth daily 45 tablet 2     cholecalciferol, vitamin D3, 50 mcg (2,000 unit) Tab [CHOLECALCIFEROL, VITAMIN D3, 50 MCG (2,000 UNIT) TAB] Take 50 mcg by mouth.       doxazosin (CARDURA) 1 MG tablet Take 1 tablet (1 mg) by mouth daily 90 tablet 2     lisinopril (ZESTRIL) 5 MG tablet Take 1 tablet (5 mg) by mouth daily 90 tablet 4     rosuvastatin (CRESTOR) 5 MG tablet Take 1 tablet (5 mg) by mouth daily 90 tablet 1     Zinc 50 MG CAPS Take by mouth daily         Time:      Waldo Kerr MD  Answers for HPI/ROS submitted by the patient on 5/3/2023  Frequency of checking blood sugars:: not at all  Diabetic concerns:: other  Paraesthesia present:: weight gain  On average, how many sweetened beverages do you drink each day (Examples: soda, juice, sweet tea, etc.  Do NOT count diet or artificially sweetened beverages)?: 1  How many minutes a day do you exercise enough to make your heart beat faster?: 10 to 19  How many days a week do you exercise enough to make your heart " beat faster?: 3 or less  How many days per week do you miss taking your medication?: 0

## 2023-05-08 ENCOUNTER — TELEPHONE (OUTPATIENT)
Dept: OTOLARYNGOLOGY | Facility: CLINIC | Age: 63
End: 2023-05-08
Payer: MEDICARE

## 2023-05-08 DIAGNOSIS — J32.0 CHRONIC MAXILLARY SINUSITIS: Primary | ICD-10-CM

## 2023-05-08 NOTE — TELEPHONE ENCOUNTER
Spoke with Manas and gave results listed below per Dr. Powell.  Placed allergy referral.  Pt expressed understanding of results.    [facility]      Megan Burks RN  [facility]  ENT  [address] [address]  [email or web address]   Office:[phone]  Employed by [facility]

## 2023-05-08 NOTE — TELEPHONE ENCOUNTER
----- Message from Benjamin Powell MD sent at 5/4/2023 10:33 AM CDT -----  Megan, his recent CT shows chronic sinus inflammation.  Is he following up with allergy?   I think he may be leaving for [place] soon?

## 2023-05-09 ASSESSMENT — ENCOUNTER SYMPTOMS
NAUSEA: 0
FEVER: 0
ABDOMINAL PAIN: 0
EYE PAIN: 0
CONSTIPATION: 0
PARESTHESIAS: 0
SHORTNESS OF BREATH: 0
WEAKNESS: 0
PALPITATIONS: 0
COUGH: 0
NERVOUS/ANXIOUS: 0
HEADACHES: 0
MYALGIAS: 0
FREQUENCY: 1
HEMATURIA: 0
ARTHRALGIAS: 0
CHILLS: 0
HEMATOCHEZIA: 0
DIZZINESS: 0
JOINT SWELLING: 0
DYSURIA: 0
SORE THROAT: 0
HEARTBURN: 0
DIARRHEA: 0

## 2023-05-09 ASSESSMENT — ACTIVITIES OF DAILY LIVING (ADL): CURRENT_FUNCTION: NO ASSISTANCE NEEDED

## 2023-05-15 ENCOUNTER — OFFICE VISIT (OUTPATIENT)
Dept: INTERNAL MEDICINE | Facility: CLINIC | Age: 63
End: 2023-05-15
Payer: MEDICARE

## 2023-05-15 VITALS
OXYGEN SATURATION: 95 % | WEIGHT: 291 LBS | RESPIRATION RATE: 15 BRPM | HEIGHT: 69 IN | TEMPERATURE: 96.5 F | SYSTOLIC BLOOD PRESSURE: 128 MMHG | HEART RATE: 60 BPM | DIASTOLIC BLOOD PRESSURE: 66 MMHG | BODY MASS INDEX: 43.1 KG/M2

## 2023-05-15 DIAGNOSIS — C77.5 PROSTATE CANCER METASTATIC TO INTRAPELVIC LYMPH NODE (H): ICD-10-CM

## 2023-05-15 DIAGNOSIS — J32.9 CHRONIC SINUSITIS, UNSPECIFIED LOCATION: ICD-10-CM

## 2023-05-15 DIAGNOSIS — E66.01 SEVERE OBESITY (H): ICD-10-CM

## 2023-05-15 DIAGNOSIS — Z90.81 ASPLENIA AFTER SURGICAL PROCEDURE: ICD-10-CM

## 2023-05-15 DIAGNOSIS — Z00.00 ANNUAL PHYSICAL EXAM: Primary | ICD-10-CM

## 2023-05-15 DIAGNOSIS — E26.9 HYPERALDOSTERONISM (H): ICD-10-CM

## 2023-05-15 DIAGNOSIS — I15.9 SECONDARY HYPERTENSION: ICD-10-CM

## 2023-05-15 DIAGNOSIS — C61 PROSTATE CANCER METASTATIC TO INTRAPELVIC LYMPH NODE (H): ICD-10-CM

## 2023-05-15 DIAGNOSIS — N18.4 CKD (CHRONIC KIDNEY DISEASE) STAGE 4, GFR 15-29 ML/MIN (H): ICD-10-CM

## 2023-05-15 DIAGNOSIS — I25.10 CORONARY ARTERY DISEASE INVOLVING NATIVE CORONARY ARTERY OF NATIVE HEART WITHOUT ANGINA PECTORIS: ICD-10-CM

## 2023-05-15 DIAGNOSIS — E78.2 MIXED HYPERLIPIDEMIA: ICD-10-CM

## 2023-05-15 DIAGNOSIS — R80.9 TYPE 2 DIABETES MELLITUS WITH MICROALBUMINURIA, WITHOUT LONG-TERM CURRENT USE OF INSULIN (H): ICD-10-CM

## 2023-05-15 DIAGNOSIS — E11.29 TYPE 2 DIABETES MELLITUS WITH MICROALBUMINURIA, WITHOUT LONG-TERM CURRENT USE OF INSULIN (H): ICD-10-CM

## 2023-05-15 DIAGNOSIS — G47.33 OSA (OBSTRUCTIVE SLEEP APNEA): ICD-10-CM

## 2023-05-15 DIAGNOSIS — R73.09 OTHER ABNORMAL GLUCOSE: ICD-10-CM

## 2023-05-15 DIAGNOSIS — D35.01 ADRENAL ADENOMA, RIGHT: ICD-10-CM

## 2023-05-15 LAB
ANION GAP SERPL CALCULATED.3IONS-SCNC: 14 MMOL/L (ref 7–15)
BUN SERPL-MCNC: 31.3 MG/DL (ref 8–23)
CALCIUM SERPL-MCNC: 10.1 MG/DL (ref 8.8–10.2)
CHLORIDE SERPL-SCNC: 105 MMOL/L (ref 98–107)
CREAT SERPL-MCNC: 1.43 MG/DL (ref 0.67–1.17)
DEPRECATED HCO3 PLAS-SCNC: 22 MMOL/L (ref 22–29)
GFR SERPL CREATININE-BSD FRML MDRD: 54 ML/MIN/1.73M2
GLUCOSE SERPL-MCNC: 133 MG/DL (ref 70–99)
POTASSIUM SERPL-SCNC: 4.9 MMOL/L (ref 3.4–5.3)
SODIUM SERPL-SCNC: 141 MMOL/L (ref 136–145)

## 2023-05-15 PROCEDURE — 99214 OFFICE O/P EST MOD 30 MIN: CPT | Mod: 25 | Performed by: INTERNAL MEDICINE

## 2023-05-15 PROCEDURE — 80048 BASIC METABOLIC PNL TOTAL CA: CPT | Performed by: INTERNAL MEDICINE

## 2023-05-15 PROCEDURE — 36415 COLL VENOUS BLD VENIPUNCTURE: CPT | Performed by: INTERNAL MEDICINE

## 2023-05-15 PROCEDURE — G0402 INITIAL PREVENTIVE EXAM: HCPCS | Performed by: INTERNAL MEDICINE

## 2023-05-15 RX ORDER — OMEGA-3/DHA/EPA/FISH OIL 60 MG-90MG
CAPSULE ORAL
COMMUNITY
End: 2023-10-25

## 2023-05-15 ASSESSMENT — ENCOUNTER SYMPTOMS
HEMATOCHEZIA: 0
SORE THROAT: 0
MYALGIAS: 0
NERVOUS/ANXIOUS: 0
ABDOMINAL PAIN: 0
SHORTNESS OF BREATH: 0
HEMATURIA: 0
EYE PAIN: 0
PALPITATIONS: 0
ARTHRALGIAS: 0
DYSURIA: 0
JOINT SWELLING: 0
FEVER: 0
CONSTIPATION: 0
DIZZINESS: 0
NAUSEA: 0
HEADACHES: 0
COUGH: 0
FREQUENCY: 1
PARESTHESIAS: 0
HEARTBURN: 0
DIARRHEA: 0
WEAKNESS: 0
CHILLS: 0

## 2023-05-15 ASSESSMENT — ACTIVITIES OF DAILY LIVING (ADL): CURRENT_FUNCTION: NO ASSISTANCE NEEDED

## 2023-05-15 NOTE — PROGRESS NOTES
"SUBJECTIVE:   Manas is a 63 year old who presents for Preventive Visit.      5/15/2023     7:36 AM   Additional Questions   Roomed by Nuvia LOPES   Patient has been advised of split billing requirements and indicates understanding: Yes  Are you in the first 12 months of your Medicare coverage?  Yes,  Visual Acuity:  Right Eye: 10/16   Left Eye: 10/32  Both Eyes: 10/16    Healthy Habits:     In general, how would you rate your overall health?  Excellent    Frequency of exercise:  None    Do you usually eat at least 4 servings of fruit and vegetables a day, include whole grains    & fiber and avoid regularly eating high fat or \"junk\" foods?  Yes    Taking medications regularly:  Yes    Medication side effects:  None    Ability to successfully perform activities of daily living:  No assistance needed    Home Safety:  No safety concerns identified    Hearing Impairment:  No hearing concerns    In the past 6 months, have you been bothered by leaking of urine?  No    In general, how would you rate your overall mental or emotional health?  Excellent      PHQ-2 Total Score: 0    Additional concerns today:  Yes      Have you ever done Advance Care Planning? (For example, a Health Directive, POLST, or a discussion with a medical provider or your loved ones about your wishes): Yes, patient states has an Advance Care Planning document and will bring a copy to the clinic.       Fall risk  Fallen 2 or more times in the past year?: No  Any fall with injury in the past year?: No    Cognitive Screening   1) Repeat 3 items (Leader, Season, Table)  River, susana, finger  2) Clock draw: NORMAL  3) 3 item recall: Recalls 3 objects  Results: 3 items recalled: COGNITIVE IMPAIRMENT LESS LIKELY    Mini-CogTM Copyright LYNN Pizarro. Licensed by the author for use in [facility]; reprinted with permission (michelle@.[facility]). All rights reserved.      Do you have sleep apnea, excessive snoring or daytime drowsiness?: yes    Reviewed and " updated as needed this visit by clinical staff   Tobacco  Allergies  Meds              Reviewed and updated as needed this visit by Provider                 Social History     Tobacco Use     Smoking status: Former     Packs/day: 1.00     Years: 25.00     Pack years: 25.00     Types: Cigarettes     Quit date: 1999     Years since quittin.3     Smokeless tobacco: Never   Vaping Use     Vaping status: Never Used   Substance Use Topics     Alcohol use: Yes     Alcohol/week: 10.0 standard drinks of alcohol           2023    11:23 AM   Alcohol Use   Prescreen: >3 drinks/day or >7 drinks/week? Yes     Do you have a current opioid prescription? No  Do you use any other controlled substances or medications that are not prescribed by a provider? None      Current providers sharing in care for this patient include:   Patient Care Team:  Waldo Kerr MD as PCP - General (Internal Medicine)  Miguel Cordon MD as MD (Hematology & Oncology)  Daisha Keller MD as MD (Hematology & Oncology)  Waldo Kerr MD as Assigned PCP  Marietta Pearce APRN CNP as Assigned Cancer Care Provider  Ginny Dos Santos MD as MD (Otolaryngology)  Ginny Dos Santos MD as Assigned Surgical Provider  Benjamin Powell MD as MD (Otolaryngology)    The following health maintenance items are reviewed in Epic and correct as of today:  Health Maintenance   Topic Date Due     ZOSTER IMMUNIZATION (1 of 2) Never done     COVID-19 Vaccine (3 - Pfizer series) 2021     ANNUAL REVIEW OF HM ORDERS  06/15/2023     BMP  2023     MICROALBUMIN  2023     MEDICARE ANNUAL WELLNESS VISIT  2023     HEMOGLOBIN  2023     LIPID  2024     FALL RISK ASSESSMENT  05/15/2024     ADVANCE CARE PLANNING  2024     COLORECTAL CANCER SCREENING  2025     MENINGITIS IMMUNIZATION (3 - Risk 2-dose series) 2025     DTAP/TDAP/TD IMMUNIZATION (2 - Td or Tdap) 2027     PARATHYROID  Completed      "PHOSPHORUS  Completed     HEPATITIS C SCREENING  Completed     PHQ-2 (once per calendar year)  Completed     INFLUENZA VACCINE  Completed     Pneumococcal Vaccine: 65+ Years  Completed     URINALYSIS  Completed     ALK PHOS  Completed     AORTIC ANEURYSM SCREENING (SYSTEM ASSIGNED)  Completed     IPV IMMUNIZATION  Aged Out     HIV SCREENING  Discontinued     Review of Systems   Constitutional: Negative for chills and fever.   HENT: Positive for congestion. Negative for ear pain, hearing loss and sore throat.    Eyes: Negative for pain and visual disturbance.   Respiratory: Negative for cough and shortness of breath.    Cardiovascular: Negative for chest pain, palpitations and peripheral edema.   Gastrointestinal: Negative for abdominal pain, constipation, diarrhea, heartburn, hematochezia and nausea.   Genitourinary: Positive for frequency, impotence and urgency. Negative for dysuria, genital sores, hematuria and penile discharge.   Musculoskeletal: Negative for arthralgias, joint swelling and myalgias.   Skin: Negative for rash.   Neurological: Negative for dizziness, weakness, headaches and paresthesias.   Psychiatric/Behavioral: Negative for mood changes. The patient is not nervous/anxious.        OBJECTIVE:   /66   Pulse 60   Temp (!) 96.5  F (35.8  C) (Tympanic)   Resp 15   Ht 1.74 m (5' 8.5\")   Wt 132 kg (291 lb)   SpO2 95%   BMI 43.60 kg/m   Estimated body mass index is 43.6 kg/m  as calculated from the following:    Height as of this encounter: 1.74 m (5' 8.5\").    Weight as of this encounter: 132 kg (291 lb).  Physical Exam  EYES: Eyelids, conjunctiva, and sclera were normal. Pupils were normal. Cornea, iris, and lens were normal bilaterally.  HEAD, EARS, NOSE, MOUTH, AND THROAT: Head and face were normal. Hearing was normal to voice and the ears were normal to external exam. Nose appearance was normal and there was no discharge. Oropharynx was normal.  NECK: Neck appearance was normal. There " were no neck masses and the thyroid was not enlarged.  RESPIRATORY: Breathing pattern was normal and the chest moved symmetrically.  Percussion/auscultatory percussion was normal.  Lung sounds were normal and there were no abnormal sounds.  CARDIOVASCULAR: Heart rate and rhythm were normal.  S1 and S2 were normal and there were no extra sounds or murmurs. Peripheral pulses in arms and legs were normal.  Jugular venous pressure was normal.  There was no peripheral edema.  GASTROINTESTINAL: The abdomen was normal in contour.  Bowel sounds were present.  Percussion detected no organ enlargement or tenderness.  Palpation detected no tenderness, mass, or enlarged organs.   MUSCULOSKELETAL: Skeletal configuration was normal and muscle mass was normal for age. Joint appearance was overall normal.  LYMPHATIC: There were no enlarged nodes.  SKIN/HAIR/NAILS: Skin color was normal.  There were no skin lesions.  Hair and nails were normal.  NEUROLOGIC: The patient was alert and oriented to person, place, time, and circumstance. Speech was normal. Cranial nerves were normal. Motor strength was normal for age. The patient was normally coordinated.  PSYCHIATRIC:  Mood and affect were normal and the patient had normal recent and remote memory. The patient's judgment and insight were normal.    ASSESSMENT / PLAN:   1. Annual physical exam  Is a 63-year-old man with issues as discussed below    2. Prediabetes  His blood sugar was 142 while fasting this visit.  I think if we get another reading above 126 at this would be consistent and diagnostic of diabetes despite an A1c of less than 6.5%.  He is interested in taking Ozempic and we will wait for the blood sugar to come back before prescribing his  - Basic metabolic panel  (Ca, Cl, CO2, Creat, Gluc, K, Na, BUN); Future  - Basic metabolic panel  (Ca, Cl, CO2, Creat, Gluc, K, Na, BUN)    3. CAD, MI ~'98, PALLAVI RCA  Continue secondary prevention, now back on low-dose statin    4. Prostate  "cancer metastatic to intrapelvic lymph node (H)  Elevating PSA which is concerning, CT reviewed and no evidence of new metastatic disease.  He is going to visit with Dr. Miguel Cordon    5. Secondary hypertension  6. Adrenal adenoma, right, s/p adrenalectomy  7. Hyperaldosteronism (H)  Status post resection, blood pressure controlled    8. CKD (chronic kidney disease) stage 4, GFR 15-29 ml/min (H)  Stable, check labs as above recent labs reviewed    9. Mixed hyperlipidemia  As above    10. KIERSTEN not on CPAP    11. Asplenia after surgical procedure  He is up-to-date on pneumococcal and meningococcal vaccine    12. Chronic sinusitis, unspecified location  Will be meeting with allergy, follows with Dr. Powell    13. Severe obesity (H)  COUNSELING:  Reviewed preventive health counseling, as reflected in patient instructions      BMI:   Estimated body mass index is 43.6 kg/m  as calculated from the following:    Height as of this encounter: 1.74 m (5' 8.5\").    Weight as of this encounter: 132 kg (291 lb).   Weight management plan: Discussed healthy diet and exercise guidelines      He reports that he quit smoking about 24 years ago. His smoking use included cigarettes and cigarettes. He has a 25.00 pack-year smoking history. He has never used smokeless tobacco.      Appropriate preventive services were discussed with this patient, including applicable screening as appropriate for cardiovascular disease, diabetes, osteopenia/osteoporosis, and glaucoma.  As appropriate for age/gender, discussed screening for colorectal cancer, prostate cancer, breast cancer, and cervical cancer. Checklist reviewing preventive services available has been given to the patient.    Reviewed patients plan of care and provided an AVS. The Basic Care Plan (routine screening as documented in Health Maintenance) for Manas meets the Care Plan requirement. This Care Plan has been established and reviewed with the Patient.    Waldo Kerr MD  [facility]    Identified Health Risks:    I have reviewed Opioid Use Disorder and Substance Use Disorder risk factors and made any needed referrals.

## 2023-06-07 ENCOUNTER — ONCOLOGY VISIT (OUTPATIENT)
Dept: ONCOLOGY | Facility: HOSPITAL | Age: 63
End: 2023-06-07
Attending: INTERNAL MEDICINE
Payer: MEDICARE

## 2023-06-07 ENCOUNTER — LAB (OUTPATIENT)
Dept: LAB | Facility: HOSPITAL | Age: 63
End: 2023-06-07
Attending: INTERNAL MEDICINE
Payer: MEDICARE

## 2023-06-07 VITALS
DIASTOLIC BLOOD PRESSURE: 74 MMHG | HEART RATE: 61 BPM | HEIGHT: 69 IN | WEIGHT: 290.1 LBS | TEMPERATURE: 97.6 F | SYSTOLIC BLOOD PRESSURE: 154 MMHG | RESPIRATION RATE: 22 BRPM | BODY MASS INDEX: 42.97 KG/M2 | OXYGEN SATURATION: 96 %

## 2023-06-07 DIAGNOSIS — C77.5 PROSTATE CANCER METASTATIC TO INTRAPELVIC LYMPH NODE (H): Primary | ICD-10-CM

## 2023-06-07 DIAGNOSIS — C61 PROSTATE CANCER METASTATIC TO INTRAPELVIC LYMPH NODE (H): Primary | ICD-10-CM

## 2023-06-07 DIAGNOSIS — C77.5 PROSTATE CANCER METASTATIC TO INTRAPELVIC LYMPH NODE (H): ICD-10-CM

## 2023-06-07 DIAGNOSIS — C61 PROSTATE CANCER METASTATIC TO INTRAPELVIC LYMPH NODE (H): ICD-10-CM

## 2023-06-07 PROCEDURE — G0463 HOSPITAL OUTPT CLINIC VISIT: HCPCS | Performed by: INTERNAL MEDICINE

## 2023-06-07 PROCEDURE — 99214 OFFICE O/P EST MOD 30 MIN: CPT | Performed by: INTERNAL MEDICINE

## 2023-06-07 PROCEDURE — 99213 OFFICE O/P EST LOW 20 MIN: CPT | Performed by: INTERNAL MEDICINE

## 2023-06-07 PROCEDURE — 36415 COLL VENOUS BLD VENIPUNCTURE: CPT

## 2023-06-07 PROCEDURE — 84153 ASSAY OF PSA TOTAL: CPT

## 2023-06-07 ASSESSMENT — PAIN SCALES - GENERAL: PAINLEVEL: MILD PAIN (3)

## 2023-06-07 NOTE — LETTER
"    6/7/2023         RE: Manas Martinez  [address]  [facility] [address]        Dear Colleague,    Thank you for referring your patient, Manas Martinez, to the [facility]. Please see a copy of my visit note below.    Oncology Rooming Note    June 7, 2023 3:17 PM   Manas Martinez is a 63 year old male who presents for:    Chief Complaint   Patient presents with     Oncology Clinic Visit     6 month follow up related to Prostate cancer metastatic to intrapelvic lymph node     Initial Vitals: BP (!) 154/74 (BP Location: Left arm, Patient Position: Sitting, Cuff Size: Adult Large)   Pulse 61   Temp 97.6  F (36.4  C) (Tympanic)   Resp 22   Ht 1.74 m (5' 8.5\")   Wt 131.6 kg (290 lb 1.6 oz)   SpO2 96%   BMI 43.47 kg/m   Estimated body mass index is 43.47 kg/m  as calculated from the following:    Height as of this encounter: 1.74 m (5' 8.5\").    Weight as of this encounter: 131.6 kg (290 lb 1.6 oz). Body surface area is 2.52 meters squared.  Mild Pain (3) Comment: Data Unavailable   No LMP for male patient.  Allergies reviewed: Yes  Medications reviewed: Yes    Medications: Medication refills not needed today.  Pharmacy name entered into [facility]:    [id number] - [place], [place] - [address]  [facility] (SPECIALTY) [facility] [address] [facility]    Clinical concerns: 6 month follow up related to Prostate cancer metastatic to intrapelvic lymph node      JOE MIRAMONTES CMA              [facility] [place] [facility] Progress Note    Patient: Manas Martinez  MRN: [id number]  Date of Service: Jun 7, 2023        Assessment and Plan:    1.  Prostate cancer:  PSA recurrence. PSA on 6/7/23 was 2.83. 0.27 in August 2022. He has contacted his providers at [place] and has a PET scan scheuled for this week, I believe. I told him we will likely restart Eligard if the PET is unremarkable and Eligard and " abiraterone if there is evidence of metastatic disease. He had a CT done on May 2nd, 2023 which showed no evidence of metastases.      2.  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    3.  Left renal mass/cyst:  Stable simple cyst on recent imaging. No followup is recommended.     Today's visit was centered around a cancer diagnosis in the setting of additional medical comorbidities. The risk of additional morbidity or  wasmortality with or without further treatment is high.     ECOG Performance  0    Diagnosis:    1.  Metastatic prostate cancer: Biopsy of a retroperitoneal node in December 2019 showed adenocarcinoma.  CT scan showed adenopathy infolding the aortic bifurcation and mild pelvic .  adenopathy along the internal iliac chains bilaterally.  Bone scan at diagnosis was negative.  PET scan showed avid bilateral external iliac, left common iliac, aortocaval, and left retroperitoneal lymphadenopathy.  The most cephalad note is a 15 x 12 mm aortocaval node immediately anterior to L3.  Prostate MRI shows cancer involving almost the entire gland with broad capsular blurring without obvious evidence of extracapsular extension.  Prostate biopsy shows Rosa grade 5+4 = 9, the majority appearing to be 4+4 = 8.  PSA at diagnosis was around 4.0.    Treatment:    Initially given Firmagon on December 11, 2019.     He received radiation to the prostate and pelvic nodes at the [facility].  Definitive radiotherapy to 70.2 Gy in 26 fractions of IMRT to the prostate, as well as treatment of the pelvic and para-aortic lymph nodes. Treatment dates were June 8 through July 14, 2020.     Eligard q3 months and apalutamide started in January 2020.  We held the apalutamide in July, 2020 given elevated cholesterol and hypertension.  2 years of Eligard completed in 12/2021.    Interim History:    Manas returns today for a follow up visit. He was last seen in our clinic in December, 2021. Since then he has  "recently been discovered to have a rising PSA. He comes back for evaluation. He is feeling oko. Denies pain or woresening fatigue.    Review of Systems:    As above in the history.     Review of Systems otherwise Negative for:  General: chills, fever or night sweats  Psychological: anxiety or depression  Ophthalmic: blurry vision, double vision or loss of vision, vision change  ENT: epistaxis, oral lesions, hearing changes  Hematological and Lymphatic: bleeding, bruising, jaundice, swollen lymph nodes  Endocrine: hot flashes, unexpected weight changes  Respiratory: cough, hemoptysis, orthopnea or shortness of breath/CROCKER  Cardiovascular: chest pain, edema, palpitations or PND  Gastrointestinal: abdominal pain, blood in stools, change in bowel habits, constipation, diarrhea or nausea/vomiting  Genito-Urinary: change in urinary stream, incontinence, frequency/urgency  Musculoskeletal: joint pain, stiffness, swelling, muscle pain  Neurological: dizziness, headaches, numbness/tingling  Dermatological: lumps and rash    Past History:    Past Medical History:   Diagnosis Date     Adenocarcinoma of prostate (H)      Allergic rhinitis      Cancer (H)      Coronary artery disease      Deviated septum      Elevated PSA      Hyperlipemia      Hypertension      Joint pain      Myocardial infarction (H) 1999    Non Q wave MI, RCA     Obesity      Other asplenic status      Prediabetes      Sleep apnea      Urinary frequency      Physical Exam:    BP (!) 154/74 (BP Location: Left arm, Patient Position: Sitting, Cuff Size: Adult Large)   Pulse 61   Temp 97.6  F (36.4  C) (Tympanic)   Resp 22   Ht 1.74 m (5' 8.5\")   Wt 131.6 kg (290 lb 1.6 oz)   SpO2 96%   BMI 43.47 kg/m      General: patient appears stated age of 63 year old. Nontoxic and in no distress.   HEENT: Head: atraumatic, normocephalic. Sclerae anicteric.  Chest:  Normal respiratory effort  Cardiac:  No edema.   Abdomen: abdomen is soft, non-distended  Extremities: " normal tone and muscle bulk.  Skin: no lesions or rash on visible skin. Warm and dry.   CNS: alert and oriented. Grossly non-focal.   Psychiatric: normal mood and affect.     Lab Results:    Recent Results (from the past 168 hour(s))   PSA, tumor marker   Result Value Ref Range    PSA Tumor Marker 2.83 0.00 - 4.50 ng/mL     Imaging:    No results found.      Signed by: Miguel Cordon MD        Again, thank you for allowing me to participate in the care of your patient.        Sincerely,        Miguel Cordon MD

## 2023-06-07 NOTE — PROGRESS NOTES
"Oncology Rooming Note    June 7, 2023 3:17 PM   Manas Martinez is a 63 year old male who presents for:    Chief Complaint   Patient presents with     Oncology Clinic Visit     6 month follow up related to Prostate cancer metastatic to intrapelvic lymph node     Initial Vitals: BP (!) 154/74 (BP Location: Left arm, Patient Position: Sitting, Cuff Size: Adult Large)   Pulse 61   Temp 97.6  F (36.4  C) (Tympanic)   Resp 22   Ht 1.74 m (5' 8.5\")   Wt 131.6 kg (290 lb 1.6 oz)   SpO2 96%   BMI 43.47 kg/m   Estimated body mass index is 43.47 kg/m  as calculated from the following:    Height as of this encounter: 1.74 m (5' 8.5\").    Weight as of this encounter: 131.6 kg (290 lb 1.6 oz). Body surface area is 2.52 meters squared.  Mild Pain (3) Comment: Data Unavailable   No LMP for male patient.  Allergies reviewed: Yes  Medications reviewed: Yes    Medications: Medication refills not needed today.  Pharmacy name entered into [facility]:    [facility] #[id number] - [place], [place] - [address] AT [facility] [address]  [facility]SPECIAL[facility] [place] [facility] [address] [facility]    Clinical concerns: 6 month follow up related to Prostate cancer metastatic to intrapelvic lymph node      JOE MIRAMONTES CMA            "

## 2023-06-08 LAB — PSA SERPL DL<=0.01 NG/ML-MCNC: 2.83 NG/ML (ref 0–4.5)

## 2023-06-11 NOTE — PROGRESS NOTES
[facility] Hematology and Oncology Progress Note    Patient: Manas Martinez  MRN: [id number]  Date of Service: Jun 7, 2023        Assessment and Plan:    1.  Prostate cancer:  PSA recurrence. PSA on 6/7/23 was 2.83. 0.27 in August 2022. He has contacted his providers at [facility] and has a PET scan scheuled for this week, I believe. I told him we will likely restart Eligard if the PET is unremarkable and Eligard and abiraterone if there is evidence of metastatic disease. He had a CT done on May 2nd, 2023 which showed no evidence of metastases.      2.  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    3.  Left renal mass/cyst:  Stable simple cyst on recent imaging. No followup is recommended.     Today's visit was centered around a cancer diagnosis in the setting of additional medical comorbidities. The risk of additional morbidity or  wasmortality with or without further treatment is high.     ECOG Performance  0    Diagnosis:    1.  Metastatic prostate cancer: Biopsy of a retroperitoneal node in December 2019 showed adenocarcinoma.  CT scan showed adenopathy infolding the aortic bifurcation and mild pelvic .  adenopathy along the internal iliac chains bilaterally.  Bone scan at diagnosis was negative.  PET scan showed avid bilateral external iliac, left common iliac, aortocaval, and left retroperitoneal lymphadenopathy.  The most cephalad note is a 15 x 12 mm aortocaval node immediately anterior to L3.  Prostate MRI shows cancer involving almost the entire gland with broad capsular blurring without obvious evidence of extracapsular extension.  Prostate biopsy shows Rosa grade 5+4 = 9, the majority appearing to be 4+4 = 8.  PSA at diagnosis was around 4.0.    Treatment:    Initially given Firmagon on December 11, 2019.     He received radiation to the prostate and pelvic nodes at the [facility].  Definitive radiotherapy to 70.2 Gy in 26 fractions of IMRT to the prostate, as well as  treatment of the pelvic and para-aortic lymph nodes. Treatment dates were June 8 through July 14, 2020.     Eligard q3 months and apalutamide started in January 2020.  We held the apalutamide in July, 2020 given elevated cholesterol and hypertension.  2 years of Eligard completed in 12/2021.    Interim History:    Manas returns today for a follow up visit. He was last seen in our clinic in December, 2021. Since then he has recently been discovered to have a rising PSA. He comes back for evaluation. He is feeling oko. Denies pain or woresening fatigue.    Review of Systems:    As above in the history.     Review of Systems otherwise Negative for:  General: chills, fever or night sweats  Psychological: anxiety or depression  Ophthalmic: blurry vision, double vision or loss of vision, vision change  ENT: epistaxis, oral lesions, hearing changes  Hematological and Lymphatic: bleeding, bruising, jaundice, swollen lymph nodes  Endocrine: hot flashes, unexpected weight changes  Respiratory: cough, hemoptysis, orthopnea or shortness of breath/CROCKER  Cardiovascular: chest pain, edema, palpitations or PND  Gastrointestinal: abdominal pain, blood in stools, change in bowel habits, constipation, diarrhea or nausea/vomiting  Genito-Urinary: change in urinary stream, incontinence, frequency/urgency  Musculoskeletal: joint pain, stiffness, swelling, muscle pain  Neurological: dizziness, headaches, numbness/tingling  Dermatological: lumps and rash    Past History:    Past Medical History:   Diagnosis Date     Adenocarcinoma of prostate (H)      Allergic rhinitis      Cancer (H)      Coronary artery disease      Deviated septum      Elevated PSA      Hyperlipemia      Hypertension      Joint pain      Myocardial infarction (H) 1999    Non Q wave MI, RCA     Obesity      Other asplenic status      Prediabetes      Sleep apnea      Urinary frequency      Physical Exam:    BP (!) 154/74 (BP Location: Left arm, Patient Position: Sitting,  "Cuff Size: Adult Large)   Pulse 61   Temp 97.6  F (36.4  C) (Tympanic)   Resp 22   Ht 1.74 m (5' 8.5\")   Wt 131.6 kg (290 lb 1.6 oz)   SpO2 96%   BMI 43.47 kg/m      General: patient appears stated age of 63 year old. Nontoxic and in no distress.   HEENT: Head: atraumatic, normocephalic. Sclerae anicteric.  Chest:  Normal respiratory effort  Cardiac:  No edema.   Abdomen: abdomen is soft, non-distended  Extremities: normal tone and muscle bulk.  Skin: no lesions or rash on visible skin. Warm and dry.   CNS: alert and oriented. Grossly non-focal.   Psychiatric: normal mood and affect.     Lab Results:    Recent Results (from the past 168 hour(s))   PSA, tumor marker   Result Value Ref Range    PSA Tumor Marker 2.83 0.00 - 4.50 ng/mL     Imaging:    No results found.      Signed by: Miguel Cordon MD    "

## 2023-06-26 ENCOUNTER — TELEPHONE (OUTPATIENT)
Dept: ONCOLOGY | Facility: HOSPITAL | Age: 63
End: 2023-06-26
Payer: MEDICARE

## 2023-06-26 NOTE — TELEPHONE ENCOUNTER
Patient calls in today and leaves a message on the nurse triage line stating that he would like to get back in to see Dr. Cordon.  Dr. Cordon wanted him to have some additional imaging and that has been completed.  He needs to discuss the plan of care and that he wants to start back up on treatment.  He would like a call back as soon as possible at [phone].  I will get this routed over to the RN care coordinator as the patient has already sent in a [employer] message and RAMEZ Campbell care coordinator has taken that message.    Shea Mcgarry RN

## 2023-07-03 DIAGNOSIS — C61 PROSTATE CANCER METASTATIC TO INTRAPELVIC LYMPH NODE (H): Primary | ICD-10-CM

## 2023-07-03 DIAGNOSIS — C77.5 PROSTATE CANCER METASTATIC TO INTRAPELVIC LYMPH NODE (H): Primary | ICD-10-CM

## 2023-07-03 RX ORDER — PROCHLORPERAZINE MALEATE 10 MG
10 TABLET ORAL EVERY 6 HOURS PRN
Qty: 30 TABLET | Refills: 2 | Status: SHIPPED | OUTPATIENT
Start: 2023-07-09 | End: 2023-07-03

## 2023-07-07 ENCOUNTER — TELEPHONE (OUTPATIENT)
Dept: ONCOLOGY | Facility: HOSPITAL | Age: 63
End: 2023-07-07
Payer: MEDICARE

## 2023-07-10 ENCOUNTER — TRANSFERRED RECORDS (OUTPATIENT)
Dept: HEALTH INFORMATION MANAGEMENT | Facility: CLINIC | Age: 63
End: 2023-07-10
Payer: MEDICARE

## 2023-07-11 DIAGNOSIS — I10 ESSENTIAL HYPERTENSION: ICD-10-CM

## 2023-07-11 DIAGNOSIS — C77.5 PROSTATE CANCER METASTATIC TO INTRAPELVIC LYMPH NODE (H): Primary | ICD-10-CM

## 2023-07-11 DIAGNOSIS — C61 PROSTATE CANCER METASTATIC TO INTRAPELVIC LYMPH NODE (H): Primary | ICD-10-CM

## 2023-07-11 DIAGNOSIS — I15.9 SECONDARY HYPERTENSION: ICD-10-CM

## 2023-07-11 RX ORDER — CHLORTHALIDONE 25 MG/1
12.5 TABLET ORAL DAILY
Qty: 45 TABLET | Refills: 2 | Status: SHIPPED | OUTPATIENT
Start: 2023-07-11 | End: 2024-05-17

## 2023-07-11 RX ORDER — DOXAZOSIN 1 MG/1
1 TABLET ORAL DAILY
Qty: 90 TABLET | Refills: 2 | Status: SHIPPED | OUTPATIENT
Start: 2023-07-11 | End: 2024-06-19

## 2023-07-11 NOTE — TELEPHONE ENCOUNTER
"Routing refill request to provider for review/approval because:  Labs out of range:  Creatinine  BP not in range.  Early refill requested.    Last Written Prescription Date:  12/8/22  Last Fill Quantity: 45/90,  # refills: 2   Last office visit provider:  5/15/23     Requested Prescriptions   Pending Prescriptions Disp Refills     chlorthalidone (HYGROTON) 25 MG tablet 45 tablet 2     Sig: Take 0.5 tablets (12.5 mg) by mouth daily       Diuretics (Including Combos) Protocol Failed - 7/11/2023  8:15 AM        Failed - Blood pressure under 140/90 in past 12 months     BP Readings from Last 3 Encounters:   06/07/23 (!) 154/74   05/15/23 128/66   05/05/23 138/86                 Failed - Normal serum creatinine on file in past 12 months     Recent Labs   Lab Test 05/15/23  0822   CR 1.43*              Passed - Recent (12 mo) or future (30 days) visit within the authorizing provider's specialty     Patient has had an office visit with the authorizing provider or a provider within the authorizing providers department within the previous 12 mos or has a future within next 30 days. See \"Patient Info\" tab in inbasket, or \"Choose Columns\" in Meds & Orders section of the refill encounter.              Passed - Medication is active on med list        Passed - Patient is age 18 or older        Passed - Normal serum potassium on file in past 12 months     Recent Labs   Lab Test 05/15/23  0822   POTASSIUM 4.9                    Passed - Normal serum sodium on file in past 12 months     Recent Labs   Lab Test 05/15/23  0822                    doxazosin (CARDURA) 1 MG tablet 90 tablet 2     Sig: Take 1 tablet (1 mg) by mouth daily       Alpha Blockers Failed - 7/11/2023  8:15 AM        Failed - Blood pressure under 140/90 in past 12 months     BP Readings from Last 3 Encounters:   06/07/23 (!) 154/74   05/15/23 128/66   05/05/23 138/86                 Passed - Recent (12 mo) or future (30 days) visit within the authorizing " "provider's specialty     Patient has had an office visit with the authorizing provider or a provider within the authorizing providers department within the previous 12 mos or has a future within next 30 days. See \"Patient Info\" tab in inbasket, or \"Choose Columns\" in Meds & Orders section of the refill encounter.              Passed - Patient does not have Tadalafil, Vardenafil, or Sildenafil on their medication list        Passed - Medication is active on med list        Passed - Patient is 18 years of age or older       Antiadrenergic Antihypertensives Failed - 7/11/2023  8:15 AM        Failed - Blood pressure less than 140/90 in past 6 months     BP Readings from Last 3 Encounters:   06/07/23 (!) 154/74   05/15/23 128/66   05/05/23 138/86                 Failed - Normal serum creatinine on file in past 12 months     Recent Labs   Lab Test 05/15/23  0822   CR 1.43*       Ok to refill medication if creatinine is low          Passed - Medication is active on med list        Passed - Patient is age 18 or older        Passed - Recent (6 mo) or future (30 days) visit within the authorizing provider's specialty     Patient had office visit in the last 6 months or has a visit in the next 30 days with authorizing provider or within the authorizing provider's specialty.  See \"Patient Info\" tab in inbasket, or \"Choose Columns\" in Meds & Orders section of the refill encounter.                 Jorje Hussein RN 07/11/23 11:42 AM  "

## 2023-07-14 ENCOUNTER — INFUSION THERAPY VISIT (OUTPATIENT)
Dept: INFUSION THERAPY | Facility: HOSPITAL | Age: 63
End: 2023-07-14
Payer: MEDICARE

## 2023-07-14 ENCOUNTER — ONCOLOGY VISIT (OUTPATIENT)
Dept: ONCOLOGY | Facility: HOSPITAL | Age: 63
End: 2023-07-14
Payer: MEDICARE

## 2023-07-14 VITALS
HEIGHT: 68 IN | DIASTOLIC BLOOD PRESSURE: 74 MMHG | TEMPERATURE: 98.3 F | RESPIRATION RATE: 20 BRPM | OXYGEN SATURATION: 95 % | HEART RATE: 71 BPM | SYSTOLIC BLOOD PRESSURE: 146 MMHG | BODY MASS INDEX: 43.51 KG/M2 | WEIGHT: 287.1 LBS

## 2023-07-14 DIAGNOSIS — C77.5 PROSTATE CANCER METASTATIC TO INTRAPELVIC LYMPH NODE (H): Primary | ICD-10-CM

## 2023-07-14 DIAGNOSIS — C61 PROSTATE CANCER METASTATIC TO INTRAPELVIC LYMPH NODE (H): Primary | ICD-10-CM

## 2023-07-14 DIAGNOSIS — I10 ESSENTIAL HYPERTENSION: ICD-10-CM

## 2023-07-14 PROCEDURE — 250N000011 HC RX IP 250 OP 636: Performed by: INTERNAL MEDICINE

## 2023-07-14 PROCEDURE — G0463 HOSPITAL OUTPT CLINIC VISIT: HCPCS | Mod: 25 | Performed by: INTERNAL MEDICINE

## 2023-07-14 PROCEDURE — 96402 CHEMO HORMON ANTINEOPL SQ/IM: CPT

## 2023-07-14 PROCEDURE — G0463 HOSPITAL OUTPT CLINIC VISIT: HCPCS | Performed by: INTERNAL MEDICINE

## 2023-07-14 PROCEDURE — 99214 OFFICE O/P EST MOD 30 MIN: CPT | Performed by: INTERNAL MEDICINE

## 2023-07-14 RX ADMIN — DEGARELIX 240 MG: KIT at 15:19

## 2023-07-14 ASSESSMENT — PAIN SCALES - GENERAL: PAINLEVEL: NO PAIN (0)

## 2023-07-14 NOTE — TELEPHONE ENCOUNTER
"Routing refill request to provider for review/approval because:  BP out of range    Last Written Prescription Date: 6-15-22  Last Fill Quantity: 180,  # refills: 3   Last office visit provider:  5-15-23     Requested Prescriptions   Pending Prescriptions Disp Refills     carvedilol (COREG) 25 MG tablet 180 tablet 3     Sig: Take 1 tablet (25 mg) by mouth 2 times daily (with meals)       Beta-Blockers Protocol Failed - 7/14/2023 12:41 PM        Failed - Blood pressure under 140/90 in past 12 months     BP Readings from Last 3 Encounters:   07/14/23 (!) 146/74   06/07/23 (!) 154/74   05/15/23 128/66                 Passed - Patient is age 6 or older        Passed - Recent (12 mo) or future (30 days) visit within the authorizing provider's specialty     Patient has had an office visit with the authorizing provider or a provider within the authorizing providers department within the previous 12 mos or has a future within next 30 days. See \"Patient Info\" tab in inbasket, or \"Choose Columns\" in Meds & Orders section of the refill encounter.              Passed - Medication is active on med list             Laney Ha RN 07/14/23 2:43 PM  "

## 2023-07-14 NOTE — PROGRESS NOTES
Pt here ambulatory for injection. Reviewed with pt injection. Injection administered in two injections to right and  left abdomen and bandaids to sites. Follow up reviewed and pt dc'd steady gait. PT tolerated inj without any problems.

## 2023-07-14 NOTE — LETTER
7/14/2023         RE: Manas Martinez  [address]  [facility] [address]        Dear Colleague,    Thank you for referring your patient, Manas Martinez, to the [facility] [employer]. Please see a copy of my visit note below.    [facility] Hematology and Oncology Progress Note    Patient: Manas Martinez  MRN: [id number]  Date of Service: Jul 14, 2023        Assessment and Plan:    1.  Prostate cancer:        PSA recurrence. PSA on 6/7/23 was 2.83. 0.27 in August 2022. He has contacted his providers at [place] and has a PET scan scheuled for this week, I believe. I told him we will likely restart Eligard if the PET is unremarkable and Eligard and abiraterone if there is evidence of metastatic disease. He had a CT done on May 2nd, 2023 which showed no evidence of metastases.      2.  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    3.  Left renal mass/cyst:  Stable simple cyst on recent imaging. No followup is recommended.           ECOG Performance  0    Diagnosis:    1.  Metastatic prostate cancer: Biopsy of a retroperitoneal node in December 2019 showed adenocarcinoma.  CT scan showed adenopathy infolding the aortic bifurcation and mild pelvic .  adenopathy along the internal iliac chains bilaterally.  Bone scan at diagnosis was negative.  PET scan showed avid bilateral external iliac, left common iliac, aortocaval, and left retroperitoneal lymphadenopathy.  The most cephalad note is a 15 x 12 mm aortocaval node immediately anterior to L3.  Prostate MRI shows cancer involving almost the entire gland with broad capsular blurring without obvious evidence of extracapsular extension.  Prostate biopsy shows Rosa grade 5+4 = 9, the majority appearing to be 4+4 = 8.  PSA at diagnosis was around 4.0.    Treatment:    Initially given Firmagon on December 11, 2019.     He received radiation to the prostate and pelvic nodes at the [facility].  Definitive radiotherapy to 70.2  Gy in 26 fractions of IMRT to the prostate, as well as treatment of the pelvic and para-aortic lymph nodes. Treatment dates were June 8 through July 14, 2020.     Eligard q3 months and apalutamide started in January 2020.  We held the apalutamide in July, 2020 given elevated cholesterol and hypertension.  2 years of Eligard completed in 12/2021.    Interim History:    Manas returns today for a follow up visit. He was last seen in our clinic in December, 2021. Since then he has recently been discovered to have a rising PSA. He comes back for evaluation. He is feeling oko. Denies pain or woresening fatigue.    Review of Systems:    As above in the history.     Review of Systems otherwise Negative for:  General: chills, fever or night sweats  Psychological: anxiety or depression  Ophthalmic: blurry vision, double vision or loss of vision, vision change  ENT: epistaxis, oral lesions, hearing changes  Hematological and Lymphatic: bleeding, bruising, jaundice, swollen lymph nodes  Endocrine: hot flashes, unexpected weight changes  Respiratory: cough, hemoptysis, orthopnea or shortness of breath/CROCKER  Cardiovascular: chest pain, edema, palpitations or PND  Gastrointestinal: abdominal pain, blood in stools, change in bowel habits, constipation, diarrhea or nausea/vomiting  Genito-Urinary: change in urinary stream, incontinence, frequency/urgency  Musculoskeletal: joint pain, stiffness, swelling, muscle pain  Neurological: dizziness, headaches, numbness/tingling  Dermatological: lumps and rash    Past History:    Past Medical History:   Diagnosis Date     Adenocarcinoma of prostate (H)      Allergic rhinitis      Cancer (H)      Coronary artery disease      Deviated septum      Elevated PSA      Hyperlipemia      Hypertension      Joint pain      Myocardial infarction (H) 1999    Non Q wave MI, RCA     Obesity      Other asplenic status      Prediabetes      Sleep apnea      Urinary frequency      Physical Exam:    There were  no vitals taken for this visit.    General: patient appears stated age of 63 year old. Nontoxic and in no distress.   HEENT: Head: atraumatic, normocephalic. Sclerae anicteric.  Chest:  Normal respiratory effort  Cardiac:  No edema.   Abdomen: abdomen is soft, non-distended  Extremities: normal tone and muscle bulk.  Skin: no lesions or rash on visible skin. Warm and dry.   CNS: alert and oriented. Grossly non-focal.   Psychiatric: normal mood and affect.     Lab Results:    No results found for this or any previous visit (from the past 168 hour(s)).  Imaging:    PET CT Skull to Thigh PSMA    Result Date: 6/17/2023  EXAM:  PET CT SKULL TO THIGH PSMA RADIOPHARMACEUTICAL/MEDS: Route: intravenous gallium Ga 68 gozetotide injection (Ga-68 Illuccix/Locametz),5.04 millicurie TECHNIQUE:  PSMA-targeted PET/CT scan was performed from the vertex through the upper thighs with low dose, non-contrast, free-breathing CT images for attenuation correction and anatomic localization (AC/AL), with imaging beginning at approximately 60 minutes after radiotracer injection.     COMPARISON:  Previous F-18 Fluciclovine PET scan dated 01/08/2020. MR the prostate dated 06/16/2023. INDICATION:  Restaging prostate carcinoma. Recurrence suspected. Previous radiation therapy. Rising PSA. Subsequent treatment strategy. PSA: 3.8 ng/mL on 06/15/2023. The patient reports no recent vaccinations. FINDINGS: PROSTATE: There is mild irregular increased PSMA uptake seen throughout the prostate more prominent posteriorly (PET/CT image 307). There is also increased PSMA uptake seen in the seminal vesicles bilaterally more prominent on the left (PET/CT image 301) correlating with the findings on the MR scan from 06/16/2023 and indicating likely tumor involvement. The prior PET scan from 01/08/2020 had increased Fluciclovine throughout the prostate gland involving both seminal vesicles more prominent on the right. LYMPH NODES: There is mild indeterminate PSMA  uptake seen in a small left common iliac node lying just medial to the left psoas (PET/CT image 262) that is new over the interval from the previous scan. No other possible increased PSMA uptake is seen involving lymph nodes. OSSEOUS DISEASE: No evidence of bone metastasis OTHER METASTATIC DISEASE: None Additional findings on the noncontrast low-dose CT: Vascular calcifications including coronary arteries. Degenerative changes most prominent in the lumbar and thoracic spine.    Increased PSMA uptake in the prostate extending into the seminal vesicles bilaterally more prominent on the left is worrisome for tumor recurrence. Indeterminate mild PSMA uptake in a small left common iliac node possibly representing yvon metastasis. No other abnormal PSMA uptake is seen to indicate tumor recurrence or metastasis. miPSMA Expression Score: 1    MR Prostate  w/o & w Contrast    Result Date: 6/16/2023  EXAM: MR PROSTATE WITHOUT AND WITH IV CONTRAST CLINICAL HISTORY: History of prostate cancer, post radiation.. Most Recent PSA 3.8 ng/mL on 06/15/2023. COMPARISON: Outside MR prostate on 10/25/2019 PROSTATE:. Heterogeneous diffuse low signal intensity in the prostate, status post radiation. Low signal intensity with enhancement in the left posterior base to seminal vesicles bilaterally greater on the left (3.1 x 1.5 x 2.7 cm, 6.5 cc), suspicious for tumor recurrence. Contiguous central gland enhancement extending anteriorly in the base to mid gland, indeterminate for local recurrence. LYMPH NODES: Negative for suspicious lymph node(s). Previously seen enlarged lymph nodes are not measurable on current study. No enlarged lymph nodes.   BONES: Negative for suspicious bone lesion(s). Focus of sclerosis in the left iliac bone, stable when compared to prior exams, likely benign (series 3, image 14). OTHER FINDINGS: Partially visualized mild fat haziness in the small bowel mesentery in the mildly prominent lymph nodes, nonspecific.  "PROSTATE MRI TECHNIQUE: Multiparametric MRI of the prostate was performed at [facility] with surface coil. High resolution T2WI, DWI/ADC, and DCE imaging with IV contrast performed.    1.  Suspicious for local recurrence. 2.  Negative for lymph node metastasis. 3.  Negative for bone metastasis. Correlate with pending PET/CT.        Signed by: Miguel Cordon MD      Oncology Rooming Note    July 14, 2023 2:27 PM   Manas Martinez is a 63 year old male who presents for:    Chief Complaint   Patient presents with     Oncology Clinic Visit     Return Prostate cancer metastatic to intrapelvic lymph node, discuss treatment. Eligard Injection today.      Initial Vitals: BP (!) 146/74 (BP Location: Left arm, Patient Position: Sitting, Cuff Size: Adult Large)   Pulse 71   Temp 98.3  F (36.8  C) (Oral)   Resp 20   Ht 1.727 m (5' 8\")   Wt 130.2 kg (287 lb 1.6 oz)   SpO2 95%   BMI 43.65 kg/m   Estimated body mass index is 43.65 kg/m  as calculated from the following:    Height as of this encounter: 1.727 m (5' 8\").    Weight as of this encounter: 130.2 kg (287 lb 1.6 oz). Body surface area is 2.5 meters squared.  No Pain (0) Comment: Data Unavailable   No LMP for male patient.  Allergies reviewed: Yes  Medications reviewed: Yes    Medications: Medication refills not needed today.  Pharmacy name entered into [facility]:    [facility] #[id number] - [place], [place] - [address] AT [facility] [address]  [facility] (SPECIAL[facility]) [address]    Clinical concerns:  Return Prostate cancer metastatic to intrapelvic lymph node, discuss treatment. Eligard Injection today.      Soo Devi CMA                [facility] Hematology and Oncology Progress Note    Patient: Manas Martinez  MRN: [id number]  Date of Service: Jul 14, 2023        Assessment and Plan:    1.  Prostate cancer: PSA recurrence.  PET scan from June 17 also shows increased uptake in the " prostate gland and extending into the seminal vesicles bilaterally.  Possible uptake in a left common iliac node as well.  He was seen down at [place].  They are systemic therapy.  No surgery or radiation at this time.  He prefers to get a dose of Firmagon which we will do today and then start Eligard in a month.  He does not want to start antiandrogen therapy today.  He prefers to wait until he has progression.  Return to clinic in a month for Eligard and then I will see him in 4 months when he is back for his subsequent Eligard shot.  Questions answered.     ECOG Performance  0    Diagnosis:    1.  Metastatic prostate cancer: Biopsy of a retroperitoneal node in December 2019 showed adenocarcinoma.  CT scan showed adenopathy infolding the aortic bifurcation and mild pelvic .  adenopathy along the internal iliac chains bilaterally.  Bone scan at diagnosis was negative.  PET scan showed avid bilateral external iliac, left common iliac, aortocaval, and left retroperitoneal lymphadenopathy.  The most cephalad note is a 15 x 12 mm aortocaval node immediately anterior to L3.  Prostate MRI shows cancer involving almost the entire gland with broad capsular blurring without obvious evidence of extracapsular extension.  Prostate biopsy shows [place] grade 5+4 = 9, the majority appearing to be 4+4 = 8.  PSA at diagnosis was around 4.0.    Treatment:    Initially given Firmagon on December 11, 2019.     He received radiation to the prostate and pelvic nodes at the [facility].  Definitive radiotherapy to 70.2 Gy in 26 fractions of IMRT to the prostate, as well as treatment of the pelvic and para-aortic lymph nodes. Treatment dates were June 8 through July 14, 2020.     Eligard q3 months and apalutamide started in January 2020.  We held the apalutamide in July, 2020 given elevated cholesterol and hypertension.  2 years of Eligard completed in 12/2021.    Interim History:    Manas returns today for a follow up visit. He was last  seen in our clinic in December, 2021. Since then he has recently been discovered to have a rising PSA. He comes back for evaluation. He is feeling oko. Denies pain or woresening fatigue.    Review of Systems:    As above in the history.     Review of Systems otherwise Negative for:  General: chills, fever or night sweats  Psychological: anxiety or depression  Ophthalmic: blurry vision, double vision or loss of vision, vision change  ENT: epistaxis, oral lesions, hearing changes  Hematological and Lymphatic: bleeding, bruising, jaundice, swollen lymph nodes  Endocrine: hot flashes, unexpected weight changes  Respiratory: cough, hemoptysis, orthopnea or shortness of breath/CROCKER  Cardiovascular: chest pain, edema, palpitations or PND  Gastrointestinal: abdominal pain, blood in stools, change in bowel habits, constipation, diarrhea or nausea/vomiting  Genito-Urinary: change in urinary stream, incontinence, frequency/urgency  Musculoskeletal: joint pain, stiffness, swelling, muscle pain  Neurological: dizziness, headaches, numbness/tingling  Dermatological: lumps and rash    Past History:    Past Medical History:   Diagnosis Date     Adenocarcinoma of prostate (H)      Allergic rhinitis      Cancer (H)      Coronary artery disease      Deviated septum      Elevated PSA      Hyperlipemia      Hypertension      Joint pain      Myocardial infarction (H) 1999    Non Q wave MI, RCA     Obesity      Other asplenic status      Prediabetes      Sleep apnea      Urinary frequency      Physical Exam:    There were no vitals taken for this visit.    General: patient appears stated age of 63 year old. Nontoxic and in no distress.   HEENT: Head: atraumatic, normocephalic. Sclerae anicteric.  Chest:  Normal respiratory effort  Cardiac:  No edema.   Abdomen: abdomen is soft, non-distended  Extremities: normal tone and muscle bulk.  Skin: no lesions or rash on visible skin. Warm and dry.   CNS: alert and oriented. Grossly non-focal.    Psychiatric: normal mood and affect.     Lab Results:    No results found for this or any previous visit (from the past 168 hour(s)).  Imaging:    PET CT Skull to Thigh PSMA    Result Date: 6/17/2023  EXAM:  PET CT SKULL TO THIGH PSMA RADIOPHARMACEUTICAL/MEDS: Route: intravenous gallium Ga 68 gozetotide injection (Ga-68 Illuccix/Locametz),5.04 millicurie TECHNIQUE:  PSMA-targeted PET/CT scan was performed from the vertex through the upper thighs with low dose, non-contrast, free-breathing CT images for attenuation correction and anatomic localization (AC/AL), with imaging beginning at approximately 60 minutes after radiotracer injection.     COMPARISON:  Previous F-18 Fluciclovine PET scan dated 01/08/2020. MR the prostate dated 06/16/2023. INDICATION:  Restaging prostate carcinoma. Recurrence suspected. Previous radiation therapy. Rising PSA. Subsequent treatment strategy. PSA: 3.8 ng/mL on 06/15/2023. The patient reports no recent vaccinations. FINDINGS: PROSTATE: There is mild irregular increased PSMA uptake seen throughout the prostate more prominent posteriorly (PET/CT image 307). There is also increased PSMA uptake seen in the seminal vesicles bilaterally more prominent on the left (PET/CT image 301) correlating with the findings on the MR scan from 06/16/2023 and indicating likely tumor involvement. The prior PET scan from 01/08/2020 had increased Fluciclovine throughout the prostate gland involving both seminal vesicles more prominent on the right. LYMPH NODES: There is mild indeterminate PSMA uptake seen in a small left common iliac node lying just medial to the left psoas (PET/CT image 262) that is new over the interval from the previous scan. No other possible increased PSMA uptake is seen involving lymph nodes. OSSEOUS DISEASE: No evidence of bone metastasis OTHER METASTATIC DISEASE: None Additional findings on the noncontrast low-dose CT: Vascular calcifications including coronary arteries.  Degenerative changes most prominent in the lumbar and thoracic spine.    Increased PSMA uptake in the prostate extending into the seminal vesicles bilaterally more prominent on the left is worrisome for tumor recurrence. Indeterminate mild PSMA uptake in a small left common iliac node possibly representing yvon metastasis. No other abnormal PSMA uptake is seen to indicate tumor recurrence or metastasis. miPSMA Expression Score: 1    MR Prostate  w/o & w Contrast    Result Date: 6/16/2023  EXAM: MR PROSTATE WITHOUT AND WITH IV CONTRAST CLINICAL HISTORY: History of prostate cancer, post radiation.. Most Recent PSA 3.8 ng/mL on 06/15/2023. COMPARISON: Outside MR prostate on 10/25/2019 PROSTATE:. Heterogeneous diffuse low signal intensity in the prostate, status post radiation. Low signal intensity with enhancement in the left posterior base to seminal vesicles bilaterally greater on the left (3.1 x 1.5 x 2.7 cm, 6.5 cc), suspicious for tumor recurrence. Contiguous central gland enhancement extending anteriorly in the base to mid gland, indeterminate for local recurrence. LYMPH NODES: Negative for suspicious lymph node(s). Previously seen enlarged lymph nodes are not measurable on current study. No enlarged lymph nodes.   BONES: Negative for suspicious bone lesion(s). Focus of sclerosis in the left iliac bone, stable when compared to prior exams, likely benign (series 3, image 14). OTHER FINDINGS: Partially visualized mild fat haziness in the small bowel mesentery in the mildly prominent lymph nodes, nonspecific. PROSTATE MRI TECHNIQUE: Multiparametric MRI of the prostate was performed at [facility] with surface coil. High resolution T2WI, DWI/ADC, and DCE imaging with IV contrast performed.    1.  Suspicious for local recurrence. 2.  Negative for lymph node metastasis. 3.  Negative for bone metastasis. Correlate with pending PET/CT.        Signed by: Miguel Cordon MD      Again, thank you for allowing me to participate  in the care of your patient.        Sincerely,        Miguel Cordon MD

## 2023-07-14 NOTE — PROGRESS NOTES
"Oncology Rooming Note    July 14, 2023 2:27 PM   Manas Martinez is a 63 year old male who presents for:    Chief Complaint   Patient presents with    Oncology Clinic Visit     Return Prostate cancer metastatic to intrapelvic lymph node, discuss treatment. Eligard Injection today.      Initial Vitals: BP (!) 146/74 (BP Location: Left arm, Patient Position: Sitting, Cuff Size: Adult Large)   Pulse 71   Temp 98.3  F (36.8  C) (Oral)   Resp 20   Ht 1.727 m (5' 8\")   Wt 130.2 kg (287 lb 1.6 oz)   SpO2 95%   BMI 43.65 kg/m   Estimated body mass index is 43.65 kg/m  as calculated from the following:    Height as of this encounter: 1.727 m (5' 8\").    Weight as of this encounter: 130.2 kg (287 lb 1.6 oz). Body surface area is 2.5 meters squared.  No Pain (0) Comment: Data Unavailable   No LMP for male patient.  Allergies reviewed: Yes  Medications reviewed: Yes    Medications: Medication refills not needed today.  Pharmacy name entered into [employer]:    [facility] #[id number] - [place], [place] - [address] AT [facility] [address]  [facility] [place], [place] - [address] [facility]    Clinical concerns:  Return Prostate cancer metastatic to intrapelvic lymph node, discuss treatment. Eligard Injection today.      Soo Devi CMA              "

## 2023-07-14 NOTE — PROGRESS NOTES
[facility] Hematology and Oncology Progress Note    Patient: Manas Martinez  MRN: [id number]  Date of Service: Jul 14, 2023        Assessment and Plan:    1.  Prostate cancer: PSA recurrence.  PET scan from June 17 also shows increased uptake in the prostate gland and extending into the seminal vesicles bilaterally.  Possible uptake in a left common iliac node as well.  He was seen down at [place].  They are recommending systemic therapy.  No surgery or radiation at this time.  He prefers to get a dose of Firmagon which we will do today and then start Eligard in a month.  He does not want to start antiandrogen therapy today.  He prefers to wait until he has progression.  Return to clinic in a month for Eligard and then I will see him in 4 months when he is back for his subsequent Eligard shot.  Questions answered.     ECOG Performance  0    Diagnosis:    1.  Metastatic prostate cancer: Biopsy of a retroperitoneal node in December 2019 showed adenocarcinoma.  CT scan showed adenopathy infolding the aortic bifurcation and mild pelvic .  adenopathy along the internal iliac chains bilaterally.  Bone scan at diagnosis was negative.  PET scan showed avid bilateral external iliac, left common iliac, aortocaval, and left retroperitoneal lymphadenopathy.  The most cephalad note is a 15 x 12 mm aortocaval node immediately anterior to L3.  Prostate MRI shows cancer involving almost the entire gland with broad capsular blurring without obvious evidence of extracapsular extension.  Prostate biopsy shows [place] grade 5+4 = 9, the majority appearing to be 4+4 = 8.  PSA at diagnosis was around 4.0.    Treatment:    Initially given Firmagon on December 11, 2019.     He received radiation to the prostate and pelvic nodes at the [facility].  Definitive radiotherapy to 70.2 Gy in 26 fractions of IMRT to the prostate, as well as treatment of the pelvic and para-aortic lymph nodes. Treatment dates were June 8 through July 14,  "2020.     Eligard q3 months and apalutamide started in January 2020.  We held the apalutamide in July, 2020 given elevated cholesterol and hypertension.  2 years of Eligard completed in 12/2021.    Interim History:    Manas returns today for a follow up visit.  He is here for short-term follow-up visit to discuss his treatment plan.  He was seen at the [facility] a few weeks ago.  He is not having any new complaints today.  No pain.    Review of Systems:    As above in the history.     Review of Systems otherwise Negative for:  General: chills, fever or night sweats  Psychological: anxiety or depression  Ophthalmic: blurry vision, double vision or loss of vision, vision change  ENT: epistaxis, oral lesions, hearing changes  Hematological and Lymphatic: bleeding, bruising, jaundice, swollen lymph nodes  Endocrine: hot flashes, unexpected weight changes  Respiratory: cough, hemoptysis, orthopnea or shortness of breath/CROCKER  Cardiovascular: chest pain, edema, palpitations or PND  Gastrointestinal: abdominal pain, blood in stools, change in bowel habits, constipation, diarrhea or nausea/vomiting  Genito-Urinary: change in urinary stream, incontinence, frequency/urgency  Musculoskeletal: joint pain, stiffness, swelling, muscle pain  Neurological: dizziness, headaches, numbness/tingling  Dermatological: lumps and rash    Past History:    Past Medical History:   Diagnosis Date    Adenocarcinoma of prostate (H)     Allergic rhinitis     Cancer (H)     Coronary artery disease     Deviated septum     Elevated PSA     Hyperlipemia     Hypertension     Joint pain     Myocardial infarction (H) 1999    Non Q wave MI, RCA    Obesity     Other asplenic status     Prediabetes     Sleep apnea     Urinary frequency      Physical Exam:    BP (!) 146/74 (BP Location: Left arm, Patient Position: Sitting, Cuff Size: Adult Large)   Pulse 71   Temp 98.3  F (36.8  C) (Oral)   Resp 20   Ht 1.727 m (5' 8\")   Wt 130.2 kg (287 lb 1.6 oz)   " SpO2 95%   BMI 43.65 kg/m      General: patient appears stated age of 63 year old. Nontoxic and in no distress.   HEENT: Head: atraumatic, normocephalic. Sclerae anicteric.  Chest:  Normal respiratory effort  Cardiac:  No edema.   Abdomen: abdomen is soft, non-distended  Extremities: normal tone and muscle bulk.  Skin: no lesions or rash on visible skin. Warm and dry.   CNS: alert and oriented. Grossly non-focal.   Psychiatric: normal mood and affect.     Lab Results:    No results found for this or any previous visit (from the past 168 hour(s)).    Imaging:    No results found.      Signed by: Miguel Cordon MD

## 2023-07-17 RX ORDER — CARVEDILOL 25 MG/1
25 TABLET ORAL 2 TIMES DAILY WITH MEALS
Qty: 180 TABLET | Refills: 3 | Status: SHIPPED | OUTPATIENT
Start: 2023-07-17 | End: 2024-07-23

## 2023-07-26 ENCOUNTER — OFFICE VISIT (OUTPATIENT)
Dept: OTOLARYNGOLOGY | Facility: CLINIC | Age: 63
End: 2023-07-26
Payer: MEDICARE

## 2023-07-26 DIAGNOSIS — J32.4 CHRONIC PANSINUSITIS: ICD-10-CM

## 2023-07-26 DIAGNOSIS — R09.81 NASAL CONGESTION: Primary | ICD-10-CM

## 2023-07-26 PROCEDURE — 99214 OFFICE O/P EST MOD 30 MIN: CPT | Performed by: OTOLARYNGOLOGY

## 2023-07-26 RX ORDER — BUDESONIDE 0.5 MG/2ML
0.5 INHALANT ORAL DAILY
Qty: 180 ML | Refills: 0 | Status: SHIPPED | OUTPATIENT
Start: 2023-07-26 | End: 2024-05-17

## 2023-07-26 RX ORDER — AZELASTINE 1 MG/ML
SPRAY, METERED NASAL
Qty: 30 ML | Refills: 11 | Status: SHIPPED | OUTPATIENT
Start: 2023-07-26 | End: 2024-05-17

## 2023-07-26 NOTE — PATIENT INSTRUCTIONS
Budesonide rinses as directed  Return visit for nasal endoscopy at [facility].

## 2023-07-26 NOTE — PROGRESS NOTES
CHIEF COMPLAINT:  Patient presents with:  Consult: CT 5/23,  History of Nasal polyps, Chronic Sinusitis, No symptoms now, was experiencing severe post nasal drip, productive cough, loss of taste and smell, SNOT Total 51         HISTORY OF PRESENT ILLNESS    Manas was seen in follow up after previous 11/3/2022 visit for CT review. Patient return from [place] at the beginning of May.   His sense of smell is improved.   He has history of nasal polypectomy at [facility] 10 years ago.   Did not qualify for Nucala.  He is scheduled to see Dr. Pinedo in September.  He is currently being treated for recurrent prostate cancer.       Previous ENT note:      MEDICAL DECISION-MAKING: This patient is a 61yo M with recurrent nasal polyps. Discussed a new CT scan to assess his post-surgical anatomy and the extent of the recurrence. He is leaving to winter in [place] next week and will not return until spring. Given that this is a recurrence of polyps and he will be away for several months, I think Nucala would be a reasonable option for him. Will send in the Rx     Sino-Nasal Outcome Test (SNOT - 22)    1. Need to Blow Nose: (P) Severe  2. Nasal Blockage: (P) Severe  3. Sneezing: (P) Severe  4. Runny Nose: (P) Severe  5. Cough: (P) Severe  6. Post-nasal discharge: (P) Bad as it can be  7. Thick nasal discharge: (P) Bad as it can be  8. Ear fullness: (P) None  9. Dizziness: (P) None  10. Ear Pain: (P) None  11. Facial pain/pressure: (P) Very mild  12. Decreased Sense of Smell/Taste: (P) Bad as it can be  13. Difficulty falling asleep: (P) Severe  14. Wake up at night: (P) Moderate  15. Lack of a good night's sleep: (P) Severe  16. Wake up tired: (P) Mild or slight  17. Fatigue: (P) Very mild  18. Reduced Productivity: (P) Very mild  19. Reduced Concentration: (P) None  20. Frustrated/restless/irritable: (P) None  21. Sad: (P) None  22. Embarrassed: (P) None    Total Score: (P) 51    COPYRIGHT 1996. [facility] [place]          REVIEW OF SYSTEMS    Review of Systems: a 10-system review is reviewed at this encounter.  See scanned document.       Allergies   Allergen Reactions    Atorvastatin Other (See Comments)     Leg Cramps           PHYSICAL EXAM:        HEAD: Normal appearance and symmetry:  No cutaneous lesions.        NOSE:    Dorsum:   straight  Septum midline  ITH: 3+        ORAL CAVITY/OROPHARYNX:    Lips:  Normal.     NECK:  Trachea:  midline     NEURO:   Alert and Oriented    GAIT AND STATION:  normal     RESPIRATORY:   Symmetry and Respiratory effort    PSYCH:   normal mood and affect    SKIN:  warm and dry         IMPRESSION:   Encounter Diagnoses   Name Primary?    Nasal congestion Yes    Chronic pansinusitis             RECOMMENDATIONS:    No orders of the defined types were placed in this encounter.     Orders Placed This Encounter   Medications    azelastine (ASTELIN) 0.1 % nasal spray     Si sprays each nostril 1-2x daily as needed for nasal congestion (use nightly for first 2 week)     Dispense:  30 mL     Refill:  11    budesonide (PULMICORT) 0.5 MG/2ML neb solution     Sig: Take 2 mLs (0.5 mg) by nebulization daily for 90 days     Dispense:  180 mL     Refill:  0     Add one vial to 240 ml of nasal saline and rinse daily     Return visit in  at [place] office for nasal endoscopy    Recommend medical therapy at this time.    Allergy referral in place.

## 2023-07-26 NOTE — NURSING NOTE
Sino-Nasal Outcome Test (SNOT - 22)    1. Need to Blow Nose: (P) Severe  2. Nasal Blockage: (P) Severe  3. Sneezing: (P) Severe  4. Runny Nose: (P) Severe  5. Cough: (P) Severe  6. Post-nasal discharge: (P) Bad as it can be  7. Thick nasal discharge: (P) Bad as it can be  8. Ear fullness: (P) None  9. Dizziness: (P) None  10. Ear Pain: (P) None  11. Facial pain/pressure: (P) Very mild  12. Decreased Sense of Smell/Taste: (P) Bad as it can be  13. Difficulty falling asleep: (P) Severe  14. Wake up at night: (P) Moderate  15. Lack of a good night's sleep: (P) Severe  16. Wake up tired: (P) Mild or slight  17. Fatigue: (P) Very mild  18. Reduced Productivity: (P) Very mild  19. Reduced Concentration: (P) None  20. Frustrated/restless/irritable: (P) None  21. Sad: (P) None  22. Embarrassed: (P) None    Total Score: (P) 51    COPYRIGHT 1996. [facility] [place]

## 2023-07-26 NOTE — LETTER
7/26/2023         RE: Manas Martinez  [address]  [facility] [address]        Dear Colleague,    Thank you for referring your patient, Manas Martinez, to the [facility]. Please see a copy of my visit note below.    CHIEF COMPLAINT:  Patient presents with:  Consult: CT 5/23,  History of Nasal polyps, Chronic Sinusitis, No symptoms now, was experiencing severe post nasal drip, productive cough, loss of taste and smell, SNOT Total 51         HISTORY OF PRESENT ILLNESS    Manas was seen in follow up after previous 11/3/2022 visit for CT review. Patient return from [place] at the beginning of May.   His sense of smell is improved.   He has history of nasal polypectomy at [facility] 10 years ago.   Did not qualify for Nucala.  He is scheduled to see Dr. Pinedo in September.  He is currently being treated for recurrent prostate cancer.       Previous ENT note:      MEDICAL DECISION-MAKING: This patient is a 61yo M with recurrent nasal polyps. Discussed a new CT scan to assess his post-surgical anatomy and the extent of the recurrence. He is leaving to winter in [place] next week and will not return until spring. Given that this is a recurrence of polyps and he will be away for several months, I think Nucala would be a reasonable option for him. Will send in the Rx     Sino-Nasal Outcome Test (SNOT - 22)    1. Need to Blow Nose: (P) Severe  2. Nasal Blockage: (P) Severe  3. Sneezing: (P) Severe  4. Runny Nose: (P) Severe  5. Cough: (P) Severe  6. Post-nasal discharge: (P) Bad as it can be  7. Thick nasal discharge: (P) Bad as it can be  8. Ear fullness: (P) None  9. Dizziness: (P) None  10. Ear Pain: (P) None  11. Facial pain/pressure: (P) Very mild  12. Decreased Sense of Smell/Taste: (P) Bad as it can be  13. Difficulty falling asleep: (P) Severe  14. Wake up at night: (P) Moderate  15. Lack of a good night's sleep: (P) Severe  16. Wake up tired: (P) Mild or slight  17. Fatigue: (P) Very mild  18.  Reduced Productivity: (P) Very mild  19. Reduced Concentration: (P) None  20. Frustrated/restless/irritable: (P) None  21. Sad: (P) None  22. Embarrassed: (P) None    Total Score: (P) 51    COPYRIGHT . [facility] [place] [facility],[place]         REVIEW OF SYSTEMS    Review of Systems: a 10-system review is reviewed at this encounter.  See scanned document.       Allergies   Allergen Reactions     Atorvastatin Other (See Comments)     Leg Cramps           PHYSICAL EXAM:        HEAD: Normal appearance and symmetry:  No cutaneous lesions.        NOSE:    Dorsum:   straight  Septum midline  ITH: 3+        ORAL CAVITY/OROPHARYNX:    Lips:  Normal.     NECK:  Trachea:  midline     NEURO:   Alert and Oriented    GAIT AND STATION:  normal     RESPIRATORY:   Symmetry and Respiratory effort    PSYCH:   normal mood and affect    SKIN:  warm and dry         IMPRESSION:   Encounter Diagnoses   Name Primary?     Nasal congestion Yes     Chronic pansinusitis             RECOMMENDATIONS:    No orders of the defined types were placed in this encounter.     Orders Placed This Encounter   Medications     azelastine (ASTELIN) 0.1 % nasal spray     Si sprays each nostril 1-2x daily as needed for nasal congestion (use nightly for first 2 week)     Dispense:  30 mL     Refill:  11     budesonide (PULMICORT) 0.5 MG/2ML neb solution     Sig: Take 2 mLs (0.5 mg) by nebulization daily for 90 days     Dispense:  180 mL     Refill:  0     Add one vial to 240 ml of nasal saline and rinse daily     Return visit in  at [place] office for nasal endoscopy    Recommend medical therapy at this time.    Allergy referral in place.        Again, thank you for allowing me to participate in the care of your patient.        Sincerely,        Benjamin Powell MD

## 2023-08-02 DIAGNOSIS — C77.5 PROSTATE CANCER METASTATIC TO INTRAPELVIC LYMPH NODE (H): Primary | ICD-10-CM

## 2023-08-02 DIAGNOSIS — C61 PROSTATE CANCER METASTATIC TO INTRAPELVIC LYMPH NODE (H): Primary | ICD-10-CM

## 2023-08-06 ENCOUNTER — HEALTH MAINTENANCE LETTER (OUTPATIENT)
Age: 63
End: 2023-08-06

## 2023-08-09 ENCOUNTER — MYC MEDICAL ADVICE (OUTPATIENT)
Dept: INTERNAL MEDICINE | Facility: CLINIC | Age: 63
End: 2023-08-09
Payer: MEDICARE

## 2023-08-09 DIAGNOSIS — R73.09 OTHER ABNORMAL GLUCOSE: Primary | ICD-10-CM

## 2023-08-09 DIAGNOSIS — E66.01 MORBID OBESITY (H): ICD-10-CM

## 2023-08-11 ENCOUNTER — INFUSION THERAPY VISIT (OUTPATIENT)
Dept: INFUSION THERAPY | Facility: HOSPITAL | Age: 63
End: 2023-08-11
Attending: INTERNAL MEDICINE
Payer: MEDICARE

## 2023-08-11 VITALS
DIASTOLIC BLOOD PRESSURE: 70 MMHG | RESPIRATION RATE: 18 BRPM | HEART RATE: 73 BPM | OXYGEN SATURATION: 100 % | SYSTOLIC BLOOD PRESSURE: 140 MMHG | TEMPERATURE: 98.2 F

## 2023-08-11 DIAGNOSIS — C61 PROSTATE CANCER METASTATIC TO INTRAPELVIC LYMPH NODE (H): ICD-10-CM

## 2023-08-11 DIAGNOSIS — C77.5 PROSTATE CANCER METASTATIC TO INTRAPELVIC LYMPH NODE (H): Primary | ICD-10-CM

## 2023-08-11 DIAGNOSIS — R73.09 OTHER ABNORMAL GLUCOSE: ICD-10-CM

## 2023-08-11 DIAGNOSIS — C61 PROSTATE CANCER METASTATIC TO INTRAPELVIC LYMPH NODE (H): Primary | ICD-10-CM

## 2023-08-11 DIAGNOSIS — R73.09 OTHER ABNORMAL GLUCOSE: Primary | ICD-10-CM

## 2023-08-11 DIAGNOSIS — C77.5 PROSTATE CANCER METASTATIC TO INTRAPELVIC LYMPH NODE (H): ICD-10-CM

## 2023-08-11 LAB
ANION GAP SERPL CALCULATED.3IONS-SCNC: 11 MMOL/L (ref 7–15)
BUN SERPL-MCNC: 31.4 MG/DL (ref 8–23)
CALCIUM SERPL-MCNC: 10.1 MG/DL (ref 8.8–10.2)
CHLORIDE SERPL-SCNC: 101 MMOL/L (ref 98–107)
CREAT SERPL-MCNC: 1.25 MG/DL (ref 0.67–1.17)
DEPRECATED HCO3 PLAS-SCNC: 25 MMOL/L (ref 22–29)
GFR SERPL CREATININE-BSD FRML MDRD: 64 ML/MIN/1.73M2
GLUCOSE SERPL-MCNC: 109 MG/DL (ref 70–99)
HBA1C MFR BLD: 6.3 %
POTASSIUM SERPL-SCNC: 4.7 MMOL/L (ref 3.4–5.3)
PSA SERPL DL<=0.01 NG/ML-MCNC: 0.24 NG/ML (ref 0–4.5)
SODIUM SERPL-SCNC: 137 MMOL/L (ref 136–145)

## 2023-08-11 PROCEDURE — 84153 ASSAY OF PSA TOTAL: CPT

## 2023-08-11 PROCEDURE — 82374 ASSAY BLOOD CARBON DIOXIDE: CPT

## 2023-08-11 PROCEDURE — 250N000011 HC RX IP 250 OP 636: Mod: JZ | Performed by: NURSE PRACTITIONER

## 2023-08-11 PROCEDURE — 82310 ASSAY OF CALCIUM: CPT

## 2023-08-11 PROCEDURE — 36415 COLL VENOUS BLD VENIPUNCTURE: CPT

## 2023-08-11 PROCEDURE — 96402 CHEMO HORMON ANTINEOPL SQ/IM: CPT

## 2023-08-11 PROCEDURE — 83036 HEMOGLOBIN GLYCOSYLATED A1C: CPT

## 2023-08-11 RX ADMIN — LEUPROLIDE ACETATE 22.5 MG: KIT SUBCUTANEOUS at 13:32

## 2023-08-11 NOTE — PROGRESS NOTES
Manas came to clinic this afternoon for Eligard.  VSS.  Pt assessed and is feeling well overall.  He does have some hot flashes and night sweats.  Eligard reviewed.  Ice applied to left upper outer buttock.  Eligard given to site subcutaneous.  He reports that this was much less painful than past injections.  He tolerated the injection well.  Manas left clinic ambulatory.

## 2023-08-14 NOTE — TELEPHONE ENCOUNTER
Currently on Ozempic 0.5mg weekly. Will send to provider for confirmation of next dose or continue at this dose with refill needed. Rx was last sent 5/16/23.      Last A1C 8/11/23 with result of 6.3

## 2023-09-18 ENCOUNTER — OFFICE VISIT (OUTPATIENT)
Dept: ALLERGY | Facility: CLINIC | Age: 63
End: 2023-09-18
Payer: MEDICARE

## 2023-09-18 VITALS — RESPIRATION RATE: 18 BRPM | OXYGEN SATURATION: 97 % | HEART RATE: 85 BPM | HEIGHT: 68 IN | BODY MASS INDEX: 43.65 KG/M2

## 2023-09-18 DIAGNOSIS — J32.0 CHRONIC MAXILLARY SINUSITIS: ICD-10-CM

## 2023-09-18 PROCEDURE — 99203 OFFICE O/P NEW LOW 30 MIN: CPT | Mod: 25 | Performed by: ALLERGY & IMMUNOLOGY

## 2023-09-18 PROCEDURE — 95004 PERQ TESTS W/ALRGNC XTRCS: CPT | Performed by: ALLERGY & IMMUNOLOGY

## 2023-09-18 NOTE — LETTER
9/18/2023         RE: Manas Martinez  [address]  [facility] [address]        Dear Colleague,    Thank you for referring your patient, Manas Martinez, to the [facility]. Please see a copy of my visit note below.        Subjective  Manas is a 63 year old, presenting for the following health issues:  Allergy Consult (Sinus congestion, runny nose in morning)    HPI     Chief complaint: Allergies    History of present illness: This is a pleasant 63-year-old gentleman I was asked to see for evaluation by Dr. Powell in regards to allergies.  Patient states that he had sinus symptoms for many years.  He would have nasal congestion runny nose in the morning.  He states about 7 years ago he had a nasal polyp removed.  His sense of smell has been decreased since that time he has had a lot of sinus symptoms.  He then states in the spring of this year he had the symptoms spontaneously resolved.  He then saw ENT and had a CT scan performed which demonstrated complete opacification of the ethmoid air cells and mild mucosal thickening of the maxillary sinuses.  He also had opacification of the frontal sinus drainage pathways and ostiomeatal complexes bilaterally per report.  He has an ENT appointment in October.  He states, however, right now he does not have any symptoms.  He thinks he has seasonal allergies.  He suspects other yeast or mold as he states when he drinks certain beers or breads he becomes congested.  He also notes that he lives on a boat in [place].  He thinks there may be some mold in the boat.  He he did have a cat up until the last year.  The cat has been removed from the home but he states that he lives with a cat on the boat as well as here in [place].  He states his symptoms were similar between here in [place] but they have now been resolved for about 3 months.  No history of asthma.  He does have a history of some eczema during the winter.    Past medical history:  "Hypertension, diabetes, prostate cancer history of coronary artery disease, splenectomy, adrenal adenoma status post adrenalectomy, chronic kidney disease    Social history: He is a , otherwise as listed in his present illness, former smoker, secondhand smoke exposure    Family history: Negative for asthma      Review of Systems   Constitutional, HEENT, cardiovascular, pulmonary, gi and gu systems are negative, except as otherwise noted.      Objective   Pulse 85   Resp 18   Ht 1.727 m (5' 8\")   SpO2 97%   BMI 43.65 kg/m    Body mass index is 43.65 kg/m .  Physical Exam   Gen: Pleasant male not in acute distress  HEENT: Eyes no erythema of the bulbar or palpebral conjunctiva, no edema. Ears: TMs well visualized, no effusions. Nose: No congestion, mucosa normal. Mouth: Throat clear, no lip or tongue edema.   Neck: No visible masses lesions or swelling  Respiratory: Clear to auscultation bilaterally, no adventitious breath sounds  Lymph: No supraclavicular or cervical lymphadenopathy  Skin: No rashes or lesions  Psych: Alert and oriented times 3      At today s visit the patient/parent and I engaged in an informed consent discussion about allergy testing.  We discussed skin testing, blood testing,  and the alternative of not undergoing any testing. The patient/ parent has a preference for skin testing. We then discussed the risks and benefits of skin testing.  The patient/ parent understands skin testing risks can include, but are not limited to, urticaria, angioedema, shortness of breath, and severe anaphylaxis.  The benefits include, but are not limited, to evaluation for allergens causing symptoms.  After answering the patients/parents questions they have agreed to proceed with skin testing.                  30 percutaneous test were undertaken to the environmental skin test panel.  Positive histamine control with a negative allergy skin test.  Please see scanned photograph.      Impression report and " plan:  1.  History of chronic sinusitis    Allergy testing was negative.  Patient has relatively little symptoms currently.  He will follow-up with the ENT for further management.  Follow-up as needed.      Again, thank you for allowing me to participate in the care of your patient.        Sincerely,        Kathleen CONLEY MD

## 2023-09-18 NOTE — PROGRESS NOTES
Vikki Malagon is a 63 year old, presenting for the following health issues:  Allergy Consult (Sinus congestion, runny nose in morning)    HPI     Chief complaint: Allergies    History of present illness: This is a pleasant 63-year-old gentleman I was asked to see for evaluation by Dr. Powell in regards to allergies.  Patient states that he had sinus symptoms for many years.  He would have nasal congestion runny nose in the morning.  He states about 7 years ago he had a nasal polyp removed.  His sense of smell has been decreased since that time he has had a lot of sinus symptoms.  He then states in the spring of this year he had the symptoms spontaneously resolved.  He then saw ENT and had a CT scan performed which demonstrated complete opacification of the ethmoid air cells and mild mucosal thickening of the maxillary sinuses.  He also had opacification of the frontal sinus drainage pathways and ostiomeatal complexes bilaterally per report.  He has an ENT appointment in October.  He states, however, right now he does not have any symptoms.  He thinks he has seasonal allergies.  He suspects other yeast or mold as he states when he drinks certain beers or breads he becomes congested.  He also notes that he lives on a boat in [place].  He thinks there may be some mold in the boat.  He he did have a cat up until the last year.  The cat has been removed from the home but he states that he lives with a cat on the boat as well as here in [place].  He states his symptoms were similar between here in [place] but they have now been resolved for about 3 months.  No history of asthma.  He does have a history of some eczema during the winter.    Past medical history: Hypertension, diabetes, prostate cancer history of coronary artery disease, splenectomy, adrenal adenoma status post adrenalectomy, chronic kidney disease    Social history: He is a , otherwise as listed in his present illness, former smoker,  "secondhand smoke exposure    Family history: Negative for asthma      Review of Systems   Constitutional, HEENT, cardiovascular, pulmonary, gi and gu systems are negative, except as otherwise noted.      Objective    Pulse 85   Resp 18   Ht 1.727 m (5' 8\")   SpO2 97%   BMI 43.65 kg/m    Body mass index is 43.65 kg/m .  Physical Exam   Gen: Pleasant male not in acute distress  HEENT: Eyes no erythema of the bulbar or palpebral conjunctiva, no edema. Ears: TMs well visualized, no effusions. Nose: No congestion, mucosa normal. Mouth: Throat clear, no lip or tongue edema.   Neck: No visible masses lesions or swelling  Respiratory: Clear to auscultation bilaterally, no adventitious breath sounds  Lymph: No supraclavicular or cervical lymphadenopathy  Skin: No rashes or lesions  Psych: Alert and oriented times 3      At today s visit the patient/parent and I engaged in an informed consent discussion about allergy testing.  We discussed skin testing, blood testing,  and the alternative of not undergoing any testing. The patient/ parent has a preference for skin testing. We then discussed the risks and benefits of skin testing.  The patient/ parent understands skin testing risks can include, but are not limited to, urticaria, angioedema, shortness of breath, and severe anaphylaxis.  The benefits include, but are not limited, to evaluation for allergens causing symptoms.  After answering the patients/parents questions they have agreed to proceed with skin testing.                  30 percutaneous test were undertaken to the environmental skin test panel.  Positive histamine control with a negative allergy skin test.  Please see scanned photograph.      Impression report and plan:  1.  History of chronic sinusitis    Allergy testing was negative.  Patient has relatively little symptoms currently.  He will follow-up with the ENT for further management.  Follow-up as needed.    "

## 2023-10-20 ENCOUNTER — TELEPHONE (OUTPATIENT)
Dept: ONCOLOGY | Facility: HOSPITAL | Age: 63
End: 2023-10-20
Payer: MEDICARE

## 2023-10-25 ENCOUNTER — OFFICE VISIT (OUTPATIENT)
Dept: INTERNAL MEDICINE | Facility: CLINIC | Age: 63
End: 2023-10-25
Payer: MEDICARE

## 2023-10-25 VITALS
BODY MASS INDEX: 42.21 KG/M2 | DIASTOLIC BLOOD PRESSURE: 70 MMHG | OXYGEN SATURATION: 96 % | WEIGHT: 278.5 LBS | SYSTOLIC BLOOD PRESSURE: 118 MMHG | HEART RATE: 85 BPM | RESPIRATION RATE: 16 BRPM | HEIGHT: 68 IN | TEMPERATURE: 98.1 F

## 2023-10-25 DIAGNOSIS — E66.01 MORBID OBESITY (H): ICD-10-CM

## 2023-10-25 DIAGNOSIS — E03.9 ACQUIRED HYPOTHYROIDISM: ICD-10-CM

## 2023-10-25 DIAGNOSIS — C77.5 PROSTATE CANCER METASTATIC TO INTRAPELVIC LYMPH NODE (H): ICD-10-CM

## 2023-10-25 DIAGNOSIS — N18.4 CKD (CHRONIC KIDNEY DISEASE) STAGE 4, GFR 15-29 ML/MIN (H): ICD-10-CM

## 2023-10-25 DIAGNOSIS — C61 PROSTATE CANCER METASTATIC TO INTRAPELVIC LYMPH NODE (H): ICD-10-CM

## 2023-10-25 DIAGNOSIS — R80.9 TYPE 2 DIABETES MELLITUS WITH MICROALBUMINURIA, WITHOUT LONG-TERM CURRENT USE OF INSULIN (H): Primary | ICD-10-CM

## 2023-10-25 DIAGNOSIS — E11.29 TYPE 2 DIABETES MELLITUS WITH MICROALBUMINURIA, WITHOUT LONG-TERM CURRENT USE OF INSULIN (H): Primary | ICD-10-CM

## 2023-10-25 PROCEDURE — 82043 UR ALBUMIN QUANTITATIVE: CPT | Performed by: INTERNAL MEDICINE

## 2023-10-25 PROCEDURE — 99214 OFFICE O/P EST MOD 30 MIN: CPT | Performed by: INTERNAL MEDICINE

## 2023-10-25 PROCEDURE — 82570 ASSAY OF URINE CREATININE: CPT | Performed by: INTERNAL MEDICINE

## 2023-10-25 RX ORDER — RESPIRATORY SYNCYTIAL VIRUS VACCINE 120MCG/0.5
0.5 KIT INTRAMUSCULAR ONCE
Qty: 1 EACH | Refills: 0 | Status: CANCELLED | OUTPATIENT
Start: 2023-10-25 | End: 2023-10-25

## 2023-10-25 NOTE — PROGRESS NOTES
"  Office Visit - Follow Up   Manas Martinez   63 year old male    Date of Visit: 10/25/2023    Chief Complaint   Patient presents with    RECHECK     Follow-up CKD, hypertension        Assessment and Plan   1. Type 2 diabetes mellitus with microalbuminuria, without long-term current use of insulin (H)  Continue Ozempic, when he finishes blood current supply he has I would recommend going up to 2 mg.  Discussed importance of excellent diabetic foot care and annual diabetic eye exam.  Continue aspirin and statin and ACE inhibitor    2. CKD (chronic kidney disease) stage 4, GFR 15-29 ml/min (H)  - Albumin Random Urine Quantitative with Creat Ratio; Future  - BASIC METABOLIC PANEL; Future  - Albumin Random Urine Quantitative with Creat Ratio    3. Acquired hypothyroidism  - TSH; Future    4. Prostate cancer metastatic to intrapelvic lymph node (H)  Following with Dr. Miguel Cordon, on androgen deprivation therapy    5. Morbid obesity (H)    The following high BMI interventions were performed this visit: encouragement to exercise and lifestyle education regarding diet    Return in about 6 months (around 4/25/2024) for Routine preventive.     History of Present Illness   This 63 year old man comes in for follow-up.  Since I saw him last he has started androgen deprivation therapy for metastatic prostate cancer.  He met with radiation oncology but this has not been recommended.  There is some discussion of chemotherapy he will be following up with Dr. Miguel Cordon prior to leaving to [place].  He is on Ozempic and has lost weight.       Physical Exam   General Appearance:   No acute distress    /70 (BP Location: Left arm, Patient Position: Sitting, Cuff Size: Adult Large)   Pulse 85   Temp 98.1  F (36.7  C) (Tympanic)   Resp 16   Ht 1.727 m (5' 8\")   Wt 126.3 kg (278 lb 8 oz)   SpO2 96%   BMI 42.35 kg/m           Additional Information   Current Outpatient Medications   Medication Sig Dispense Refill    " Ascorbic Acid (VITAMIN C PO) Take 1,000 mg by mouth daily      aspirin (ASA) 81 MG EC tablet Take 1 tablet (81 mg) by mouth daily      azelastine (ASTELIN) 0.1 % nasal spray 2 sprays each nostril 1-2x daily as needed for nasal congestion (use nightly for first 2 week) 30 mL 11    carvedilol (COREG) 25 MG tablet Take 1 tablet (25 mg) by mouth 2 times daily (with meals) 180 tablet 3    chlorthalidone (HYGROTON) 25 MG tablet Take 0.5 tablets (12.5 mg) by mouth daily 45 tablet 2    cholecalciferol, vitamin D3, 50 mcg (2,000 unit) Tab [CHOLECALCIFEROL, VITAMIN D3, 50 MCG (2,000 UNIT) TAB] Take 50 mcg by mouth.      doxazosin (CARDURA) 1 MG tablet Take 1 tablet (1 mg) by mouth daily 90 tablet 2    lisinopril (ZESTRIL) 5 MG tablet Take 1 tablet (5 mg) by mouth daily 90 tablet 4    rosuvastatin (CRESTOR) 5 MG tablet Take 1 tablet (5 mg) by mouth daily (Patient taking differently: Take 5 mg by mouth Taking 2 times a week) 90 tablet 1    Semaglutide, 1 MG/DOSE, (OZEMPIC) 4 MG/3ML pen Inject 1 mg Subcutaneous every 7 days 9 mL 1    Zinc 50 MG CAPS Take by mouth daily      budesonide (PULMICORT) 0.5 MG/2ML neb solution Take 2 mLs (0.5 mg) by nebulization daily for 90 days 180 mL 0       Time:      Waldo Kerr MD

## 2023-10-26 LAB
CREAT UR-MCNC: 118 MG/DL
MICROALBUMIN UR-MCNC: <12 MG/L
MICROALBUMIN/CREAT UR: NORMAL MG/G{CREAT}

## 2023-11-01 ENCOUNTER — TRANSFERRED RECORDS (OUTPATIENT)
Dept: HEALTH INFORMATION MANAGEMENT | Facility: CLINIC | Age: 63
End: 2023-11-01
Payer: MEDICARE

## 2023-11-01 LAB — RETINOPATHY: POSITIVE

## 2023-11-03 ENCOUNTER — ONCOLOGY VISIT (OUTPATIENT)
Dept: ONCOLOGY | Facility: HOSPITAL | Age: 63
End: 2023-11-03
Attending: INTERNAL MEDICINE
Payer: MEDICARE

## 2023-11-03 ENCOUNTER — INFUSION THERAPY VISIT (OUTPATIENT)
Dept: INFUSION THERAPY | Facility: HOSPITAL | Age: 63
End: 2023-11-03
Attending: INTERNAL MEDICINE
Payer: MEDICARE

## 2023-11-03 VITALS
HEIGHT: 68 IN | BODY MASS INDEX: 42.24 KG/M2 | DIASTOLIC BLOOD PRESSURE: 85 MMHG | TEMPERATURE: 97.4 F | WEIGHT: 278.7 LBS | SYSTOLIC BLOOD PRESSURE: 156 MMHG | RESPIRATION RATE: 20 BRPM | OXYGEN SATURATION: 95 % | HEART RATE: 70 BPM

## 2023-11-03 DIAGNOSIS — C77.5 PROSTATE CANCER METASTATIC TO INTRAPELVIC LYMPH NODE (H): Primary | ICD-10-CM

## 2023-11-03 DIAGNOSIS — N18.4 CKD (CHRONIC KIDNEY DISEASE) STAGE 4, GFR 15-29 ML/MIN (H): ICD-10-CM

## 2023-11-03 DIAGNOSIS — C61 PROSTATE CANCER METASTATIC TO INTRAPELVIC LYMPH NODE (H): Primary | ICD-10-CM

## 2023-11-03 DIAGNOSIS — E03.9 ACQUIRED HYPOTHYROIDISM: ICD-10-CM

## 2023-11-03 LAB
ANION GAP SERPL CALCULATED.3IONS-SCNC: 8 MMOL/L (ref 7–15)
BUN SERPL-MCNC: 29 MG/DL (ref 8–23)
CALCIUM SERPL-MCNC: 10.4 MG/DL (ref 8.8–10.2)
CHLORIDE SERPL-SCNC: 105 MMOL/L (ref 98–107)
CREAT SERPL-MCNC: 1.22 MG/DL (ref 0.67–1.17)
DEPRECATED HCO3 PLAS-SCNC: 26 MMOL/L (ref 22–29)
EGFRCR SERPLBLD CKD-EPI 2021: 66 ML/MIN/1.73M2
GLUCOSE SERPL-MCNC: 124 MG/DL (ref 70–99)
POTASSIUM SERPL-SCNC: 4.6 MMOL/L (ref 3.4–5.3)
PSA SERPL DL<=0.01 NG/ML-MCNC: 0.16 NG/ML (ref 0–4.5)
SODIUM SERPL-SCNC: 139 MMOL/L (ref 135–145)
TSH SERPL DL<=0.005 MIU/L-ACNC: 3.02 UIU/ML (ref 0.3–4.2)

## 2023-11-03 PROCEDURE — 99214 OFFICE O/P EST MOD 30 MIN: CPT | Performed by: NURSE PRACTITIONER

## 2023-11-03 PROCEDURE — G0463 HOSPITAL OUTPT CLINIC VISIT: HCPCS | Mod: 25 | Performed by: NURSE PRACTITIONER

## 2023-11-03 PROCEDURE — 99213 OFFICE O/P EST LOW 20 MIN: CPT | Mod: 25 | Performed by: NURSE PRACTITIONER

## 2023-11-03 PROCEDURE — 80048 BASIC METABOLIC PNL TOTAL CA: CPT

## 2023-11-03 PROCEDURE — 84443 ASSAY THYROID STIM HORMONE: CPT

## 2023-11-03 PROCEDURE — 36415 COLL VENOUS BLD VENIPUNCTURE: CPT

## 2023-11-03 PROCEDURE — 96402 CHEMO HORMON ANTINEOPL SQ/IM: CPT

## 2023-11-03 PROCEDURE — 250N000011 HC RX IP 250 OP 636: Mod: JZ | Performed by: NURSE PRACTITIONER

## 2023-11-03 PROCEDURE — 84153 ASSAY OF PSA TOTAL: CPT

## 2023-11-03 RX ADMIN — LEUPROLIDE ACETATE 45 MG: 45 INJECTION, SUSPENSION, EXTENDED RELEASE SUBCUTANEOUS at 11:55

## 2023-11-03 ASSESSMENT — PAIN SCALES - GENERAL: PAINLEVEL: NO PAIN (0)

## 2023-11-03 NOTE — PROGRESS NOTES
"[facility] Hematology and Oncology Progress Note    Patient: Manas Martinez  MRN: [id number]  Date of Service: Nov 3, 2023         Reason for Visit:    Scheduled f/up    Assessment and Plan:    1.  Metastatic prostate cancer - PSA recurrence.    63 year old.  Manas presents alone.  He was in good spirits and, overall, feeling good.  Appetite good - weight intentionally down ~10 pounds the past 6 months.  States his main activity is fishing - heading to [place] for the winter where they live on his 32 foot boat.  Stable, every 2-hour urination, s/p EBRT ... \"they fried me\".  Some mild constipation since started Ozempic ... manages with diet.  Denies cough, fever, chills, unusual headaches, visual or mentation disturbance, rash.    PSA - pending (prior 0.24).    Subjectively feeling well.  Requests changing to the every 6-month depot - 45 mg Eligard ordered.  6-month f/up next dose Eligard with labs per treatment plan.    Oncologic History:     1.  Metastatic prostate cancer:   06/03/2029 PSA 3.9.  2019, November biopsy of a retroperitoneal node - adenocarcinoma.    CT scan showed adenopathy infolding the aortic bifurcation and mild pelvic adenopathy along the internal iliac chains bilaterally.    Bone scan - negative.    PET scan showed avid bilateral external iliac, left common iliac, aortocaval, and left retroperitoneal lymphadenopathy.  The most cephalad note is a 15 x 12 mm aortocaval node immediately anterior to L3.  Prostate MRI showed cancer involving almost the entire gland with broad capsular blurring without obvious evidence of extracapsular extension.  Prostate biopsy - Rosa grade 5+4 = 9, the majority appearing to be 4+4 = 8.  12/11/2019 - initially given Firmagon.  07/14/2020 - completed 70.2 Gy / 26 fractions definitive IMRT to the prostate and pelvic and para-aortic lymph nodes at the [facility].    2020, January - December, 2021 completed 2-years Eligard q3 months and apalutamide.  " Apalutamide HELD in July, 2020 given elevated cholesterol and hypertension.  06/07/2023 PSA recurrence @ 2.83, doubling time of 2.6 months.   06/16/2023 PET scan from June 17 showed increased uptake in the prostate gland and extending into the seminal vesicles bilaterally.  Possible uptake in a left common iliac node as well.    [place] f/up recommended systemic therapy with androgen deprivation therapy alone.  07/14/2023 - Firmagon 240 mg.  08/11/2023 - Eliard 22.5 mg.  11/03/2023 switched to 6-month depot.    ECOG Performance:    1 - Can't do physically strenuous work, but fully ambyulatory and can do light sedentary work    Pain:    Pain Score: No Pain (0)    Encounter Diagnoses:    Problem List Items Addressed This Visit       Prostate cancer metastatic to intrapelvic lymph node (H) - Primary    Relevant Orders    Infusion Appointment Request    PSA tumor marker    Infusion Appointment Request (Completed)            32 minutes of time were spent on the date of this encounter with chart review, face to face time with the patient, examination, recommendations, documentation, and communication of the plan of care to the care team.     Jorje Stubbs, CNP     CC: Waldo Kerr MD   ______________________________________________________________________________    Review of Systems:    No fever or night sweats.  No concerning loss of weight.  No lumps or bumps anywhere.  No unusual headaches or eyesight issues.  No dizziness.  No bleeding from the nose.  No sores in the mouth. No problems with swallowing.  No chest pain. No shortness of breath. No cough.  No abdominal pain. No nausea or vomiting.  No diarrhea or constipation.  No blood in stool or black colored stools.  No problems passing urine.  No numbness or tingling in hands or feet.  No skin rashes.    A 14 point review of systems is otherwise negative.    Past History:    Past Medical History:   Diagnosis Date    Adenocarcinoma of prostate (H)     Allergic  "rhinitis     Cancer (H)     Coronary artery disease     Deviated septum     Elevated PSA     Hyperlipemia     Hypertension     Joint pain     Myocardial infarction (H) 1999    Non Q wave MI, RCA    Obesity     Other asplenic status     Prediabetes     Sleep apnea     Urinary frequency        Past Surgical History:   Procedure Laterality Date    ADRENALECTOMY Right     [place]    CORONARY STENT PLACEMENT  01/01/1999    x2 RCA,    CT BIOPSY RETROPERITONEAL MASS  12/04/2019    OTHER SURGICAL HISTORY      prostrate biopsy    REPAIR TENDON BICEPS DISTAL UPPER EXTREMITY Left     REPAIR TENDON FINGER(S) Left     WISDOM TOOTH EXTRACTION      ZZC REMOVAL SPLEEN, TOTAL  01/01/1972    Description: Splenectomy;  Recorded: 11/16/2011;, accident     Physical Exam:    BP (!) 156/85 (BP Location: Right arm, Patient Position: Sitting, Cuff Size: Adult Large)   Pulse 70   Temp 97.4  F (36.3  C) (Tympanic)   Resp 20   Ht 1.727 m (5' 8\")   Wt 126.4 kg (278 lb 11.2 oz)   SpO2 95%   BMI 42.38 kg/m      GENERAL:   Alert and oriented. Seated comfortably. In no distress.    HEENT:   Atraumatic and normocephalic.  OLVIN.  EOMI.  No pallor.  No icterus.  No mucosal lesions.    LYMPH NODES:  No palpable cervical, axillary or inguinal lymphadenopathy.    CHEST:   Lungs clear to auscultation bilaterally.    CVS:    S1 and S2 heard. Regular rate and rhythm.  No murmur or gallop or rub heard.  No peripheral edema.    ABDOMEN:   Soft. Not tender.  Overweight.  No palpable hepatomegaly or splenomegaly, masses or ascites    EXTREMITIES:  Warm.    SKIN:     No rash, or bruising or purpura noted.      Lab Results:    Reviewed with patient.    Recent Results (from the past 24 hour(s))   BASIC METABOLIC PANEL    Collection Time: 11/03/23 10:08 AM   Result Value Ref Range    Sodium 139 135 - 145 mmol/L    Potassium 4.6 3.4 - 5.3 mmol/L    Chloride 105 98 - 107 mmol/L    Carbon Dioxide (CO2) 26 22 - 29 mmol/L    " Anion Gap 8 7 - 15 mmol/L    Urea Nitrogen 29.0 (H) 8.0 - 23.0 mg/dL    Creatinine 1.22 (H) 0.67 - 1.17 mg/dL    GFR Estimate 66 >60 mL/min/1.73m2    Calcium 10.4 (H) 8.8 - 10.2 mg/dL    Glucose 124 (H) 70 - 99 mg/dL   TSH    Collection Time: 11/03/23 10:08 AM   Result Value Ref Range    TSH 3.02 0.30 - 4.20 uIU/mL     Imaging:    No results found.

## 2023-11-03 NOTE — PROGRESS NOTES
Manas came to clinic this afternoon for Eligard.  Patient seen by Jorje Masters CNP today and is in stable condition.   Ice applied to right upper outer buttock.  Eligard given to site subcutaneous.  He reports that this was much less painful than past injections.  He tolerated the injection well.  Manas left clinic ambulatory.

## 2023-11-03 NOTE — LETTER
"    11/3/2023         RE: Manas Martinez  [address]  [facility] [address]        Dear Colleague,    Thank you for referring your patient, Manas Martinez, to the [facility] [place]. Please see a copy of my visit note below.    [facility] Hematology and Oncology Progress Note    Patient: Manas Martinez  MRN: [id number]  Date of Service: Nov 3, 2023         Reason for Visit:    Scheduled f/up    Assessment and Plan:    1.  Metastatic prostate cancer - PSA recurrence.    63 year old.  Manas presents alone.  He was in good spirits and, overall, feeling good.  Appetite good - weight intentionally down ~10 pounds the past 6 months.  States his main activity is fishing - heading to [place] for the winter where they live on his 32 foot boat.  Stable, every 2-hour urination, s/p EBRT ... \"they fried me\".  Some mild constipation since started Ozempic ... manages with diet.  Denies cough, fever, chills, unusual headaches, visual or mentation disturbance, rash.    PSA - pending (prior 0.24).    Subjectively feeling well.  Requests changing to the every 6-month depot - 45 mg Eligard ordered.  6-month f/up next dose Eligard with labs per treatment plan.    Oncologic History:     1.  Metastatic prostate cancer:   06/03/2029 PSA 3.9.  2019, November biopsy of a retroperitoneal node - adenocarcinoma.    CT scan showed adenopathy infolding the aortic bifurcation and mild pelvic adenopathy along the internal iliac chains bilaterally.    Bone scan - negative.    PET scan showed avid bilateral external iliac, left common iliac, aortocaval, and left retroperitoneal lymphadenopathy.  The most cephalad note is a 15 x 12 mm aortocaval node immediately anterior to L3.  Prostate MRI showed cancer involving almost the entire gland with broad capsular blurring without obvious evidence of extracapsular extension.  Prostate biopsy - Rosa grade 5+4 = 9, the majority appearing to be 4+4 = 8.  12/11/2019 - initially " given Firmagon.  07/14/2020 - completed 70.2 Gy / 26 fractions definitive IMRT to the prostate and pelvic and para-aortic lymph nodes at the [facility].    2020, January - December, 2021 completed 2-years Eligard q3 months and apalutamide.  Apalutamide HELD in July, 2020 given elevated cholesterol and hypertension.  06/07/2023 PSA recurrence @ 2.83, doubling time of 2.6 months.   06/16/2023 PET scan from June 17 showed increased uptake in the prostate gland and extending into the seminal vesicles bilaterally.  Possible uptake in a left common iliac node as well.    [place] f/up recommended systemic therapy with androgen deprivation therapy alone.  07/14/2023 - Firmagon 240 mg.  08/11/2023 - Eliard 22.5 mg.  11/03/2023 switched to 6-month depot.    ECOG Performance:    1 - Can't do physically strenuous work, but fully ambyulatory and can do light sedentary work    Pain:    Pain Score: No Pain (0)    Encounter Diagnoses:    Problem List Items Addressed This Visit       Prostate cancer metastatic to intrapelvic lymph node (H) - Primary    Relevant Orders    Infusion Appointment Request    PSA tumor marker    Infusion Appointment Request (Completed)            32 minutes of time were spent on the date of this encounter with chart review, face to face time with the patient, examination, recommendations, documentation, and communication of the plan of care to the care team.     Jorje Stubbs, CNP     CC: Waldo Kerr MD   ______________________________________________________________________________    Review of Systems:    No fever or night sweats.  No concerning loss of weight.  No lumps or bumps anywhere.  No unusual headaches or eyesight issues.  No dizziness.  No bleeding from the nose.  No sores in the mouth. No problems with swallowing.  No chest pain. No shortness of breath. No cough.  No abdominal pain. No nausea or vomiting.  No diarrhea or constipation.  No blood in stool or black colored stools.  No problems  "passing urine.  No numbness or tingling in hands or feet.  No skin rashes.    A 14 point review of systems is otherwise negative.    Past History:    Past Medical History:   Diagnosis Date     Adenocarcinoma of prostate (H)      Allergic rhinitis      Cancer (H)      Coronary artery disease      Deviated septum      Elevated PSA      Hyperlipemia      Hypertension      Joint pain      Myocardial infarction (H) 1999    Non Q wave MI, RCA     Obesity      Other asplenic status      Prediabetes      Sleep apnea      Urinary frequency        Past Surgical History:   Procedure Laterality Date     ADRENALECTOMY Right      [place]     CORONARY STENT PLACEMENT  01/01/1999    x2 RCA,     CT BIOPSY RETROPERITONEAL MASS  12/04/2019     OTHER SURGICAL HISTORY      prostrate biopsy     REPAIR TENDON BICEPS DISTAL UPPER EXTREMITY Left      REPAIR TENDON FINGER(S) Left      WISDOM TOOTH EXTRACTION       ZZC REMOVAL SPLEEN, TOTAL  01/01/1972    Description: Splenectomy;  Recorded: 11/16/2011;, accident     Physical Exam:    BP (!) 156/85 (BP Location: Right arm, Patient Position: Sitting, Cuff Size: Adult Large)   Pulse 70   Temp 97.4  F (36.3  C) (Tympanic)   Resp 20   Ht 1.727 m (5' 8\")   Wt 126.4 kg (278 lb 11.2 oz)   SpO2 95%   BMI 42.38 kg/m      GENERAL:   Alert and oriented. Seated comfortably. In no distress.    HEENT:   Atraumatic and normocephalic.  OLVIN.  EOMI.  No pallor.  No icterus.  No mucosal lesions.    LYMPH NODES:  No palpable cervical, axillary or inguinal lymphadenopathy.    CHEST:   Lungs clear to auscultation bilaterally.    CVS:    S1 and S2 heard. Regular rate and rhythm.  No murmur or gallop or rub heard.  No peripheral edema.    ABDOMEN:   Soft. Not tender.  Overweight.  No palpable hepatomegaly or splenomegaly, masses or ascites    EXTREMITIES:  Warm.    SKIN:     No rash, or bruising or purpura noted.      Lab Results:    Reviewed with patient.    Recent Results " "(from the past 24 hour(s))   BASIC METABOLIC PANEL    Collection Time: 11/03/23 10:08 AM   Result Value Ref Range    Sodium 139 135 - 145 mmol/L    Potassium 4.6 3.4 - 5.3 mmol/L    Chloride 105 98 - 107 mmol/L    Carbon Dioxide (CO2) 26 22 - 29 mmol/L    Anion Gap 8 7 - 15 mmol/L    Urea Nitrogen 29.0 (H) 8.0 - 23.0 mg/dL    Creatinine 1.22 (H) 0.67 - 1.17 mg/dL    GFR Estimate 66 >60 mL/min/1.73m2    Calcium 10.4 (H) 8.8 - 10.2 mg/dL    Glucose 124 (H) 70 - 99 mg/dL   TSH    Collection Time: 11/03/23 10:08 AM   Result Value Ref Range    TSH 3.02 0.30 - 4.20 uIU/mL     Imaging:    No results found.        Oncology Rooming Note    November 3, 2023 10:58 AM   Manas Martinez is a 63 year old male who presents for:    Chief Complaint   Patient presents with     Oncology Clinic Visit     4 month follow up with labs/injection related to Prostate cancer metastatic to intrapelvic lymph node      Initial Vitals: BP (!) 156/85 (BP Location: Right arm, Patient Position: Sitting, Cuff Size: Adult Large)   Pulse 70   Temp 97.4  F (36.3  C) (Tympanic)   Resp 20   Ht 1.727 m (5' 8\")   Wt 126.4 kg (278 lb 11.2 oz)   SpO2 95%   BMI 42.38 kg/m   Estimated body mass index is 42.38 kg/m  as calculated from the following:    Height as of this encounter: 1.727 m (5' 8\").    Weight as of this encounter: 126.4 kg (278 lb 11.2 oz). Body surface area is 2.46 meters squared.  No Pain (0) Comment: Data Unavailable   No LMP for male patient.  Allergies reviewed: Yes  Medications reviewed: Yes    Medications: Medication refills not needed today.  Pharmacy name entered into [facility]:    [id number] - [place], [place] - [address]  [facility] [place], [place] - [address] [id number] - [place], [place] - [address] AT [facility] OF [address]    Clinical concerns: 4 month follow up with labs/injection related to Prostate " cancer metastatic to intrapelvic lymph node       JOE MIRAMONTES CMA                Again, thank you for allowing me to participate in the care of your patient.        Sincerely,        Jorje Stubbs, CNP

## 2023-11-03 NOTE — PROGRESS NOTES
"Oncology Rooming Note    November 3, 2023 10:58 AM   Manas Martinez is a 63 year old male who presents for:    Chief Complaint   Patient presents with    Oncology Clinic Visit     4 month follow up with labs/injection related to Prostate cancer metastatic to intrapelvic lymph node      Initial Vitals: BP (!) 156/85 (BP Location: Right arm, Patient Position: Sitting, Cuff Size: Adult Large)   Pulse 70   Temp 97.4  F (36.3  C) (Tympanic)   Resp 20   Ht 1.727 m (5' 8\")   Wt 126.4 kg (278 lb 11.2 oz)   SpO2 95%   BMI 42.38 kg/m   Estimated body mass index is 42.38 kg/m  as calculated from the following:    Height as of this encounter: 1.727 m (5' 8\").    Weight as of this encounter: 126.4 kg (278 lb 11.2 oz). Body surface area is 2.46 meters squared.  No Pain (0) Comment: Data Unavailable   No LMP for male patient.  Allergies reviewed: Yes  Medications reviewed: Yes    Medications: Medication refills not needed today.  Pharmacy name entered into [facility]:    [facility][id number] - [place], [place] - [address] AT [facility] [address]  [facility] [address] [facility][id number] - [place], [place] - [address] AT [facility] OF [address]    Clinical concerns: 4 month follow up with labs/injection related to Prostate cancer metastatic to intrapelvic lymph node       JOE MIRAMONTES CMA              "

## 2023-12-24 ENCOUNTER — HEALTH MAINTENANCE LETTER (OUTPATIENT)
Age: 63
End: 2023-12-24

## 2024-01-08 NOTE — PROGRESS NOTES
Preoperative Consultation   Manas Martinez   59 y.o.  male    Date of visit: 2019  Physician: Waldo Kerr MD    This is a preoperative consultation requested by interventional radiology in preparation for CT guided biopsy on 19 at [facility], fax [phone].       Assessment and Plan   Manas Martinez was seen in preoperative consultation in preparation for CT guided biopsy.  This is a low risk surgery and the patient has increased risk for major cardiac complications based on a history of myocardial infarction.  Please note he has quite elevated blood pressure requiring numerous medications to control this.  He probably has an aldosterone producing adrenal adenoma.  I have asked him to continue on his medications through the surgery.  He will be arriving at the surgery center prior to when he would normally take his medications and therefore he will hold these but bring them with him.  He will maintain his clonidine patch to the surgery.  Please note he does have obstructive sleep apnea.  He will stop aspirin and he Jamil has.    1. Preoperative examination    2. Prostate cancer (H)    3. Adrenal adenoma, right    4. 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 Coronary artery disease due to lipid rich plaque    6. Hypertension    7. KIERSTEN not on CPAP    8. Obesity (BMI 35.0-39.9) with comorbidity (H)         Patient Profile   Social History     Social History Narrative    Lives with his wife, Cary.  Retired , [employer].  No biological children.  Three step children.  Four step child  from leukemia.        Past Medical History   Patient Active Problem List   Diagnosis     Allergic Rhinitis     Deviated Nasal Septum (Acquired)     Diffuse Joint Pains (Arthralgias)     Coronary artery disease due to lipid rich plaque     Hypertension     Prediabetes     Asplenia after surgical procedure     KIERSTEN not on CPAP     Adrenal adenoma, right     Obesity (BMI 35.0-39.9)    Venipuncture Blood Specimen Collection  Venipuncture performed in right hand by Lisa Liu MA with good hemostasis. Patient tolerated the procedure well without complications.   10/24/23   Lisa Liu MA    with comorbidity (H)     Benign prostatic hyperplasia with urinary frequency     RBBB       Past Surgical History  He has a past surgical history that includes pr removal spleen, total (1972); Coronary stent placement (1999); Cardiac catheterization (1999); [place] tooth extraction; Finger tendon repair (Left); and Biceps tendon repair (Left).     History of Present Illness   This 59 y.o. old man comes in for preop evaluation.  He was found to have a cancer on biopsy.  He had a CT scan which shows retroperitoneal lymphadenopathy.. He had a bone scan which showed no evidence of other metastases.  He has underlying hypertension which is been challenging to control.  Evaluation of the adrenal nodule has shown elevated aldosterone level concerning for aldosterone secreting adenoma.  He has met with Dr. Trace Adams and surgery has been considered.  He does have obstructive sleep apnea.  He has a history of myocardial infarction 22 years ago.  Recent stress testing looked okay.    Recent antiplatelet use: yes He has stopped aspirin  Personal or family history of bleeding or clotting disorders: no  Steroid use in the past year: no  Personal or family history of difficulty with anesthesia: no  Current cardiopulmonary symptoms: no    Review of Systems: A comprehensive review of systems was negative except as noted.     Medications and Allergies   Current Outpatient Medications   Medication Sig Dispense Refill     aspirin 81 MG EC tablet Take 81 mg by mouth daily. Delayed release.       carvedilol (COREG) 12.5 MG tablet Take 1 tablet (12.5 mg total) by mouth 2 (two) times a day with meals. 180 tablet 3     chlorthalidone (HYGROTEN) 25 MG tablet Take 1 tablet (25 mg total) by mouth daily. 45 tablet 3     cloNIDine (CATAPRES-TTS) 0.3 mg/24 hr Place 1 patch on the skin every 7 days.       doxazosin (CARDURA) 4 MG tablet TK 2 TS PO HS  2     fluticasone (FLONASE) 50 mcg/actuation nasal spray 2 sprays into each nostril daily. 48 g  "3     lisinopril (PRINIVIL,ZESTRIL) 40 MG tablet Take 1 tablet (40 mg total) by mouth daily. 90 tablet 2     simvastatin (ZOCOR) 40 MG tablet TAKE 1 TABLET(40 MG) BY MOUTH AT BEDTIME 90 tablet 1     spironolactone (ALDACTONE) 25 MG tablet Take 1 tablet (25 mg total) by mouth daily. 45 tablet 3     No current facility-administered medications for this visit.      Allergies   Allergen Reactions     Atorvastatin Other (See Comments)     Leg Cramps        Family and Social History   Family History   Problem Relation Age of Onset     Hypertension Mother      Diabetes Mother      COPD Mother      Hypertension Father      Diabetes Father      Prostate cancer Father      Pancreatic cancer Father      Hypertension Brother      Prostate cancer Brother      Diabetes Paternal Grandmother      Diabetes Paternal Grandfather      Hypertension Brother      Hypertension Brother         Social History     Tobacco Use     Smoking status: Former Smoker     Packs/day: 1.00     Years: 25.00     Pack years: 25.00     Types: Cigarettes     Last attempt to quit:      Years since quittin.9     Smokeless tobacco: Never Used   Substance Use Topics     Alcohol use: Yes     Alcohol/week: 10.0 standard drinks     Types: 10 Cans of beer per week     Drug use: No        Physical Exam   General Appearance:   No acute distress    BP (!) 144/100 (Patient Site: Left Arm, Patient Position: Sitting, Cuff Size: Adult Regular)   Pulse (!) 56   Ht 5' 8.5\" (1.74 m)   Wt (!) 255 lb (115.7 kg)   SpO2 98%   BMI 38.21 kg/m      EYES: Eyelids, conjunctiva, and sclera were normal. Pupils were normal. Cornea, iris, and lens were normal bilaterally.  HEAD, EARS, NOSE, MOUTH, AND THROAT: Head and face were normal. Hearing was normal to voice and the ears were normal to external exam. Nose appearance was normal and there was no discharge. Oropharynx was normal.  NECK: Neck appearance was normal. There were no neck masses and the thyroid was not " enlarged.  RESPIRATORY: Breathing pattern was normal and the chest moved symmetrically.  Percussion/auscultatory percussion was normal.  Lung sounds were normal and there were no abnormal sounds.  CARDIOVASCULAR: Heart rate and rhythm were normal.  S1 and S2 were normal and there were no extra sounds or murmurs. Peripheral pulses in arms and legs were normal.  Jugular venous pressure was normal.  There was no peripheral edema.  GASTROINTESTINAL: The abdomen was normal in contour.  Bowel sounds were present.  Percussion detected no organ enlargement or tenderness.  Palpation detected no tenderness, mass, or enlarged organs.   MUSCULOSKELETAL: Skeletal configuration was normal and muscle mass was normal for age. Joint appearance was overall normal.  LYMPHATIC: There were no enlarged nodes.  SKIN/HAIR/NAILS: Skin color was normal.  There were no skin lesions.  Hair and nails were normal.  NEUROLOGIC: The patient was alert and oriented to person, place, time, and circumstance. Speech was normal. Cranial nerves were normal. Motor strength was normal for age. The patient was normally coordinated.  PSYCHIATRIC:  Mood and affect were normal and the patient had normal recent and remote memory. The patient's judgment and insight were normal.       Additional Tests   Laboratory: Potassium and creatinine pending    Radiology: Reviewed    Electrocardiogram: Sinus bradycardia with left axis deviation right bundle branch block, personally reviewed    Total time spent with the patient today was 40 minutes of which > 50% was spent in counseling and coordination of care     Waldo Kerr MD  Internal Medicine  Contact me at [phone]   Yes

## 2024-01-22 ENCOUNTER — MYC MEDICAL ADVICE (OUTPATIENT)
Dept: INTERNAL MEDICINE | Facility: CLINIC | Age: 64
End: 2024-01-22
Payer: MEDICARE

## 2024-01-22 DIAGNOSIS — E66.01 SEVERE OBESITY (H): ICD-10-CM

## 2024-01-22 DIAGNOSIS — R80.9 TYPE 2 DIABETES MELLITUS WITH MICROALBUMINURIA, WITHOUT LONG-TERM CURRENT USE OF INSULIN (H): Primary | ICD-10-CM

## 2024-01-22 DIAGNOSIS — E11.29 TYPE 2 DIABETES MELLITUS WITH MICROALBUMINURIA, WITHOUT LONG-TERM CURRENT USE OF INSULIN (H): Primary | ICD-10-CM

## 2024-01-23 ENCOUNTER — TELEPHONE (OUTPATIENT)
Dept: INTERNAL MEDICINE | Facility: CLINIC | Age: 64
End: 2024-01-23
Payer: MEDICARE

## 2024-01-23 NOTE — TELEPHONE ENCOUNTER
Please initiate PA for Ozempic, please referred to [employer] message.     Please notify MD of approval or denial once completed, thank you.

## 2024-01-25 ENCOUNTER — TRANSFERRED RECORDS (OUTPATIENT)
Dept: HEALTH INFORMATION MANAGEMENT | Facility: CLINIC | Age: 64
End: 2024-01-25
Payer: MEDICARE

## 2024-01-25 NOTE — TELEPHONE ENCOUNTER
PA Initiation    Medication: OZEMPIC (1 MG/DOSE) 4 MG/3ML SC SOPN  Insurance Company: [employer] - Phone [phone] Fax [phone]  Pharmacy Filling the Rx: [facility][id number] [place], [place] - [address] AT [facility] OF [address]  Filling Pharmacy Phone: [phone]  Filling Pharmacy Fax: [phone]  Start Date: 1/25/2024

## 2024-01-26 NOTE — TELEPHONE ENCOUNTER
PRIOR AUTHORIZATION DENIED    Medication: OZEMPIC (1 MG/DOSE) 4 MG/3ML SC SOPN    Insurance Company: [employer] - Phone [phone] Fax [phone]    Denial Date: 1/25/2024    Denial Reason(s): Patient is not using to treat type 2 diabetes.     Appeal Information:

## 2024-01-26 NOTE — CONFIDENTIAL NOTE
Unfortunately, a formal diagnosis of diabetes requires an A1c of 6.5 or higher.  Therefore he does not have diabetes    He has prediabetes

## 2024-02-02 PROBLEM — E11.29 TYPE 2 DIABETES MELLITUS WITH MICROALBUMINURIA, WITHOUT LONG-TERM CURRENT USE OF INSULIN (H): Status: ACTIVE | Noted: 2024-02-02

## 2024-02-02 PROBLEM — R80.9 TYPE 2 DIABETES MELLITUS WITH MICROALBUMINURIA, WITHOUT LONG-TERM CURRENT USE OF INSULIN (H): Status: ACTIVE | Noted: 2024-02-02

## 2024-02-02 NOTE — TELEPHONE ENCOUNTER
Thank you, I updated the problem list and sent the medication to [facility] in [facility]  
Trina Kerr,     patient does not have formal diabetes diagnosis on his profile and the last ozempic order was sent under Prediabetes and Obesity. If you can put that diagnosis, and send a new order to the pharmacy would be very much helpful for me to do and appeal for Ozempic.     Thanks,  Chuck 
Welcome back Dr. Kerr!    Sent the appeal as high priority, and hopefully he gets approved.   Thanks,  Chuck
While his A1c is not greater than 6.5%, he has had 2 fasting blood sugars over 126 consistent with the diagnosis of diabetes.  Would you be able to help with any paperwork needed to get him an approved GLP-1 agonist?  Thank you!  
Felix Álvarez(Attending)

## 2024-02-02 NOTE — TELEPHONE ENCOUNTER
Medication Appeal Initiation    Medication: OZEMPIC (1 MG/DOSE) 4 MG/3ML SC SOPN  Appeal Start Date:  2/2/2024  Insurance Company: [employer] - Phone [phone] Fax [phone]   Insurance Phone: [phone]   Insurance Fax: [phone]   Comments:

## 2024-02-05 NOTE — TELEPHONE ENCOUNTER
MEDICATION APPEAL APPROVED    Medication: OZEMPIC (1 MG/DOSE) 4 MG/3ML SC SOPN  Authorization Effective Date: 1/1/2024  Authorization Expiration Date: 2/4/2025  Approved Dose/Quantity:   Reference #:     Appeal Insurance Company: [employer] - Phone [phone] Fax [phone]    Expected CoPay: $       CoPay Card Available:    Financial Assistance Needed:   Filling Pharmacy: [facility] #[id number] [place] [address] AT [facility] OF [address]  Patient Notified: **Instructed pharmacy to notify patient when script is ready to /ship.**  Comments:

## 2024-02-06 NOTE — TELEPHONE ENCOUNTER
Trina Kerr,    Just updated Manas on this medication. He should be okay with the timing. He asked about his uric acid been high and asked about his medications. I told him the chlorthalidone could increase uric acid, but he has been stable on this medication. Told him Dr. Kerr has a plan for him with allopurinol. He asked about this medication and kidney function. To be safe, we can do initial dose of 50 mg and slowly go up. Said his recent eGFR was less than 60 at another healthcare?    Thanks,  Chuck

## 2024-02-07 ENCOUNTER — VIRTUAL VISIT (OUTPATIENT)
Dept: INTERNAL MEDICINE | Facility: CLINIC | Age: 64
End: 2024-02-07
Payer: MEDICARE

## 2024-02-07 DIAGNOSIS — M10.072 ACUTE IDIOPATHIC GOUT INVOLVING TOE OF LEFT FOOT: Primary | ICD-10-CM

## 2024-02-07 PROCEDURE — 99213 OFFICE O/P EST LOW 20 MIN: CPT | Mod: 95 | Performed by: INTERNAL MEDICINE

## 2024-02-07 RX ORDER — ALLOPURINOL 100 MG/1
50 TABLET ORAL DAILY
Qty: 45 TABLET | Refills: 1 | Status: SHIPPED | OUTPATIENT
Start: 2024-02-07 | End: 2024-07-23

## 2024-02-07 RX ORDER — PREDNISONE 10 MG/1
TABLET ORAL
Qty: 33 TABLET | Refills: 0 | Status: SHIPPED | OUTPATIENT
Start: 2024-02-07 | End: 2024-02-27

## 2024-02-07 NOTE — PROGRESS NOTES
Manas is a 64 year old who is being evaluated via a billable video visit.      How would you like to obtain your AVS? MyChart  If the video visit is dropped, the invitation should be resent by: Text to cell phone: [phone]  Will anyone else be joining your video visit?           1. Acute idiopathic gout involving toe of left foot  This 64-year-old man has had about a month of gout symptoms in his left toe.  He was initially given indomethacin and methylprednisolone which helped but his symptoms have returned.  He has modified his diet.  His uric acid level is elevated.  I would like him to start allopurinol we discussed risk of Dexter-Mirza syndrome and renal dosing.  I would like him to avoid indomethacin or other NSAIDs given his chronic kidney disease and we will do a slow taper of prednisone while starting allopurinol  - allopurinol (ZYLOPRIM) 100 MG tablet; Take 0.5 tablets (50 mg) by mouth daily  Dispense: 45 tablet; Refill: 1  - predniSONE (DELTASONE) 10 MG tablet; Take 3 tablets (30 mg) by mouth daily for 5 days, THEN 2 tablets (20 mg) daily for 5 days, THEN 1 tablet (10 mg) daily for 5 days, THEN 0.5 tablets (5 mg) daily for 5 days.  Dispense: 33 tablet; Refill: 0      Subjective   Manas is a 64 year old, presenting for the following health issues:  Lab Result Notice (The patient would like to discuss lab results and how to proceed with treatment.)      2/7/2024     9:59 AM   Additional Questions   Roomed by Juanita PRICE CMA     History of Present Illness       Reason for visit:  High uric acid  Symptom onset:  3-4 weeks ago  Symptoms include:  Gout in left large toe  Symptom intensity:  Moderate  Symptom progression:  Staying the same  Had these symptoms before:  No    He eats 2-3 servings of fruits and vegetables daily.He consumes 0 sweetened beverage(s) daily.He exercises with enough effort to increase his heart rate 9 or less minutes per day.  He exercises with enough effort to increase his heart  rate 3 or less days per week.   He is taking medications regularly.             Objective           Vitals:  No vitals were obtained today due to virtual visit.    Physical Exam             Video-Visit Details    Type of service:  Video Visit   Originating Location (pt. Location): Home    Distant Location (provider location):  On-site  Platform used for Video Visit: Nathaniel  Signed Electronically by: Waldo Kerr MD

## 2024-04-15 ENCOUNTER — PATIENT OUTREACH (OUTPATIENT)
Dept: CARE COORDINATION | Facility: CLINIC | Age: 64
End: 2024-04-15
Payer: MEDICARE

## 2024-04-17 ENCOUNTER — TELEPHONE (OUTPATIENT)
Dept: INTERNAL MEDICINE | Facility: CLINIC | Age: 64
End: 2024-04-17

## 2024-04-17 NOTE — TELEPHONE ENCOUNTER
Reason for Call:  Appointment Request    Patient requesting this type of appt:  Preventive     Requested provider: Waldo Kerr    Reason patient unable to be scheduled: Not within requested timeframe    When does patient want to be seen/preferred time:  Anytime After 05/15/2024    Comments: Wants Early Morning    Could we send this information to you in [employer] or would you prefer to receive a phone call?:   Patient would prefer a phone call   Okay to leave a detailed message?: Yes at Cell number on file:    Telephone Information:   Mobile [phone]       Call taken on 4/17/2024 at 8:56 AM by Dusty Gaitan

## 2024-05-07 ENCOUNTER — ONCOLOGY VISIT (OUTPATIENT)
Dept: ONCOLOGY | Facility: HOSPITAL | Age: 64
End: 2024-05-07
Attending: INTERNAL MEDICINE
Payer: MEDICARE

## 2024-05-07 ENCOUNTER — INFUSION THERAPY VISIT (OUTPATIENT)
Dept: INFUSION THERAPY | Facility: HOSPITAL | Age: 64
End: 2024-05-07
Attending: INTERNAL MEDICINE
Payer: MEDICARE

## 2024-05-07 VITALS
WEIGHT: 272.1 LBS | HEIGHT: 68 IN | OXYGEN SATURATION: 95 % | SYSTOLIC BLOOD PRESSURE: 130 MMHG | RESPIRATION RATE: 16 BRPM | BODY MASS INDEX: 41.24 KG/M2 | TEMPERATURE: 97.8 F | HEART RATE: 75 BPM | DIASTOLIC BLOOD PRESSURE: 79 MMHG

## 2024-05-07 DIAGNOSIS — C77.5 PROSTATE CANCER METASTATIC TO INTRAPELVIC LYMPH NODE (H): Primary | ICD-10-CM

## 2024-05-07 DIAGNOSIS — C61 PROSTATE CANCER METASTATIC TO INTRAPELVIC LYMPH NODE (H): ICD-10-CM

## 2024-05-07 DIAGNOSIS — C61 PROSTATE CANCER METASTATIC TO INTRAPELVIC LYMPH NODE (H): Primary | ICD-10-CM

## 2024-05-07 DIAGNOSIS — C77.5 PROSTATE CANCER METASTATIC TO INTRAPELVIC LYMPH NODE (H): ICD-10-CM

## 2024-05-07 PROCEDURE — 99213 OFFICE O/P EST LOW 20 MIN: CPT | Performed by: INTERNAL MEDICINE

## 2024-05-07 PROCEDURE — 99214 OFFICE O/P EST MOD 30 MIN: CPT | Mod: 25 | Performed by: INTERNAL MEDICINE

## 2024-05-07 PROCEDURE — G0463 HOSPITAL OUTPT CLINIC VISIT: HCPCS | Mod: 25 | Performed by: INTERNAL MEDICINE

## 2024-05-07 PROCEDURE — 96402 CHEMO HORMON ANTINEOPL SQ/IM: CPT

## 2024-05-07 PROCEDURE — 36415 COLL VENOUS BLD VENIPUNCTURE: CPT

## 2024-05-07 PROCEDURE — 84153 ASSAY OF PSA TOTAL: CPT

## 2024-05-07 PROCEDURE — 250N000011 HC RX IP 250 OP 636: Mod: JZ | Performed by: INTERNAL MEDICINE

## 2024-05-07 RX ADMIN — LEUPROLIDE ACETATE 45 MG: 45 INJECTION, SUSPENSION, EXTENDED RELEASE SUBCUTANEOUS at 15:28

## 2024-05-07 ASSESSMENT — PAIN SCALES - GENERAL: PAINLEVEL: NO PAIN (0)

## 2024-05-07 NOTE — PROGRESS NOTES
"Oncology Rooming Note    May 7, 2024 2:32 PM   Manas Martinez is a 64 year old male who presents for:    Chief Complaint   Patient presents with    Oncology Clinic Visit     6 month return visit with labs and injection related to Prostate cancer metastatic to intrapelvic lymph node.      Initial Vitals: /79 (BP Location: Left arm, Patient Position: Sitting, Cuff Size: Adult Large)   Pulse 75   Temp 97.8  F (36.6  C) (Tympanic)   Resp 16   Ht 1.727 m (5' 8\")   Wt 123.4 kg (272 lb 1.6 oz)   SpO2 95%   BMI 41.37 kg/m   Estimated body mass index is 41.37 kg/m  as calculated from the following:    Height as of this encounter: 1.727 m (5' 8\").    Weight as of this encounter: 123.4 kg (272 lb 1.6 oz). Body surface area is 2.43 meters squared.  No Pain (0) Comment: Data Unavailable   No LMP for male patient.  Allergies reviewed: Yes  Medications reviewed: Yes    Medications: Medication refills not needed today.  Pharmacy name entered into [employer]:    [facility] #[id number] - [place], [place] - [address] AT [facility] [address]  [facility] [place] [id number] [facility] #[id number] [place], [place] - [address] AT [facility] OF [place] [address]    Frailty Screening:   Is the patient here for a new oncology consult visit in cancer care? 2. No      Clinical concerns: Discuss high uric acid and if shot affect the numbers.  Also discuss allopurinol and if any effects on the cancer.   Dr. Cordon was notified.      Juliana Melendez, MOSES            "

## 2024-05-07 NOTE — PROGRESS NOTES
[facility] Hematology and Oncology Progress Note    Patient: Manas Martinez  MRN: [id number]  Date of Service: May 7, 2024        Assessment and Plan:    1.  Prostate cancer: We await his PSA from today.  He will get a 6-month depot of Eligard today.  PSA in January was undetectable.  We reviewed the long-term plan which would be to add back abiraterone or enzalutamide when he has evidence of progression.  He is tolerating his Eligard well.  Has some fatigue and hot flashes.  No night sweats.  Will have him return to clinic in 6 months with Eligard and labs.    ECOG Performance  0    Diagnosis:    1.  Metastatic prostate cancer: Biopsy of a retroperitoneal node in December 2019 showed adenocarcinoma.  CT scan showed adenopathy infolding the aortic bifurcation and mild pelvic .  adenopathy along the internal iliac chains bilaterally. Bone scan at diagnosis was negative.  PET scan showed avid bilateral external iliac, left common iliac, aortocaval, and left retroperitoneal lymphadenopathy.  The most cephalad note is a 15 x 12 mm aortocaval node immediately anterior to L3.  Prostate MRI shows cancer involving almost the entire gland with broad capsular blurring without obvious evidence of extracapsular extension.  Prostate biopsy shows [place] grade 5+4 = 9, the majority appearing to be 4+4 = 8.  PSA at diagnosis was around 4.0.    PSA recurrence mid 2023.  2.83 on June 7, 2023.  0.27 in August 2022.  PET scan in June 2023 showed Increased PSMA uptake in the prostate extending into the seminal vesicles bilaterally   more prominent on the left is worrisome for tumor recurrence. Indeterminate mild PSMA uptake in a small left common iliac node possibly representing yvon metastasis.    Treatment:    Initially given Firmagon on December 11, 2019.     He received radiation to the prostate and pelvic nodes at the [facility].  Definitive radiotherapy to 70.2 Gy in 26 fractions of IMRT to the prostate, as well as  treatment of the pelvic and para-aortic lymph nodes. Treatment dates were June 8 through July 14, 2020.     Eligard q3 months and apalutamide started in January 2020.  We held the apalutamide in July, 2020 given elevated cholesterol and hypertension.  2 years of Eligard completed in 12/2021.    Recurrence:  Systemic therapy recommended by his providers at [facility].  He received a dose of Firmagon July 14, 2023.  Eligard started August 11, 2023.  He did not want any antiandrogen therapy.    Interim History:    Manas returns today for a follow up visit.  He was last seen about 6 months ago.  In the interim he has been doing well.  No new complaints.  Has some vasomotor symptoms but they are mild.  He has been put on allopurinol for gout.  He is not having any problems with his mood.  No significant fatigue.    Review of Systems:    As above in the history.     Review of Systems otherwise Negative for:  General: chills, fever or night sweats  Psychological: anxiety or depression  Ophthalmic: blurry vision, double vision or loss of vision, vision change  ENT: epistaxis, oral lesions, hearing changes  Hematological and Lymphatic: bleeding, bruising, jaundice, swollen lymph nodes  Endocrine: hot flashes, unexpected weight changes  Respiratory: cough, hemoptysis, orthopnea or shortness of breath/CROCKER  Cardiovascular: chest pain, edema, palpitations or PND  Gastrointestinal: abdominal pain, blood in stools, change in bowel habits, constipation, diarrhea or nausea/vomiting  Genito-Urinary: change in urinary stream, incontinence, frequency/urgency  Musculoskeletal: joint pain, stiffness, swelling, muscle pain  Neurological: dizziness, headaches, numbness/tingling  Dermatological: lumps and rash    Past History:    Past Medical History:   Diagnosis Date    Adenocarcinoma of prostate (H)     Allergic rhinitis     Cancer (H)     Coronary artery disease     Deviated septum     Elevated PSA     Hyperlipemia     Hypertension      "Joint pain     Myocardial infarction (H) 1999    Non Q wave MI, RCA    Obesity     Other asplenic status     Prediabetes     Sleep apnea     Urinary frequency      Physical Exam:    /79 (BP Location: Left arm, Patient Position: Sitting, Cuff Size: Adult Large)   Pulse 75   Temp 97.8  F (36.6  C) (Tympanic)   Resp 16   Ht 1.727 m (5' 8\")   Wt 123.4 kg (272 lb 1.6 oz)   SpO2 95%   BMI 41.37 kg/m      General: patient appears stated age of 63 year old. Nontoxic and in no distress.   HEENT: Head: atraumatic, normocephalic. Sclerae anicteric.  Chest:  Normal respiratory effort  Cardiac:  No edema.   Abdomen: abdomen is soft, non-distended  Extremities: normal tone and muscle bulk.  Skin: no lesions or rash on visible skin. Warm and dry.   CNS: alert and oriented. Grossly non-focal.   Psychiatric: normal mood and affect.     Lab Results:    No results found for this or any previous visit (from the past 168 hour(s)).    Imaging:    No results found.      Signed by: Miguel Cordon MD    "

## 2024-05-07 NOTE — PROGRESS NOTES
Manas came to clinic this afternoon for Eligard.  Patient seen by Dr. Cordon today and is in stable condition.   Ice applied to right upper outer buttock.  Eligard given to site subcutaneous.  He reports that this was much less painful than past injections.  He tolerated the injection well.  Manas left clinic ambulatory.

## 2024-05-07 NOTE — LETTER
"    5/7/2024         RE: Manas Martinez  [address]  [facility] [address]        Dear Colleague,    Thank you for referring your patient, Manas Martinez, to the [facility]. Please see a copy of my visit note below.    Oncology Rooming Note    May 7, 2024 2:32 PM   Manas Martinez is a 64 year old male who presents for:    Chief Complaint   Patient presents with     Oncology Clinic Visit     6 month return visit with labs and injection related to Prostate cancer metastatic to intrapelvic lymph node.      Initial Vitals: /79 (BP Location: Left arm, Patient Position: Sitting, Cuff Size: Adult Large)   Pulse 75   Temp 97.8  F (36.6  C) (Tympanic)   Resp 16   Ht 1.727 m (5' 8\")   Wt 123.4 kg (272 lb 1.6 oz)   SpO2 95%   BMI 41.37 kg/m   Estimated body mass index is 41.37 kg/m  as calculated from the following:    Height as of this encounter: 1.727 m (5' 8\").    Weight as of this encounter: 123.4 kg (272 lb 1.6 oz). Body surface area is 2.43 meters squared.  No Pain (0) Comment: Data Unavailable   No LMP for male patient.  Allergies reviewed: Yes  Medications reviewed: Yes    Medications: Medication refills not needed today.  Pharmacy name entered into [facility]:    [facility] #[id number] - [place], [place] - [address] AT [facility] [address]  [facility] [address] [facility] #[id number] [place], [place] - [address] AT [facility] OF [address]    Frailty Screening:   Is the patient here for a new oncology consult visit in cancer care? 2. No      Clinical concerns: Discuss high uric acid and if shot affect the numbers.  Also discuss allopurinol and if any effects on the cancer.   Dr. Cordon was notified.      Juliana Melendez [facility] Hematology and Oncology Progress Note    Patient: Manas Martinez  MRN: [id number]  Date of Service: May 7, 2024        Assessment " and Plan:    1.  Prostate cancer: We await his PSA from today.  He will get a 6-month depot of Eligard today.  PSA in January was undetectable.  We reviewed the long-term plan which would be to add back abiraterone or enzalutamide when he has evidence of progression.  He is tolerating his Eligard well.  Has some fatigue and hot flashes.  No night sweats.  Will have him return to clinic in 6 months with Eligard and labs.    ECOG Performance  0    Diagnosis:    1.  Metastatic prostate cancer: Biopsy of a retroperitoneal node in December 2019 showed adenocarcinoma.  CT scan showed adenopathy infolding the aortic bifurcation and mild pelvic .  adenopathy along the internal iliac chains bilaterally. Bone scan at diagnosis was negative.  PET scan showed avid bilateral external iliac, left common iliac, aortocaval, and left retroperitoneal lymphadenopathy.  The most cephalad note is a 15 x 12 mm aortocaval node immediately anterior to L3.  Prostate MRI shows cancer involving almost the entire gland with broad capsular blurring without obvious evidence of extracapsular extension.  Prostate biopsy shows [place] grade 5+4 = 9, the majority appearing to be 4+4 = 8.  PSA at diagnosis was around 4.0.    PSA recurrence mid 2023.  2.83 on June 7, 2023.  0.27 in August 2022.  PET scan in June 2023 showed Increased PSMA uptake in the prostate extending into the seminal vesicles bilaterally   more prominent on the left is worrisome for tumor recurrence. Indeterminate mild PSMA uptake in a small left common iliac node possibly representing yvon metastasis.    Treatment:    Initially given Firmagon on December 11, 2019.     He received radiation to the prostate and pelvic nodes at the [facility].  Definitive radiotherapy to 70.2 Gy in 26 fractions of IMRT to the prostate, as well as treatment of the pelvic and para-aortic lymph nodes. Treatment dates were June 8 through July 14, 2020.     Eligard q3 months and apalutamide started in  January 2020.  We held the apalutamide in July, 2020 given elevated cholesterol and hypertension.  2 years of Eligard completed in 12/2021.    Recurrence:  Systemic therapy recommended by his providers at [facility].  He received a dose of Firmagon July 14, 2023.  Eligard started August 11, 2023.  He did not want any antiandrogen therapy.    Interim History:    Manas returns today for a follow up visit.  He was last seen about 6 months ago.  In the interim he has been doing well.  No new complaints.  Has some vasomotor symptoms but they are mild.  He has been put on allopurinol for gout.  He is not having any problems with his mood.  No significant fatigue.    Review of Systems:    As above in the history.     Review of Systems otherwise Negative for:  General: chills, fever or night sweats  Psychological: anxiety or depression  Ophthalmic: blurry vision, double vision or loss of vision, vision change  ENT: epistaxis, oral lesions, hearing changes  Hematological and Lymphatic: bleeding, bruising, jaundice, swollen lymph nodes  Endocrine: hot flashes, unexpected weight changes  Respiratory: cough, hemoptysis, orthopnea or shortness of breath/CROCKER  Cardiovascular: chest pain, edema, palpitations or PND  Gastrointestinal: abdominal pain, blood in stools, change in bowel habits, constipation, diarrhea or nausea/vomiting  Genito-Urinary: change in urinary stream, incontinence, frequency/urgency  Musculoskeletal: joint pain, stiffness, swelling, muscle pain  Neurological: dizziness, headaches, numbness/tingling  Dermatological: lumps and rash    Past History:    Past Medical History:   Diagnosis Date     Adenocarcinoma of prostate (H)      Allergic rhinitis      Cancer (H)      Coronary artery disease      Deviated septum      Elevated PSA      Hyperlipemia      Hypertension      Joint pain      Myocardial infarction (H) 1999    Non Q wave MI, RCA     Obesity      Other asplenic status      Prediabetes      Sleep apnea   "    Urinary frequency      Physical Exam:    /79 (BP Location: Left arm, Patient Position: Sitting, Cuff Size: Adult Large)   Pulse 75   Temp 97.8  F (36.6  C) (Tympanic)   Resp 16   Ht 1.727 m (5' 8\")   Wt 123.4 kg (272 lb 1.6 oz)   SpO2 95%   BMI 41.37 kg/m      General: patient appears stated age of 63 year old. Nontoxic and in no distress.   HEENT: Head: atraumatic, normocephalic. Sclerae anicteric.  Chest:  Normal respiratory effort  Cardiac:  No edema.   Abdomen: abdomen is soft, non-distended  Extremities: normal tone and muscle bulk.  Skin: no lesions or rash on visible skin. Warm and dry.   CNS: alert and oriented. Grossly non-focal.   Psychiatric: normal mood and affect.     Lab Results:    No results found for this or any previous visit (from the past 168 hour(s)).    Imaging:    No results found.      Signed by: Miguel Cordon MD      Again, thank you for allowing me to participate in the care of your patient.        Sincerely,        Miguel Cordon MD  "

## 2024-05-08 LAB — PSA SERPL DL<=0.01 NG/ML-MCNC: 0.24 NG/ML (ref 0–4.5)

## 2024-05-12 ENCOUNTER — HEALTH MAINTENANCE LETTER (OUTPATIENT)
Age: 64
End: 2024-05-12

## 2024-05-17 ENCOUNTER — OFFICE VISIT (OUTPATIENT)
Dept: INTERNAL MEDICINE | Facility: CLINIC | Age: 64
End: 2024-05-17
Payer: MEDICARE

## 2024-05-17 VITALS
TEMPERATURE: 96.9 F | HEART RATE: 83 BPM | BODY MASS INDEX: 40.77 KG/M2 | DIASTOLIC BLOOD PRESSURE: 80 MMHG | HEIGHT: 68 IN | RESPIRATION RATE: 16 BRPM | SYSTOLIC BLOOD PRESSURE: 112 MMHG | OXYGEN SATURATION: 97 % | WEIGHT: 269 LBS

## 2024-05-17 DIAGNOSIS — I25.10 CORONARY ARTERY DISEASE INVOLVING NATIVE CORONARY ARTERY OF NATIVE HEART WITHOUT ANGINA PECTORIS: ICD-10-CM

## 2024-05-17 DIAGNOSIS — C77.5 PROSTATE CANCER METASTATIC TO INTRAPELVIC LYMPH NODE (H): ICD-10-CM

## 2024-05-17 DIAGNOSIS — N18.4 CKD (CHRONIC KIDNEY DISEASE) STAGE 4, GFR 15-29 ML/MIN (H): ICD-10-CM

## 2024-05-17 DIAGNOSIS — M1A.30X0 CHRONIC GOUT DUE TO RENAL IMPAIRMENT WITHOUT TOPHUS, UNSPECIFIED SITE: ICD-10-CM

## 2024-05-17 DIAGNOSIS — E11.29 TYPE 2 DIABETES MELLITUS WITH MICROALBUMINURIA, WITHOUT LONG-TERM CURRENT USE OF INSULIN (H): ICD-10-CM

## 2024-05-17 DIAGNOSIS — N40.1 BENIGN PROSTATIC HYPERPLASIA WITH URINARY FREQUENCY: ICD-10-CM

## 2024-05-17 DIAGNOSIS — I10 ESSENTIAL HYPERTENSION: ICD-10-CM

## 2024-05-17 DIAGNOSIS — Z90.81 ASPLENIA AFTER SURGICAL PROCEDURE: ICD-10-CM

## 2024-05-17 DIAGNOSIS — G47.33 OSA (OBSTRUCTIVE SLEEP APNEA): ICD-10-CM

## 2024-05-17 DIAGNOSIS — E78.2 MIXED HYPERLIPIDEMIA: ICD-10-CM

## 2024-05-17 DIAGNOSIS — D35.01 ADRENAL ADENOMA, RIGHT: ICD-10-CM

## 2024-05-17 DIAGNOSIS — C61 PROSTATE CANCER METASTATIC TO INTRAPELVIC LYMPH NODE (H): ICD-10-CM

## 2024-05-17 DIAGNOSIS — Z00.00 ANNUAL PHYSICAL EXAM: Primary | ICD-10-CM

## 2024-05-17 DIAGNOSIS — R80.9 TYPE 2 DIABETES MELLITUS WITH MICROALBUMINURIA, WITHOUT LONG-TERM CURRENT USE OF INSULIN (H): ICD-10-CM

## 2024-05-17 DIAGNOSIS — J32.9 CHRONIC SINUSITIS, UNSPECIFIED LOCATION: ICD-10-CM

## 2024-05-17 DIAGNOSIS — R35.0 BENIGN PROSTATIC HYPERPLASIA WITH URINARY FREQUENCY: ICD-10-CM

## 2024-05-17 DIAGNOSIS — N28.1 CYST OF LEFT KIDNEY: ICD-10-CM

## 2024-05-17 DIAGNOSIS — E66.01 SEVERE OBESITY (H): ICD-10-CM

## 2024-05-17 PROBLEM — E26.9 HYPERALDOSTERONISM (H): Status: RESOLVED | Noted: 2020-07-07 | Resolved: 2024-05-17

## 2024-05-17 LAB
ALBUMIN UR-MCNC: NEGATIVE MG/DL
APPEARANCE UR: CLEAR
BILIRUB UR QL STRIP: NEGATIVE
COLOR UR AUTO: YELLOW
ERYTHROCYTE [DISTWIDTH] IN BLOOD BY AUTOMATED COUNT: 13.6 % (ref 10–15)
GLUCOSE UR STRIP-MCNC: NEGATIVE MG/DL
HBA1C MFR BLD: 5.6 % (ref 0–5.6)
HCT VFR BLD AUTO: 40.9 % (ref 40–53)
HGB BLD-MCNC: 13.3 G/DL (ref 13.3–17.7)
HGB UR QL STRIP: NEGATIVE
KETONES UR STRIP-MCNC: NEGATIVE MG/DL
LEUKOCYTE ESTERASE UR QL STRIP: NEGATIVE
MCH RBC QN AUTO: 30.9 PG (ref 26.5–33)
MCHC RBC AUTO-ENTMCNC: 32.5 G/DL (ref 31.5–36.5)
MCV RBC AUTO: 95 FL (ref 78–100)
NITRATE UR QL: NEGATIVE
PH UR STRIP: 5.5 [PH] (ref 5–8)
PLATELET # BLD AUTO: 420 10E3/UL (ref 150–450)
RBC # BLD AUTO: 4.3 10E6/UL (ref 4.4–5.9)
SP GR UR STRIP: 1.02 (ref 1–1.03)
UROBILINOGEN UR STRIP-ACNC: 0.2 E.U./DL
WBC # BLD AUTO: 6.9 10E3/UL (ref 4–11)

## 2024-05-17 PROCEDURE — G0438 PPPS, INITIAL VISIT: HCPCS | Performed by: INTERNAL MEDICINE

## 2024-05-17 PROCEDURE — 80053 COMPREHEN METABOLIC PANEL: CPT | Performed by: INTERNAL MEDICINE

## 2024-05-17 PROCEDURE — 83036 HEMOGLOBIN GLYCOSYLATED A1C: CPT | Performed by: INTERNAL MEDICINE

## 2024-05-17 PROCEDURE — 99214 OFFICE O/P EST MOD 30 MIN: CPT | Mod: 25 | Performed by: INTERNAL MEDICINE

## 2024-05-17 PROCEDURE — 84550 ASSAY OF BLOOD/URIC ACID: CPT | Performed by: INTERNAL MEDICINE

## 2024-05-17 PROCEDURE — 85027 COMPLETE CBC AUTOMATED: CPT | Performed by: INTERNAL MEDICINE

## 2024-05-17 PROCEDURE — 82043 UR ALBUMIN QUANTITATIVE: CPT | Performed by: INTERNAL MEDICINE

## 2024-05-17 PROCEDURE — 80061 LIPID PANEL: CPT | Performed by: INTERNAL MEDICINE

## 2024-05-17 PROCEDURE — 81003 URINALYSIS AUTO W/O SCOPE: CPT | Performed by: INTERNAL MEDICINE

## 2024-05-17 PROCEDURE — 36415 COLL VENOUS BLD VENIPUNCTURE: CPT | Performed by: INTERNAL MEDICINE

## 2024-05-17 PROCEDURE — 82570 ASSAY OF URINE CREATININE: CPT | Performed by: INTERNAL MEDICINE

## 2024-05-17 RX ORDER — RESPIRATORY SYNCYTIAL VIRUS VACCINE 120MCG/0.5
0.5 KIT INTRAMUSCULAR ONCE
Qty: 1 EACH | Refills: 0 | Status: CANCELLED | OUTPATIENT
Start: 2024-05-17 | End: 2024-05-17

## 2024-05-17 RX ORDER — LOSARTAN POTASSIUM 100 MG/1
100 TABLET ORAL DAILY
Qty: 90 TABLET | Refills: 4 | Status: SHIPPED | OUTPATIENT
Start: 2024-05-17

## 2024-05-17 RX ORDER — ROSUVASTATIN CALCIUM 5 MG/1
5 TABLET, COATED ORAL DAILY
Qty: 90 TABLET | Refills: 4 | Status: SHIPPED | OUTPATIENT
Start: 2024-05-17

## 2024-05-17 ASSESSMENT — PAIN SCALES - GENERAL: PAINLEVEL: NO PAIN (0)

## 2024-05-17 NOTE — PATIENT INSTRUCTIONS
"Preventive Care Advice   This is general advice we often give to help people stay healthy. Your care team may have specific advice just for you. Please talk to your care team about your own preventive care needs.  Lifestyle  Exercise at least 150 minutes each week (30 minutes a day, 5 days a week).  Do muscle strengthening activities 2 days a week. These help control your weight and prevent disease.  No smoking.  Wear sunscreen to prevent skin cancer.  Have your home tested for radon every 2 to 5 years. Radon is a colorless, odorless gas that can harm your lungs. To learn more, go to www.health.[facility].mn. and search for \"Radon in Homes.\"  Keep guns unloaded and locked up in a safe place like a safe or gun vault, or, use a gun lock and hide the keys. Always lock away bullets separately. To learn more, visit [place].gov and search for \"safe gun storage.\"  Nutrition  Eat 5 or more servings of fruits and vegetables each day.  Try wheat bread, brown rice and whole grain pasta (instead of white bread, rice, and pasta).  Get enough calcium and vitamin D. Check the label on foods and aim for 100% of the RDA (recommended daily allowance).  Regular exams  Have a dental exam and cleaning every 6 months.  See your health care team every year to talk about:  Any changes in your health.  Any medicines your care team has prescribed.  Preventive care, family planning, and ways to prevent chronic diseases.  Shots (vaccines)   HPV shots (up to age 26), if you've never had them before.  Hepatitis B shots (up to age 59), if you've never had them before.  COVID-19 shot: Get this shot when it's due.  Flu shot: Get a flu shot every year.  Tetanus shot: Get a tetanus shot every 10 years.  Pneumococcal, hepatitis A, and RSV shots: Ask your care team if you need these based on your risk.  Shingles shot (for age 50 and up).  General health tests  Diabetes screening:  Starting at age 35, Get screened for diabetes at least every 3 years.  If " you are younger than age 35, ask your care team if you should be screened for diabetes.  Cholesterol test: At age 39, start having a cholesterol test every 5 years, or more often if advised.  Bone density scan (DEXA): At age 50, ask your care team if you should have this scan for osteoporosis (brittle bones).  Hepatitis C: Get tested at least once in your life.  Abdominal aortic aneurysm screening: Talk to your doctor about having this screening if you:  Have ever smoked; and  Are biologically male; and  Are between the ages of 65 and 75.  STIs (sexually transmitted infections)  Before age 24: Ask your care team if you should be screened for STIs.  After age 24: Get screened for STIs if you're at risk. You are at risk for STIs (including HIV) if:  You are sexually active with more than one person.  You don't use condoms every time.  You or a partner was diagnosed with a sexually transmitted infection.  If you are at risk for HIV, ask about PrEP medicine to prevent HIV.  Get tested for HIV at least once in your life, whether you are at risk for HIV or not.  Cancer screening tests  Cervical cancer screening: If you have a cervix, begin getting regular cervical cancer screening tests at age 21. Most people who have regular screenings with normal results can stop after age 65. Talk about this with your provider.  Breast cancer scan (mammogram): If you've ever had breasts, begin having regular mammograms starting at age 40. This is a scan to check for breast cancer.  Colon cancer screening: It is important to start screening for colon cancer at age 45.  Have a colonoscopy test every 10 years (or more often if you're at risk) Or, ask your provider about stool tests like a FIT test every year or Cologuard test every 3 years.  To learn more about your testing options, visit: www.[employer]/529681.pdf.  For help making a decision, visit: tony/cq85453.  Prostate cancer screening test: If you have a prostate and are age 55  to 69, ask your provider if you would benefit from a yearly prostate cancer screening test.  Lung cancer screening: If you are a current or former smoker age 50 to 80, ask your care team if ongoing lung cancer screenings are right for you.  For informational purposes only. Not to replace the advice of your health care provider. Copyright   2023 [employer]. All rights reserved. Clinically reviewed by the [facility] Transitions Program. [employer] 159204 - REV 04/24.

## 2024-05-17 NOTE — PROGRESS NOTES
Preventive Care Visit  [facility]  Waldo Kerr MD, Internal Medicine  May 17, 2024      SUBJECTIVE:   Manas is a 64 year old, presenting for the following:  Arthritis (Patient is concerned about gout and a dry cough for 6 months.  He is requesting labs as he is concerned with kidney function.)        5/17/2024     7:06 AM   Additional Questions   Roomed by Mary MARTINEZ     Are you in the first 12 months of your Medicare coverage?  No    HPI        Have you ever done Advance Care Planning? (For example, a Health Directive, POLST, or a discussion with a medical provider or your loved ones about your wishes): No, advance care planning information given to patient to review.  Patient plans to discuss their wishes with loved ones or provider.      Healthy Habits  Ability to successfully perform ADLs: Yes  Hearing impairment: Stable  Home Safety: Safe      2/6/2024     2:56 PM   PHQ-2 ( 1999 Pfizer)   Q1: Little interest or pleasure in doing things 0   Q2: Feeling down, depressed or hopeless 0   PHQ-2 Score 0   Q1: Little interest or pleasure in doing things Not at all   Q2: Feeling down, depressed or hopeless Not at all   PHQ-2 Score 0          No data to display              Fall Risk:    Fall risk  Fallen 2 or more times in the past year?: No  Cognitive Screening   1) Repeat 3 items (Leader, Season, Table)    2) Clock draw: NORMAL  3) 3 item recall: Recalls 3 objects  Results: 3 items recalled: COGNITIVE IMPAIRMENT LESS LIKELY    Mini-CogTM Copyright S [employer]. Licensed by the author for use in [facility]; reprinted with permission (michelle@.[email or web address]). All rights reserved.      Reviewed and updated as needed this visit by clinical staff   Tobacco  Allergies  Meds              Reviewed and updated as needed this visit by Provider                  Social History     Tobacco Use    Smoking status: Former     Current packs/day: 0.00     Average packs/day: 1 pack/day for 25.0 years (25.0 ttl  pk-yrs)     Types: Cigarettes     Start date: 1974     Quit date: 1999     Years since quittin.3    Smokeless tobacco: Never   Substance Use Topics    Alcohol use: Yes     Alcohol/week: 10.0 standard drinks of alcohol             2023    11:23 AM   Alcohol Use   Prescreen: >3 drinks/day or >7 drinks/week? Yes   Do you have a current opioid prescription? No  Do you use any other controlled substances or medications that are not prescribed by a provider? None    Current providers sharing in care for this patient include:   Patient Care Team:  Waldo Kerr MD as PCP - General (Internal Medicine)  Miguel Cordon MD as MD (Hematology & Oncology)  Daisha Keller MD as MD (Hematology & Oncology)  Waldo Kerr MD as Assigned PCP  Ginny Dos Santos MD as MD (Otolaryngology)  Benjamin Powell MD as MD (Otolaryngology)  Miguel Cordon MD as Assigned Cancer Care Provider  Benjamin Powell MD as Assigned Surgical Provider  Kathleen Pinedo MD as Assigned Allergy Provider    The following health maintenance items are reviewed in Epic and correct as of today:  Health Maintenance   Topic Date Due    ZOSTER IMMUNIZATION (1 of 2) Never done    RSV VACCINE (Pregnancy & 60+) (1 - 1-dose 60+ series) Never done    COVID-19 Vaccine (3 - Pfizer risk series) 2021    MICROALBUMIN  2024    BMP  2024    LIPID  2024    A1C  2024    INFLUENZA VACCINE (Season Ended) 2024    EYE EXAM  2024    HEMOGLOBIN  2024    MEDICARE ANNUAL WELLNESS VISIT  2025    DIABETIC FOOT EXAM  2025    ANNUAL REVIEW OF HM ORDERS  2025    FALL RISK ASSESSMENT  2025    COLORECTAL CANCER SCREENING  2025    MENINGITIS IMMUNIZATION (3 - Risk 2-dose series) 2025    DTAP/TDAP/TD IMMUNIZATION (2 - Td or Tdap) 2027    ADVANCE CARE PLANNING  05/15/2028    PARATHYROID  Completed    PHOSPHORUS  Completed    HEPATITIS C SCREENING  Completed    PHQ-2 (once per  "calendar year)  Completed    Pneumococcal Vaccine: 65+ Years  Completed    URINALYSIS  Completed    ALK PHOS  Completed    AORTIC ANEURYSM SCREENING (SYSTEM ASSIGNED)  Completed    IPV IMMUNIZATION  Aged Out    HPV IMMUNIZATION  Aged Out    RSV MONOCLONAL ANTIBODY  Aged Out     Review of Systems       OBJECTIVE:   /80 (BP Location: Left arm, Patient Position: Sitting, Cuff Size: Adult Regular)   Pulse 83   Temp 96.9  F (36.1  C) (Tympanic)   Resp 16   Ht 1.727 m (5' 8\")   Wt 122 kg (269 lb)   SpO2 97%   BMI 40.90 kg/m     Estimated body mass index is 40.9 kg/m  as calculated from the following:    Height as of this encounter: 1.727 m (5' 8\").    Weight as of this encounter: 122 kg (269 lb).  Physical Exam  EYES: Eyelids, conjunctiva, and sclera were normal. Pupils were normal. Cornea, iris, and lens were normal bilaterally.  HEAD, EARS, NOSE, MOUTH, AND THROAT: Head and face were normal. Hearing was normal to voice and the ears were normal to external exam. Nose appearance was normal and there was no discharge.   NECK: Neck appearance was normal.   RESPIRATORY: Breathing pattern was normal and the chest moved symmetrically.  Percussion/auscultatory percussion was normal.  Lung sounds were normal and there were no abnormal sounds.  CARDIOVASCULAR: Heart rate and rhythm were normal.  S1 and S2 were normal and there were no extra sounds or murmurs. Peripheral pulses in arms and legs were normal.  Jugular venous pressure was normal.  There was no peripheral edema.  GASTROINTESTINAL: The abdomen was normal in contour.  NEUROLOGIC: The patient was alert and oriented to person, place, time, and circumstance. Speech was normal. Cranial nerves were normal. Motor strength was normal for age. The patient was normally coordinated.  PSYCHIATRIC:  Mood and affect were normal and the patient had normal recent and remote memory. The patient's judgment and insight were normal.    ASSESSMENT / PLAN:   1. Annual physical " exam  This is a 64-year-old man with issues as discussed below    2. Type 2 diabetes mellitus with microalbuminuria, without long-term current use of insulin (H)  Has been well-controlled, continue same add Jardiance for glucose control but also for kidney protection and cardiovascular benefits  - HEMOGLOBIN A1C; Future  - empagliflozin (JARDIANCE) 10 MG TABS tablet; Take 1 tablet (10 mg) by mouth daily  Dispense: 90 tablet; Refill: 1  - HEMOGLOBIN A1C    3. Essential hypertension  Blood pressure control but I would like him to stop chlorthalidone and lisinopril and start losartan and Jardiance.  I think this will help with lowering uric acid.  He knows to come in for a lab/potassium check within a week    4. CKD (chronic kidney disease) stage 4, GFR 15-29 ml/min (H)  As above  - Albumin Random Urine Quantitative with Creat Ratio; Future  - losartan (COZAAR) 100 MG tablet; Take 1 tablet (100 mg) by mouth daily  Dispense: 90 tablet; Refill: 4  - empagliflozin (JARDIANCE) 10 MG TABS tablet; Take 1 tablet (10 mg) by mouth daily  Dispense: 90 tablet; Refill: 1  - Comprehensive metabolic panel; Future  - CBC with platelets; Future  - UA Macroscopic with reflex to Microscopic and Culture - Lab Collect; Future  - Albumin Random Urine Quantitative with Creat Ratio  - Comprehensive metabolic panel  - CBC with platelets  - UA Macroscopic with reflex to Microscopic and Culture - Lab Collect    5. Mixed hyperlipidemia  Strongly recommend he take rosuvastatin he is in agreement  - rosuvastatin (CRESTOR) 5 MG tablet; Take 1 tablet (5 mg) by mouth daily  Dispense: 90 tablet; Refill: 4    6. Coronary artery disease involving native coronary artery of native heart without angina pectoris  Continue secondary prevention including rosuvastatin as  - Lipid panel reflex to direct LDL Fasting; Future  - Lipid panel reflex to direct LDL Fasting    7. Adrenal adenoma, right, s/p adrenalectomy    8. Asplenia after surgical procedure  Does  "not want vaccinations today  9. KIERSTEN not on CPAP    10. Prostate cancer metastatic to intrapelvic lymph node (H)  Following with Dr. Miguel Terry    11. Cyst of left kidney - simple    12. Chronic gout due to renal impairment without tophus, unspecified site  On low-dose allopurinol, adding losartan and stopping chlorthalidone  - Uric acid; Future  - Uric acid    13. Chronic sinusitis, unspecified location  Discussed use of antihistamines and Flonase    14. Benign prostatic hyperplasia with urinary frequency  Stable    15. Severe obesity (H)  Counseling  Reviewed preventive health counseling, as reflected in patient instructions  BMI  Estimated body mass index is 40.9 kg/m  as calculated from the following:    Height as of this encounter: 1.727 m (5' 8\").    Weight as of this encounter: 122 kg (269 lb).   Weight management plan: Discussed healthy diet and exercise guidelines      He reports that he quit smoking about 25 years ago. His smoking use included cigarettes. He started smoking about 50 years ago. He has a 25 pack-year smoking history. He has never used smokeless tobacco.      Appropriate preventive services were discussed with this patient, including applicable screening as appropriate for fall prevention, nutrition, physical activity, Tobacco-use cessation, weight loss and cognition.  Checklist reviewing preventive services available has been given to the patient.    Reviewed patients plan of care and provided an AVS. The Basic Care Plan (routine screening as documented in Health Maintenance) for Manas meets the Care Plan requirement. This Care Plan has been established and reviewed with the Patient.          Signed Electronically by: Waldo Kerr MD    Identified Health Risks  I have reviewed Opioid Use Disorder and Substance Use Disorder risk factors and made any needed referrals.   "

## 2024-05-18 LAB
ALBUMIN SERPL BCG-MCNC: 4.2 G/DL (ref 3.5–5.2)
ALP SERPL-CCNC: 65 U/L (ref 40–150)
ALT SERPL W P-5'-P-CCNC: 27 U/L (ref 0–70)
ANION GAP SERPL CALCULATED.3IONS-SCNC: 11 MMOL/L (ref 7–15)
AST SERPL W P-5'-P-CCNC: 21 U/L (ref 0–45)
BILIRUB SERPL-MCNC: 0.2 MG/DL
BUN SERPL-MCNC: 33.4 MG/DL (ref 8–23)
CALCIUM SERPL-MCNC: 10.2 MG/DL (ref 8.8–10.2)
CHLORIDE SERPL-SCNC: 104 MMOL/L (ref 98–107)
CHOLEST SERPL-MCNC: 248 MG/DL
CREAT SERPL-MCNC: 1.66 MG/DL (ref 0.67–1.17)
CREAT UR-MCNC: 151 MG/DL
DEPRECATED HCO3 PLAS-SCNC: 25 MMOL/L (ref 22–29)
EGFRCR SERPLBLD CKD-EPI 2021: 45 ML/MIN/1.73M2
FASTING STATUS PATIENT QL REPORTED: ABNORMAL
FASTING STATUS PATIENT QL REPORTED: ABNORMAL
GLUCOSE SERPL-MCNC: 114 MG/DL (ref 70–99)
HDLC SERPL-MCNC: 33 MG/DL
LDLC SERPL CALC-MCNC: 177 MG/DL
MICROALBUMIN UR-MCNC: <12 MG/L
MICROALBUMIN/CREAT UR: NORMAL MG/G{CREAT}
NONHDLC SERPL-MCNC: 215 MG/DL
POTASSIUM SERPL-SCNC: 4.9 MMOL/L (ref 3.4–5.3)
PROT SERPL-MCNC: 7.3 G/DL (ref 6.4–8.3)
SODIUM SERPL-SCNC: 140 MMOL/L (ref 135–145)
TRIGL SERPL-MCNC: 188 MG/DL
URATE SERPL-MCNC: 8.1 MG/DL (ref 3.4–7)

## 2024-06-09 ENCOUNTER — MYC MEDICAL ADVICE (OUTPATIENT)
Dept: ONCOLOGY | Facility: HOSPITAL | Age: 64
End: 2024-06-09
Payer: MEDICARE

## 2024-06-10 ENCOUNTER — NURSE TRIAGE (OUTPATIENT)
Dept: ONCOLOGY | Facility: HOSPITAL | Age: 64
End: 2024-06-10
Payer: MEDICARE

## 2024-06-10 DIAGNOSIS — C77.5 PROSTATE CANCER METASTATIC TO INTRAPELVIC LYMPH NODE (H): Primary | ICD-10-CM

## 2024-06-10 DIAGNOSIS — C61 PROSTATE CANCER METASTATIC TO INTRAPELVIC LYMPH NODE (H): Primary | ICD-10-CM

## 2024-06-10 NOTE — TELEPHONE ENCOUNTER
Called pt to follow up with pts reported pain.  Marietta wanted to know if might have done any strenuous activity that might have aggravated the area.  If pt has tried any OTC medications, or heat/ice.  Marietta also looked at pts PSA done in May was up slightly but not significantly enough to suspect cancer. Marietta thought following up with pts PCP to see if this is nerve related.   Left voicemail and sent [employer] message to pt to follow up with pt.  Instructed him to call clinic back prior to 4:15 or send Destinee [employer] message.  Waiting for pt to respond to follow up on information.

## 2024-06-11 ENCOUNTER — NURSE TRIAGE (OUTPATIENT)
Dept: ONCOLOGY | Facility: HOSPITAL | Age: 64
End: 2024-06-11
Payer: MEDICARE

## 2024-06-11 NOTE — TELEPHONE ENCOUNTER
"Patient called back to further discuss his pain with Julisa who called him yesterday.  I did let him know that Julisa was not in today and asked if I could help.  I was able to review the notes from yesterday and patient has not tried taking Tylenol or used heat or ice.  He tells me that the pain is kind of a constant nagging problem that has been going on for the last couple of months.  He states it is at his buttocks \"where my legs attach.\"  He states that it does not radiate.  I did ask him to go ahead and use Tylenol and also try heat and ice.  If 1 or the other, heat or ice works better, he can continue to do that on and off.  If these measures do not seem to help over the next couple of days, I did asked that he reach back out to us to further discuss.  He verbalized understanding and was very appreciative.    Shea Mcgarry RN  "

## 2024-06-19 DIAGNOSIS — I15.9 SECONDARY HYPERTENSION: ICD-10-CM

## 2024-06-20 RX ORDER — DOXAZOSIN 1 MG/1
1 TABLET ORAL DAILY
Qty: 90 TABLET | Refills: 2 | Status: SHIPPED | OUTPATIENT
Start: 2024-06-20

## 2024-07-03 ENCOUNTER — MYC MEDICAL ADVICE (OUTPATIENT)
Dept: INTERNAL MEDICINE | Facility: CLINIC | Age: 64
End: 2024-07-03
Payer: MEDICARE

## 2024-07-03 DIAGNOSIS — C77.5 PROSTATE CANCER METASTATIC TO INTRAPELVIC LYMPH NODE (H): Primary | ICD-10-CM

## 2024-07-03 DIAGNOSIS — C61 PROSTATE CANCER METASTATIC TO INTRAPELVIC LYMPH NODE (H): Primary | ICD-10-CM

## 2024-07-12 ENCOUNTER — LAB (OUTPATIENT)
Dept: LAB | Facility: HOSPITAL | Age: 64
End: 2024-07-12
Payer: MEDICARE

## 2024-07-12 DIAGNOSIS — N18.4 CKD (CHRONIC KIDNEY DISEASE) STAGE 4, GFR 15-29 ML/MIN (H): ICD-10-CM

## 2024-07-12 DIAGNOSIS — C77.5 PROSTATE CANCER METASTATIC TO INTRAPELVIC LYMPH NODE (H): ICD-10-CM

## 2024-07-12 DIAGNOSIS — C61 PROSTATE CANCER METASTATIC TO INTRAPELVIC LYMPH NODE (H): ICD-10-CM

## 2024-07-12 LAB
ANION GAP SERPL CALCULATED.3IONS-SCNC: 9 MMOL/L (ref 7–15)
BUN SERPL-MCNC: 23.5 MG/DL (ref 8–23)
CALCIUM SERPL-MCNC: 9.6 MG/DL (ref 8.8–10.2)
CHLORIDE SERPL-SCNC: 102 MMOL/L (ref 98–107)
CREAT SERPL-MCNC: 1.42 MG/DL (ref 0.67–1.17)
DEPRECATED HCO3 PLAS-SCNC: 26 MMOL/L (ref 22–29)
EGFRCR SERPLBLD CKD-EPI 2021: 54 ML/MIN/1.73M2
GLUCOSE SERPL-MCNC: 107 MG/DL (ref 70–99)
POTASSIUM SERPL-SCNC: 4.8 MMOL/L (ref 3.4–5.3)
PSA SERPL DL<=0.01 NG/ML-MCNC: 0.28 NG/ML (ref 0–4.5)
SODIUM SERPL-SCNC: 137 MMOL/L (ref 135–145)

## 2024-07-12 PROCEDURE — 36415 COLL VENOUS BLD VENIPUNCTURE: CPT

## 2024-07-12 PROCEDURE — 84153 ASSAY OF PSA TOTAL: CPT

## 2024-07-12 PROCEDURE — 80048 BASIC METABOLIC PNL TOTAL CA: CPT

## 2024-07-23 DIAGNOSIS — I10 ESSENTIAL HYPERTENSION: ICD-10-CM

## 2024-07-23 DIAGNOSIS — M10.072 ACUTE IDIOPATHIC GOUT INVOLVING TOE OF LEFT FOOT: ICD-10-CM

## 2024-07-23 RX ORDER — CARVEDILOL 25 MG/1
25 TABLET ORAL 2 TIMES DAILY WITH MEALS
Qty: 180 TABLET | Refills: 2 | Status: SHIPPED | OUTPATIENT
Start: 2024-07-23

## 2024-07-23 RX ORDER — ALLOPURINOL 100 MG/1
50 TABLET ORAL DAILY
Qty: 45 TABLET | Refills: 1 | Status: SHIPPED | OUTPATIENT
Start: 2024-07-23

## 2024-08-08 ENCOUNTER — MYC MEDICAL ADVICE (OUTPATIENT)
Dept: INTERNAL MEDICINE | Facility: CLINIC | Age: 64
End: 2024-08-08
Payer: MEDICARE

## 2024-08-16 ENCOUNTER — NURSE TRIAGE (OUTPATIENT)
Dept: INTERNAL MEDICINE | Facility: CLINIC | Age: 64
End: 2024-08-16
Payer: MEDICARE

## 2024-08-16 DIAGNOSIS — J01.90 ACUTE SINUSITIS WITH SYMPTOMS > 10 DAYS: Primary | ICD-10-CM

## 2024-08-16 NOTE — TELEPHONE ENCOUNTER
"Nurse Triage SBAR    Is this a 2nd Level Triage? YES, LICENSED PRACTITIONER REVIEW IS REQUIRED    Situation: Patient reports dizziness and vertigo symptoms for 8-10 days.     Background:  Diabetic patient on jardiance and semaglutide.     Assessment: Patient states that when he has been dealing with dizziness and feeling like the room is spinning for over a week now. It is especially difficult when he gets out of bed. Denies any recent falls or injuries to his head. This has never happened to him before. Patient thinks he may have a sinus infection. States that he has had \"drainage in massive quantities\" for 2 months. Also wonders if this is related to an inner ear problem since he has had the drainage for so long. Also started taking rosuvastatin in May and wonders if that has anything to do with this.     Protocol Recommended Disposition:   Go to ED Now (Or PCP Triage)    Recommendation:  PCP please advise.      Routed to provider    Does the patient meet one of the following criteria for ADS visit consideration? 16+ years old, with an [facility] PCP     TIP  Providers, please consider if this condition is appropriate for management at one of our Acute and Diagnostic Services sites.     If patient is a good candidate, please use dotphrase <dot>triageresponse and select Refer to ADS to document.      Reason for Disposition   [1] Dizziness (vertigo) present now AND [2] one or more STROKE RISK FACTORS (i.e., hypertension, diabetes, prior stroke/TIA, heart attack)  (Exception: Prior doctor or NP/PA evaluation for this AND no different/worse than usual.)    Additional Information   Negative: [1] Weakness (i.e., paralysis, loss of muscle strength) of the face, arm or leg on one side of the body AND [2] sudden onset AND [3] present now   Negative: [1] Numbness (i.e., loss of sensation) of the face, arm or leg on one side of the body AND [2] sudden onset AND [3] present now   Negative: [1] Loss of speech or garbled speech AND " "[2] sudden onset AND [3] present now   Negative: Difficult to awaken or acting confused (e.g., disoriented, slurred speech)   Negative: Sounds like a life-threatening emergency to the triager   Negative: Followed a head injury   Negative: Followed an ear injury   Negative: Localized weakness or numbness is main symptom   Negative: Dizziness relates to riding in a car, going to an amusement park, etc.   Negative: [1] Dizziness is main symptom AND [2] NO spinning sensation (i.e., vertigo)   Negative: SEVERE dizziness (vertigo) (e.g., unable to walk without assistance)   Negative: Severe headache (e.g., excruciating)   (Exception: Similar to previous migraines.)    Answer Assessment - Initial Assessment Questions  1. DESCRIPTION: \"Describe your dizziness.\"      Spinny  2. VERTIGO: \"Do you feel like either you or the room is spinning or tilting?\"       yes  3. LIGHTHEADED: \"Do you feel lightheaded?\" (e.g., somewhat faint, woozy, weak upon standing)      no  4. SEVERITY: \"How bad is it?\"  \"Can you walk?\"    - MILD: Feels slightly dizzy and unsteady, but is walking normally.    - MODERATE: Feels unsteady when walking, but not falling; interferes with normal activities (e.g., school, work).    - SEVERE: Unable to walk without falling, or requires assistance to walk without falling.      When I wake up and try to get out of bed my eye balls are flipping.   5. ONSET:  \"When did the dizziness begin?\"      8-10 days ago  6. AGGRAVATING FACTORS: \"Does anything make it worse?\" (e.g., standing, change in head position)      Bending over makes it worse  7. CAUSE: \"What do you think is causing the dizziness?\"      Maybe I have vertigo  8. RECURRENT SYMPTOM: \"Have you had dizziness before?\" If Yes, ask: \"When was the last time?\" \"What happened that time?\"      no  9. OTHER SYMPTOMS: \"Do you have any other symptoms?\" (e.g., headache, weakness, numbness, vomiting, earache)      No but I have a sinus infection  10. PREGNANCY: \"Is there " "any chance you are pregnant?\" \"When was your last menstrual period?\"        no    Protocols used: Dizziness - Vertigo-A-AH    "

## 2024-08-16 NOTE — TELEPHONE ENCOUNTER
Notified patient of Dr. Kerr's response. Patient verbalized understanding. Patient scheduled with Dr. Joseph 8/19/24 for follow up on symptoms. Patient has no further questions.

## 2024-08-16 NOTE — TELEPHONE ENCOUNTER
This sounds like a positional vertigo.  This is often an inner ear problem and it certainly could be related to a sinus infection.  Therefore I have sent in an antibiotic to his pharmacy.  I would like him to make an appointment next week for further evaluation, I will be out of town so he could make this with Andree or another provider in the clinic.  If he has any progression of his symptoms, other neurological deficits, vision changes speech changes confusion headache, hearing loss he should be seen more urgently

## 2024-08-19 ENCOUNTER — OFFICE VISIT (OUTPATIENT)
Dept: INTERNAL MEDICINE | Facility: CLINIC | Age: 64
End: 2024-08-19
Payer: MEDICARE

## 2024-08-19 VITALS
SYSTOLIC BLOOD PRESSURE: 118 MMHG | WEIGHT: 273.4 LBS | TEMPERATURE: 96.9 F | DIASTOLIC BLOOD PRESSURE: 74 MMHG | RESPIRATION RATE: 17 BRPM | HEIGHT: 68 IN | BODY MASS INDEX: 41.43 KG/M2 | OXYGEN SATURATION: 96 % | HEART RATE: 77 BPM

## 2024-08-19 DIAGNOSIS — J33.8 NASAL SINUS POLYP: ICD-10-CM

## 2024-08-19 DIAGNOSIS — R80.9 TYPE 2 DIABETES MELLITUS WITH MICROALBUMINURIA, WITHOUT LONG-TERM CURRENT USE OF INSULIN (H): ICD-10-CM

## 2024-08-19 DIAGNOSIS — E11.29 TYPE 2 DIABETES MELLITUS WITH MICROALBUMINURIA, WITHOUT LONG-TERM CURRENT USE OF INSULIN (H): ICD-10-CM

## 2024-08-19 DIAGNOSIS — R42 VERTIGO: ICD-10-CM

## 2024-08-19 DIAGNOSIS — J01.91 ACUTE RECURRENT SINUSITIS, UNSPECIFIED LOCATION: Primary | ICD-10-CM

## 2024-08-19 PROCEDURE — 99213 OFFICE O/P EST LOW 20 MIN: CPT | Performed by: INTERNAL MEDICINE

## 2024-08-19 NOTE — PROGRESS NOTES
Assessment & Plan     Acute recurrent sinusitis, unspecified location  Symptoms much improved with Augmentin, he will finish antibiotic, he knows to take it with food, also discussed to take probiotic.    Discussed long-term decongestion will keep him out of trouble: He should use azelastine spray twice a day since its only lasts 12 hours and use saline daily, can also try Mucinex to help thin out mucus and antihistamines during the day.    He did have allergy testing done in the past which was negative but not sure if it was done in [place] or in [place].    Vertigo  March improved, symptoms are consistent with inner ear problem, possible vestibulitis due to sinus infection.    Nasal sinus polyp  History of resection in the past, since he started on antibiotics, he is ability to smell has improved.    Type 2 diabetes mellitus with microalbuminuria, without long-term current use of insulin (H)  Well-controlled, recent A1c was normal.    He will follow-up with his PCP in October for additional labs.    Subjective   Manas is a 64 year old, presenting for the following health issues:  RECHECK (Follow up on dizziness and sinus symptoms)      8/19/2024    10:52 AM   Additional Questions   Roomed by Abner CARSON RN     History of Present Illness       Reason for visit:  Dizziness sinusitis  Symptom onset:  More than a month  Symptoms include:  Dizziness at certain times  Symptom intensity:  Moderate  Symptom progression:  Improving  Had these symptoms before:  No  What makes it worse:  Bending    He eats 2-3 servings of fruits and vegetables daily.He consumes 0 sweetened beverage(s) daily.He exercises with enough effort to increase his heart rate 10 to 19 minutes per day.  He exercises with enough effort to increase his heart rate 3 or less days per week.   He is taking medications regularly.     Manas is a pleasant 64-year-old male with history of type 2 diabetes, obesity, hypertension, CKD, hyperlipidemia, coronary  "artery disease, adrenal adenoma and adrenalectomy, asplenia, obstructive sleep apnea (no CPAP) entheses, prostate cancer with bone metastases, gout.  He is currently here for follow-up.    He called in with symptoms of sinus pressure and vertigo for several weeks and Dr. Dhaliwal prescribed antibiotics for sinusitis on August 16 and recommended an office evaluation to make sure there was no other concerns.    Patient reports he developed sinus pain and pressure shortly after coming back to [place] from [place] 2 months ago, most recently he started having vertigo symptoms as well which progressed to sinus pressure.  Since he started on Augmentin symptoms significantly improved.  He still continues to have some nasal congestion and uses nasal saline once a day and azelastine spray once a day.  In the past he reports having history of nasal polyps which were resected several years ago and having allergy testing done which was negative (not sure if it was done in [place] or in [place])    Additionally in May when he saw PCP he stopped his chlorthalidone and lisinopril and switch him to losartan and Jardiance, blood pressure today is excellent, repeat BMP showed stable kidney function.    Review of systems: As above      Objective    /74 (BP Location: Left arm, Patient Position: Sitting, Cuff Size: Adult Regular)   Pulse 77   Temp 96.9  F (36.1  C) (Tympanic)   Resp 17   Ht 1.727 m (5' 8\")   Wt 124 kg (273 lb 6.4 oz)   SpO2 96%   BMI 41.57 kg/m    Body mass index is 41.57 kg/m .  Physical Exam   General: well appearing male, alert and oriented x3  EYES: Eyelids, conjunctiva, and sclera were normal. Pupils were normal.   HEAD, EARS, NOSE, MOUTH, AND THROAT: no cervical LAD, no thyromegaly or nodules appreciated. TMs are visualized and normal, oropharynx is clear.  No sinus tenderness to palpation, he is mildly congested when he breathes is through his nose.  RESPIRATORY: respirations non labored, CTA bl, no " wheezes, rales, no forced expiratory wheezing.  CARDIOVASCULAR: Heart rate and rhythm were normal. No murmurs, rubs,gallops. There was no peripheral edema. No carotid bruits.  GASTROINTESTINAL: Positive bowel sounds, abdomen is soft, non tender, non distended.     MUSCULOSKELETAL: Muscle mass was normal for age. No joint synovitis or deformity.  LYMPHATIC: There were no enlarged nodes palpable.  SKIN/HAIR/NAILS: Skin color was normal.  No rashes.  NEUROLOGIC: The patient was alert and oriented.  Speech was normal.  There is no facial asymmetry.   PSYCHIATRIC:  Mood and affect were normal.            Signed Electronically by: Sandra Joseph MD

## 2024-08-19 NOTE — PATIENT INSTRUCTIONS
Increase Azelastine spray to twice a day ( it only works for 12 hours). Continue saline spray daily. Can try regular Mucinex 600 mg once or twice a day to thin out mucus.  If benadryl helped in the past, could try non sedating antihistamine during the day: Claritin or Zyrtec.     2. See Dr Kerr in the fall.

## 2024-09-29 ENCOUNTER — HEALTH MAINTENANCE LETTER (OUTPATIENT)
Age: 64
End: 2024-09-29

## 2024-10-30 ENCOUNTER — MYC MEDICAL ADVICE (OUTPATIENT)
Dept: INTERNAL MEDICINE | Facility: CLINIC | Age: 64
End: 2024-10-30

## 2024-10-30 ENCOUNTER — OFFICE VISIT (OUTPATIENT)
Dept: INTERNAL MEDICINE | Facility: CLINIC | Age: 64
End: 2024-10-30
Payer: COMMERCIAL

## 2024-10-30 VITALS
OXYGEN SATURATION: 93 % | BODY MASS INDEX: 41.8 KG/M2 | HEART RATE: 60 BPM | SYSTOLIC BLOOD PRESSURE: 122 MMHG | HEIGHT: 68 IN | TEMPERATURE: 98.4 F | DIASTOLIC BLOOD PRESSURE: 68 MMHG | WEIGHT: 275.8 LBS

## 2024-10-30 DIAGNOSIS — C61 PROSTATE CANCER METASTATIC TO INTRAPELVIC LYMPH NODE (H): ICD-10-CM

## 2024-10-30 DIAGNOSIS — E66.01 SEVERE OBESITY (H): ICD-10-CM

## 2024-10-30 DIAGNOSIS — I10 ESSENTIAL HYPERTENSION: ICD-10-CM

## 2024-10-30 DIAGNOSIS — R80.9 TYPE 2 DIABETES MELLITUS WITH MICROALBUMINURIA, WITHOUT LONG-TERM CURRENT USE OF INSULIN (H): Primary | ICD-10-CM

## 2024-10-30 DIAGNOSIS — E11.29 TYPE 2 DIABETES MELLITUS WITH MICROALBUMINURIA, WITHOUT LONG-TERM CURRENT USE OF INSULIN (H): Primary | ICD-10-CM

## 2024-10-30 DIAGNOSIS — I25.10 CORONARY ARTERY DISEASE INVOLVING NATIVE CORONARY ARTERY OF NATIVE HEART WITHOUT ANGINA PECTORIS: ICD-10-CM

## 2024-10-30 DIAGNOSIS — C77.5 PROSTATE CANCER METASTATIC TO INTRAPELVIC LYMPH NODE (H): ICD-10-CM

## 2024-10-30 DIAGNOSIS — J32.9 CHRONIC SINUSITIS, UNSPECIFIED LOCATION: ICD-10-CM

## 2024-10-30 DIAGNOSIS — I49.3 PVC'S (PREMATURE VENTRICULAR CONTRACTIONS): ICD-10-CM

## 2024-10-30 DIAGNOSIS — R00.1 BRADYCARDIA: ICD-10-CM

## 2024-10-30 DIAGNOSIS — M10.072 ACUTE IDIOPATHIC GOUT INVOLVING TOE OF LEFT FOOT: ICD-10-CM

## 2024-10-30 DIAGNOSIS — N18.4 CKD (CHRONIC KIDNEY DISEASE) STAGE 4, GFR 15-29 ML/MIN (H): ICD-10-CM

## 2024-10-30 LAB
ATRIAL RATE - MUSE: 72 BPM
DIASTOLIC BLOOD PRESSURE - MUSE: NORMAL MMHG
INTERPRETATION ECG - MUSE: NORMAL
P AXIS - MUSE: -21 DEGREES
PR INTERVAL - MUSE: 120 MS
QRS DURATION - MUSE: 160 MS
QT - MUSE: 452 MS
QTC - MUSE: 494 MS
R AXIS - MUSE: -42 DEGREES
SYSTOLIC BLOOD PRESSURE - MUSE: NORMAL MMHG
T AXIS - MUSE: 21 DEGREES
VENTRICULAR RATE- MUSE: 72 BPM

## 2024-10-30 PROCEDURE — 99214 OFFICE O/P EST MOD 30 MIN: CPT | Performed by: INTERNAL MEDICINE

## 2024-10-30 PROCEDURE — G2211 COMPLEX E/M VISIT ADD ON: HCPCS | Performed by: INTERNAL MEDICINE

## 2024-10-30 PROCEDURE — 93010 ELECTROCARDIOGRAM REPORT: CPT | Performed by: INTERNAL MEDICINE

## 2024-10-30 PROCEDURE — 82043 UR ALBUMIN QUANTITATIVE: CPT | Performed by: INTERNAL MEDICINE

## 2024-10-30 PROCEDURE — 82570 ASSAY OF URINE CREATININE: CPT | Performed by: INTERNAL MEDICINE

## 2024-10-30 PROCEDURE — 93005 ELECTROCARDIOGRAM TRACING: CPT | Performed by: INTERNAL MEDICINE

## 2024-10-30 ASSESSMENT — PAIN SCALES - GENERAL: PAINLEVEL_OUTOF10: NO PAIN (0)

## 2024-10-30 NOTE — PROGRESS NOTES
Office Visit - Follow Up   Manas Martinez   64 year old male    Date of Visit: 10/30/2024    Chief Complaint   Patient presents with    RECHECK     Patient reports they are here to follow up on medications. Patient reports wheezing 'quite a while', since he was last in. Noticing unusual weight gain.         Assessment and Plan   1. Type 2 diabetes mellitus with microalbuminuria, without long-term current use of insulin (H) (Primary)  Continue current management  - Hemoglobin A1c; Future    2. CAD, MI ~'98, PALLAVI RCA  Continue secondary prevention  - Adult Cardiology Eval  Referral; Future    3. CKD (chronic kidney disease) stage 4, GFR 15-29 ml/min (H)  Continue current medications with the exception of doxazosin which she will stop with some sinus issues  - Albumin Random Urine Quantitative with Creat Ratio; Future  - Albumin Random Urine Quantitative with Creat Ratio    4. Essential hypertension  Blood pressure okay as above  - EKG 12-lead, tracing only    5. Chronic sinusitis, unspecified location  Follows with ENT, using nasal saline and topical medication, encourage close follow-up with ENT and recommend stopping doxazosin    6. Prostate cancer metastatic to intrapelvic lymph node (H)  Continue follow-up with oncology    7. Bradycardia  EKG showing frequent PVCs, obtain heart monitor and echocardiogram establish with cardiology  - EKG 12-lead, tracing only    8. Severe obesity (H)  Discussed reduction in calories and carbohydrates, continue Ozempic    9. PVC's (premature ventricular contractions)  - ZIO PATCH 3-7 DAYS (additional cost to patient)  - ZIO PATCH 3-7 DAYS APPLICATION  - Adult Cardiology Eval  Referral; Future  - Echocardiogram Complete; Future      The following high BMI interventions were performed this visit: encouragement to exercise and lifestyle education regarding diet    Return in about 3 months (around 1/30/2025) for Follow up.     History of Present Illness   This 64 year  "old man comes in for follow-up.  Overall things are stable, feeling a bit fatigued and has gained some weight.       Physical Exam   General Appearance:   No acute distress    /68   Pulse 60   Temp 98.4  F (36.9  C) (Tympanic)   Ht 1.727 m (5' 8\")   Wt 125.1 kg (275 lb 12.8 oz)   SpO2 93%   BMI 41.94 kg/m      Heart rate is bradycardic, limb is irregular lungs clear to auscultation bilaterally no edema     Additional Information   Current Outpatient Medications   Medication Sig Dispense Refill    allopurinol (ZYLOPRIM) 100 MG tablet Take 0.5 tablets (50 mg) by mouth daily 45 tablet 1    aspirin (ASA) 81 MG EC tablet Take 1 tablet (81 mg) by mouth daily      carvedilol (COREG) 25 MG tablet Take 1 tablet (25 mg) by mouth 2 times daily (with meals) 180 tablet 2    empagliflozin (JARDIANCE) 10 MG TABS tablet Take 1 tablet (10 mg) by mouth daily 90 tablet 1    leuprolide (LUPRON DEPOT) 22.5 MG kit Inject 22.5 mg into the muscle every 3 months      losartan (COZAAR) 100 MG tablet Take 1 tablet (100 mg) by mouth daily 90 tablet 4    Multiple Vitamins-Minerals (MULTIVITAMIN ADULT, MINERALS, PO) Take 1 tablet by mouth daily      Semaglutide, 2 MG/DOSE, (OZEMPIC) 8 MG/3ML pen Inject 2 mg Subcutaneous every 7 days 9 mL 3    rosuvastatin (CRESTOR) 5 MG tablet Take 1 tablet (5 mg) by mouth daily (Patient not taking: Reported on 10/30/2024) 90 tablet 4       Time:     The longitudinal plan of care for the diagnosis(es)/condition(s) as documented were addressed during this visit. Due to the added complexity in care, I will continue to support Manas in the subsequent management and with ongoing continuity of care.     Waldo Kerr MD  Answers submitted by the patient for this visit:  CKD Visit (Submitted on 10/27/2024)  Chief Complaint: Chronic problems general questions HPI Form  Do you take any over the counter pain medicine?  : Yes   (Submitted on 10/27/2024)  What over the counter medicine are you taking for your " pain?  : Multi vitamin  How often do you take this medicine?: one time daily  General Questionnaire (Submitted on 10/27/2024)  Chief Complaint: Chronic problems general questions HPI Form  What is the reason for your visit today? : Kidney check, wheezing  How many servings of fruits and vegetables do you eat daily?: 2-3  On average, how many sweetened beverages do you drink each day (Examples: soda, juice, sweet tea, etc.  Do NOT count diet or artificially sweetened beverages)?: 0  How many minutes a day do you exercise enough to make your heart beat faster?: 9 or less  How many days a week do you exercise enough to make your heart beat faster?: 3 or less  How many days per week do you miss taking your medication?: 0  Questionnaire about: Chronic problems general questions HPI Form (Submitted on 10/27/2024)  Chief Complaint: Chronic problems general questions HPI Form

## 2024-10-31 ENCOUNTER — MYC MEDICAL ADVICE (OUTPATIENT)
Dept: INTERNAL MEDICINE | Facility: CLINIC | Age: 64
End: 2024-10-31

## 2024-10-31 ENCOUNTER — NURSE TRIAGE (OUTPATIENT)
Dept: INTERNAL MEDICINE | Facility: CLINIC | Age: 64
End: 2024-10-31

## 2024-10-31 ENCOUNTER — LAB (OUTPATIENT)
Dept: INFUSION THERAPY | Facility: HOSPITAL | Age: 64
End: 2024-10-31
Attending: INTERNAL MEDICINE
Payer: COMMERCIAL

## 2024-10-31 ENCOUNTER — ONCOLOGY VISIT (OUTPATIENT)
Dept: ONCOLOGY | Facility: HOSPITAL | Age: 64
End: 2024-10-31
Attending: INTERNAL MEDICINE
Payer: COMMERCIAL

## 2024-10-31 VITALS
WEIGHT: 278.4 LBS | OXYGEN SATURATION: 96 % | RESPIRATION RATE: 16 BRPM | DIASTOLIC BLOOD PRESSURE: 79 MMHG | TEMPERATURE: 97.9 F | SYSTOLIC BLOOD PRESSURE: 143 MMHG | BODY MASS INDEX: 42.33 KG/M2 | HEART RATE: 65 BPM

## 2024-10-31 DIAGNOSIS — C77.5 PROSTATE CANCER METASTATIC TO INTRAPELVIC LYMPH NODE (H): Primary | ICD-10-CM

## 2024-10-31 DIAGNOSIS — C61 PROSTATE CANCER METASTATIC TO INTRAPELVIC LYMPH NODE (H): Primary | ICD-10-CM

## 2024-10-31 DIAGNOSIS — I25.10 CORONARY ARTERY DISEASE INVOLVING NATIVE CORONARY ARTERY OF NATIVE HEART WITHOUT ANGINA PECTORIS: ICD-10-CM

## 2024-10-31 DIAGNOSIS — R80.9 TYPE 2 DIABETES MELLITUS WITH MICROALBUMINURIA, WITHOUT LONG-TERM CURRENT USE OF INSULIN (H): ICD-10-CM

## 2024-10-31 DIAGNOSIS — E11.29 TYPE 2 DIABETES MELLITUS WITH MICROALBUMINURIA, WITHOUT LONG-TERM CURRENT USE OF INSULIN (H): ICD-10-CM

## 2024-10-31 LAB
ALBUMIN SERPL BCG-MCNC: 4.4 G/DL (ref 3.5–5.2)
ALP SERPL-CCNC: 60 U/L (ref 40–150)
ALT SERPL W P-5'-P-CCNC: 40 U/L (ref 0–70)
ANION GAP SERPL CALCULATED.3IONS-SCNC: 14 MMOL/L (ref 7–15)
AST SERPL W P-5'-P-CCNC: 30 U/L (ref 0–45)
BASOPHILS # BLD AUTO: 0.1 10E3/UL (ref 0–0.2)
BASOPHILS NFR BLD AUTO: 2 %
BILIRUB SERPL-MCNC: 0.4 MG/DL
BUN SERPL-MCNC: 29.4 MG/DL (ref 8–23)
BURR CELLS BLD QL SMEAR: SLIGHT
CALCIUM SERPL-MCNC: 10.1 MG/DL (ref 8.8–10.4)
CHLORIDE SERPL-SCNC: 102 MMOL/L (ref 98–107)
CREAT SERPL-MCNC: 1.65 MG/DL (ref 0.67–1.17)
CREAT UR-MCNC: 142 MG/DL
EGFRCR SERPLBLD CKD-EPI 2021: 46 ML/MIN/1.73M2
EOSINOPHIL # BLD AUTO: 0.5 10E3/UL (ref 0–0.7)
EOSINOPHIL NFR BLD AUTO: 9 %
ERYTHROCYTE [DISTWIDTH] IN BLOOD BY AUTOMATED COUNT: 15 % (ref 10–15)
EST. AVERAGE GLUCOSE BLD GHB EST-MCNC: 114 MG/DL
GLUCOSE SERPL-MCNC: 99 MG/DL (ref 70–99)
HBA1C MFR BLD: 5.6 %
HCO3 SERPL-SCNC: 24 MMOL/L (ref 22–29)
HCT VFR BLD AUTO: 43.7 % (ref 40–53)
HGB BLD-MCNC: 14 G/DL (ref 13.3–17.7)
IMM GRANULOCYTES # BLD: 0 10E3/UL
IMM GRANULOCYTES NFR BLD: 0 %
LYMPHOCYTES # BLD AUTO: 1.6 10E3/UL (ref 0.8–5.3)
LYMPHOCYTES NFR BLD AUTO: 28 %
MCH RBC QN AUTO: 30 PG (ref 26.5–33)
MCHC RBC AUTO-ENTMCNC: 32 G/DL (ref 31.5–36.5)
MCV RBC AUTO: 94 FL (ref 78–100)
MICROALBUMIN UR-MCNC: 14.3 MG/L
MICROALBUMIN/CREAT UR: 10.07 MG/G CR (ref 0–17)
MONOCYTES # BLD AUTO: 0.6 10E3/UL (ref 0–1.3)
MONOCYTES NFR BLD AUTO: 11 %
NEUTROPHILS # BLD AUTO: 2.8 10E3/UL (ref 1.6–8.3)
NEUTROPHILS NFR BLD AUTO: 50 %
NRBC # BLD AUTO: 0 10E3/UL
NRBC BLD AUTO-RTO: 0 /100
PLAT MORPH BLD: ABNORMAL
PLATELET # BLD AUTO: 342 10E3/UL (ref 150–450)
POTASSIUM SERPL-SCNC: 4.5 MMOL/L (ref 3.4–5.3)
PROT SERPL-MCNC: 7.4 G/DL (ref 6.4–8.3)
RBC # BLD AUTO: 4.67 10E6/UL (ref 4.4–5.9)
RBC MORPH BLD: ABNORMAL
SODIUM SERPL-SCNC: 140 MMOL/L (ref 135–145)
URATE SERPL-MCNC: 7.4 MG/DL (ref 3.4–7)
WBC # BLD AUTO: 5.6 10E3/UL (ref 4–11)

## 2024-10-31 PROCEDURE — 84153 ASSAY OF PSA TOTAL: CPT

## 2024-10-31 PROCEDURE — 85004 AUTOMATED DIFF WBC COUNT: CPT

## 2024-10-31 PROCEDURE — 250N000011 HC RX IP 250 OP 636: Mod: JZ | Performed by: NURSE PRACTITIONER

## 2024-10-31 PROCEDURE — 83036 HEMOGLOBIN GLYCOSYLATED A1C: CPT

## 2024-10-31 PROCEDURE — 99214 OFFICE O/P EST MOD 30 MIN: CPT | Mod: 25 | Performed by: NURSE PRACTITIONER

## 2024-10-31 PROCEDURE — 96402 CHEMO HORMON ANTINEOPL SQ/IM: CPT

## 2024-10-31 PROCEDURE — 80048 BASIC METABOLIC PNL TOTAL CA: CPT

## 2024-10-31 PROCEDURE — 84550 ASSAY OF BLOOD/URIC ACID: CPT

## 2024-10-31 PROCEDURE — 99214 OFFICE O/P EST MOD 30 MIN: CPT | Performed by: NURSE PRACTITIONER

## 2024-10-31 PROCEDURE — 36415 COLL VENOUS BLD VENIPUNCTURE: CPT

## 2024-10-31 PROCEDURE — G2211 COMPLEX E/M VISIT ADD ON: HCPCS | Performed by: NURSE PRACTITIONER

## 2024-10-31 RX ADMIN — LEUPROLIDE ACETATE 45 MG: 45 INJECTION, SUSPENSION, EXTENDED RELEASE SUBCUTANEOUS at 14:40

## 2024-10-31 ASSESSMENT — PAIN SCALES - GENERAL: PAINLEVEL_OUTOF10: NO PAIN (0)

## 2024-10-31 NOTE — TELEPHONE ENCOUNTER
If his heart rate is below 50 and he is symptomatic (redness of breath, dizziness, presyncope), he should come to the emergency room.  Due to slow heart rates, I would recommend decreasing his Coreg dose to half tablet twice a day.  Monitor heart rates at home and update Dr. Dhaliwal in the morning.

## 2024-10-31 NOTE — TELEPHONE ENCOUNTER
Nurse Triage SBAR    Is this a 2nd Level Triage? YES, LICENSED PRACTITIONER REVIEW IS REQUIRED    Situation: Patient sent Mychart stating his heart rate is ranging from 35-65.     Background:  Saw Dr. Kerr 10/30/24. EKG was performed. Patient has appointment with cardiology next week and will be getting a Ziopatch. Echocardiogram ordered as well. Hx of MI, stents in place and RBB. Patient reports that he has not been wearing his cpap.         Assessment:  While on the phone with patient he initially states that his monitor says his heart rate is 65. Conversation continued and patient states his heart rate dropped to 35. Patient denies chest pain, dizziness or light headedness. Patient denies diaphoresis, arm pain or jaw pain. Alert and oriented.     Protocol Recommended Disposition:   Go to ED Now    Recommendation:  Patient declines ED as he was just seen with Dr. Kerr and going to have further work up done. RN advised patient on red flag symptoms that would warrant an ER visit or 911 call. Also educated patient on importance of proper treatment for his KIERSTEN due to the negative affects on the heart that sleep apnea can cause.     Routed to provider    Does the patient meet one of the following criteria for ADS visit consideration? 16+ years old, with an [facility] PCP     TIP  Providers, please consider if this condition is appropriate for management at one of our Acute and Diagnostic Services sites.     If patient is a good candidate, please use dotphrase <dot>triageresponse and select Refer to ADS to document.    Reason for Disposition   Heart beating very slowly (e.g., < 50 / minute)  (Exception: Athlete and heart rate normal for caller.)    Additional Information   Negative: Passed out (i.e., lost consciousness, collapsed and was not responding)   Negative: Shock suspected (e.g., cold/pale/clammy skin, too weak to stand, low BP, rapid pulse)   Negative: Difficult to awaken or acting confused (e.g., disoriented,  "slurred speech)   Negative: Visible sweat on face or sweat dripping down face   Negative: Unable to walk, or can only walk with assistance (e.g., requires support)   Negative: Received SHOCK from implantable cardiac defibrillator and has persisting symptoms (i.e., palpitations, lightheadedness)   Negative: Dizziness, lightheadedness, or weakness and heart beating very rapidly (e.g., > 140 / minute)   Negative: Dizziness, lightheadedness, or weakness and heart beating very slowly (e.g., < 50 / minute)   Negative: Sounds like a life-threatening emergency to the triager   Negative: Chest pain   Negative: Difficulty breathing   Negative: Dizziness, lightheadedness, or weakness   Negative: Heart beating very rapidly (e.g., > 140 / minute) and present now  (Exception: During exercise.)    Answer Assessment - Initial Assessment Questions  1. DESCRIPTION: \"Please describe your heart rate or heartbeat that you are having\" (e.g., fast/slow, regular/irregular, skipped or extra beats, \"palpitations\")      It feels irregular.   2. ONSET: \"When did it start?\" (Minutes, hours or days)       I don't know- they found it yesterday and it seems to be continuing.   3. DURATION: \"How long does it last\" (e.g., seconds, minutes, hours)      I dont know   4. PATTERN \"Does it come and go, or has it been constant since it started?\"  \"Does it get worse with exertion?\"   \"Are you feeling it now?\"      Comes and goes.   5. TAP: \"Using your hand, can you tap out what you are feeling on a chair or table in front of you, so that I can hear?\" (Note: not all patients can do this)          6. HEART RATE: \"Can you tell me your heart rate?\" \"How many beats in 15 seconds?\"  (Note: not all patients can do this)        64 and regular.   7. RECURRENT SYMPTOM: \"Have you ever had this before?\" If Yes, ask: \"When was the last time?\" and \"What happened that time?\"       No   8. CAUSE: \"What do you think is causing the palpitations?\"      I dont know  9. " "CARDIAC HISTORY: \"Do you have any history of heart disease?\" (e.g., heart attack, angina, bypass surgery, angioplasty, arrhythmia)       4 years ago I had a heart attack.   10. OTHER SYMPTOMS: \"Do you have any other symptoms?\" (e.g., dizziness, chest pain, sweating, difficulty breathing)        No.   11. PREGNANCY: \"Is there any chance you are pregnant?\" \"When was your last menstrual period?\"        no    Protocols used: Heart Rate and Heartbeat [id number]    "

## 2024-10-31 NOTE — PROGRESS NOTES
Infusion Nursing Note:  Manas Martinez presents today for Eligard.    Patient seen by provider today: Yes   present during visit today: Not Applicable.    Note: N/A.      Intravenous Access:  No Intravenous access/labs at this visit.    Treatment Conditions:  Not Applicable.      Post Infusion Assessment:  Patient tolerated injection without incident. Given into left buttocks. Pt iced prior to injection      Discharge Plan:   Patient discharged in stable condition accompanied by: self.  Departure Mode: Ambulatory.      Laina Rangel RN

## 2024-10-31 NOTE — LETTER
"10/31/2024      Manas Martinez  [phone]  [facility] [address]      Dear Colleague,    Thank you for referring your patient, Manas Martinez, to the [facility] [place]. Please see a copy of my visit note below.    Oncology Rooming Note    October 31, 2024 1:33 PM   Manas Martinez is a 64 year old male who presents for:    Chief Complaint   Patient presents with     Oncology Clinic Visit     Return visit 6 months with lab and injection. Prostate cancer metastatic to intrapelvic lymph node.     Initial Vitals: BP (!) 143/79 (BP Location: Left arm, Patient Position: Sitting, Cuff Size: Adult Large)   Pulse 65   Temp 97.9  F (36.6  C) (Oral)   Resp 16   Wt 126.3 kg (278 lb 6.4 oz)   SpO2 96%   BMI 42.33 kg/m   Estimated body mass index is 42.33 kg/m  as calculated from the following:    Height as of 10/30/24: 1.727 m (5' 8\").    Weight as of this encounter: 126.3 kg (278 lb 6.4 oz). Body surface area is 2.46 meters squared.  No Pain (0) Comment: Data Unavailable   No LMP for male patient.  Allergies reviewed: Yes  Medications reviewed: Yes    Medications: Medication refills not needed today.  Pharmacy name entered into [employer]:    [facility][id number] - [place], [place] - [address] AT [facility] [address]  [facility] [address] [facility] #[id number] - [place], [place] - [address] AT [facility] OF [place] [address]    Frailty Screening:   Is the patient here for a new oncology consult visit in cancer care? 2. No      Clinical concerns: none       Shayla Redman, [facility] Hematology and Oncology Progress Note    Patient: Manas Martinez  MRN: [id number]  Date of Service: Oct 31, 2024          Reason for Visit    Chief Complaint   Patient presents with     Oncology Clinic Visit     Return visit 6 months with lab and injection. Prostate cancer metastatic to intrapelvic " lymph node.       Assessment and Plan     Cancer Staging   No matching staging information was found for the patient.      1. Prostate cancer, stage IVb with lymph node mets: pt is currently on Lupron. Was on apalutamide from January 2020 until July 2020. We stopped that due to rising cholesterol and hypertension. Pt did not want to start enzalutamide so has continued on lupron every 6 months. His psa wasn't really elevated at time of diagnosis, so not sure how sensitive of a marker that will be for us. That being said, his psa is currently quite low.  Pending from today.  We will continue to monitor.  He will continue lupron every 6 months and with signs of progression, we will add in abiraterone or enzalutamide.  As long as patient's PSA looks stable he will return in 6 months with repeat labs and to see Dr. Cordon.     2. Adenoma: had adrenalectomy in March 2021. BP is much better and he is feeling good.      3. Left renal mass/cyst: looks to be exophytic cyst on CT.     4.  Renal insufficiency: This has been an ongoing chronic issue for him.  Continue to monitor.  Overall stable.  Keep blood pressure controlled.  Stay hydrated    ECOG Performance    0 - Independent    Distress Screening (within last 30 days)    No data recorded     Pain  Pain Score: No Pain (0)    Problem List    Patient Active Problem List   Diagnosis     CAD, MI ~'98, PALLAVI RCA     Essential hypertension     Asplenia after surgical procedure     KIERSTEN not on CPAP     Adrenal adenoma, right, s/p adrenalectomy     Severe obesity (H)     Benign prostatic hyperplasia with urinary frequency     RBBB     Prostate cancer metastatic to intrapelvic lymph node (H)     CKD (chronic kidney disease) stage 4, GFR 15-29 ml/min (H)     Mixed hyperlipidemia     Cyst of left kidney - simple     Chronic sinusitis, unspecified location     Type 2 diabetes mellitus with microalbuminuria, without long-term current use of insulin (H)         ______________________________________________________________________________    History of Present Illness    Diagnosis:     1.  Metastatic prostate cancer: Biopsy of a retroperitoneal node in December 2019 showed adenocarcinoma.  CT scan showed adenopathy infolding the aortic bifurcation and mild pelvic adenopathy along the internal iliac chains bilaterally.  Bone scan at diagnosis was negative.  PET scan showed avid bilateral external iliac, left common iliac, aortocaval, and left retroperitoneal lymphadenopathy.  The most cephalad note is a 15 x 12 mm aortocaval node immediately anterior to L3.  Prostate MRI shows cancer involving almost the entire gland with broad capsular blurring without obvious evidence of extracapsular extension.  Prostate biopsy shows [place] grade 5+4 = 9, the majority appearing to be 4+4 = 8.    PSA recurrence mid 2023.  2.83 on June 7, 2023.  0.27 in August 2022.  PET scan in June 2023 showed Increased PSMA uptake in the prostate extending into the seminal vesicles bilaterally   more prominent on the left is worrisome for tumor recurrence. Indeterminate mild PSMA uptake in a small left common iliac node possibly representing yvon metastasis.      Treatment:     Initially given Firmagon on December 11, 2019.     He received radiation to the prostate and pelvic nodes at the [facility].  Definitive radiotherapy to 70.2 Gy in 26 fractions of IMRT to the prostate, as well as treatment of the pelvic and para-aortic lymph nodes.  Treatment dates were June 8 through July 14, 2020.     Eligard q6 months and apalutamide started in January 2020.  Only took the apalutamide until July 2020.  Stopped due to hypertension and cholesterol issues. 2 years of eligard completed in December 2021. Then observation.     Recurrence in 2023  ~Systemic therapy recommended by his providers at [facility]. He received a dose of Firmagon July 14, 2023. Eligard started August 11, 2023. He did not want any  antiandrogen therapy.      Interim History:    Patient is here today for 6-month follow-up visit.  Overall patient states that he is doing pretty well.  He says he is tolerating the shots fine but does have hot flashes and fatigue.  He says he is been dealing with some cardiac issues and he says he had a low pulse recently.  He saw his primary care doctor yesterday who is perry have him see a cardiologist next week.  Other than that he denies any syncope or lightheadedness.  He denies new bone or back pain.  Denies any unexplained weight loss.      Review of Systems    Pertinent items are noted in HPI.    Past History    Past Medical History:   Diagnosis Date     Adenocarcinoma of prostate (H)      Allergic rhinitis      Cancer (H)      Coronary artery disease      Deviated septum      Elevated PSA      Hyperlipemia      Hypertension      Joint pain      Myocardial infarction (H) 1999    Non Q wave MI, RCA     Obesity      Other asplenic status      Prediabetes      Sleep apnea      Urinary frequency        PHYSICAL EXAM:  BP (!) 143/79 (BP Location: Left arm, Patient Position: Sitting, Cuff Size: Adult Large)   Pulse 65   Temp 97.9  F (36.6  C) (Oral)   Resp 16   Wt 126.3 kg (278 lb 6.4 oz)   SpO2 96%   BMI 42.33 kg/m    GENERAL: no acute distress. Cooperative in conversation. Here alone  RESP: lungs are clear bilaterally per auscultation. Regular respiratory rate. No wheezes or rhonchi.  CV: Regular, rate and rhythm. No murmurs.  MUSCULOSKELETAL: No lower extremity swelling.   NEURO: non focal. Alert and oriented x3.   PSYCH: within normal limits. No depression or anxiety.  SKIN: warm dry intact   LYMPH: no cervical, supraclavicular  lymphadenopathy        Lab Results    Lab on 10/31/2024   Component Date Value Ref Range Status     Sodium 10/31/2024 140  135 - 145 mmol/L Final     Potassium 10/31/2024 4.5  3.4 - 5.3 mmol/L Final     Carbon Dioxide (CO2) 10/31/2024 24  22 - 29 mmol/L Final     Anion Gap  10/31/2024 14  7 - 15 mmol/L Final     Urea Nitrogen 10/31/2024 29.4 (H)  8.0 - 23.0 mg/dL Final     Creatinine 10/31/2024 1.65 (H)  0.67 - 1.17 mg/dL Final     GFR Estimate 10/31/2024 46 (L)  >60 mL/min/1.73m2 Final    eGFR calculated using 2021 CKD-EPI equation.     Calcium 10/31/2024 10.1  8.8 - 10.4 mg/dL Final    Reference intervals for this test were updated on 7/16/2024 to reflect our healthy population more accurately. There may be differences in the flagging of prior results with similar values performed with this method. Those prior results can be interpreted in the context of the updated reference intervals.     Chloride 10/31/2024 102  98 - 107 mmol/L Final     Glucose 10/31/2024 99  70 - 99 mg/dL Final     Alkaline Phosphatase 10/31/2024 60  40 - 150 U/L Final     AST 10/31/2024 30  0 - 45 U/L Final     ALT 10/31/2024 40  0 - 70 U/L Final     Protein Total 10/31/2024 7.4  6.4 - 8.3 g/dL Final     Albumin 10/31/2024 4.4  3.5 - 5.2 g/dL Final     Bilirubin Total 10/31/2024 0.4  <=1.2 mg/dL Final     Estimated Average Glucose 10/31/2024 114  <117 mg/dL Final     Hemoglobin A1C 10/31/2024 5.6  <5.7 % Final    Normal <5.7%   Prediabetes 5.7-6.4%    Diabetes 6.5% or higher     Note: Adopted from ADA consensus guidelines.     Uric Acid 10/31/2024 7.4 (H)  3.4 - 7.0 mg/dL Final     WBC Count 10/31/2024 5.6  4.0 - 11.0 10e3/uL Preliminary    Preliminary ANC is 2.80     RBC Count 10/31/2024 4.67  4.40 - 5.90 10e6/uL Preliminary     Hemoglobin 10/31/2024 14.0  13.3 - 17.7 g/dL Preliminary     Hematocrit 10/31/2024 43.7  40.0 - 53.0 % Preliminary     MCV 10/31/2024 94  78 - 100 fL Preliminary     MCH 10/31/2024 30.0  26.5 - 33.0 pg Preliminary     MCHC 10/31/2024 32.0  31.5 - 36.5 g/dL Preliminary     RDW 10/31/2024 15.0  10.0 - 15.0 % Preliminary     Platelet Count 10/31/2024 342  150 - 450 10e3/uL Preliminary   Office Visit on 10/30/2024   Component Date Value Ref Range Status     Ventricular Rate 10/30/2024 72   BPM Final     Atrial Rate 10/30/2024 72  BPM Final     NE Interval 10/30/2024 120  ms Final     QRS Duration 10/30/2024 160  ms Final     QT 10/30/2024 452  ms Final     QTc 10/30/2024 494  ms Final     P Axis 10/30/2024 -21  degrees Final     R AXIS 10/30/2024 -42  degrees Final     T Axis 10/30/2024 21  degrees Final     Interpretation ECG 10/30/2024    Final                    Value:Sinus rhythm with frequent Premature ventricular complexes  Left axis deviation  Right bundle branch block  Abnormal ECG  When compared with ECG of 05-Nov-2020 11:42,  Premature ventricular complexes are now Present    Confirmed by BRAULIO MARCUS MD LOC: ([id number]) on 10/30/2024 5:10:51 PM       Creatinine Urine mg/dL 10/30/2024 142.0  mg/dL Final    The reference ranges have not been established in urine creatinine. The results should be integrated into the clinical context for interpretation.     Albumin Urine mg/L 10/30/2024 14.3  mg/L Final    The reference ranges have not been established in urine albumin. The results should be integrated into the clinical context for interpretation.     Albumin Urine mg/g Cr 10/30/2024 10.07  0.00 - 17.00 mg/g Cr Final    Microalbuminuria is defined as an albumin:creatinine ratio of 17 to 299 for males and 25 to 299 for females. A ratio of albumin:creatinine of 300 or higher is indicative of overt proteinuria.  Due to biologic variability, positive results should be confirmed by a second, first-morning random or 24-hour timed urine specimen. If there is discrepancy, a third specimen is recommended. When 2 out of 3 results are in the microalbuminuria range, this is evidence for incipient nephropathy and warrants increased efforts at glucose control, blood pressure control, and institution of therapy with an angiotensin-converting-enzyme (ACE) inhibitor (if the patient can tolerate it).     ]  Lab Results   Component Value Date    PSA 0.28 07/12/2024    PSA 0.24 05/07/2024    PSA 0.16 11/03/2023     PSA 0.24 08/11/2023    PSA 2.83 06/07/2023    PSA 2.38 05/05/2023    PSA 0.27 08/31/2022    PSA <0.10 04/29/2022    PSA <0.10 12/20/2021    PSA <0.1 06/21/2021   ]  Imaging    No results found.    Total time spent with patient in face to face time, chart review and documentation was 30 minutes.        Signed by: KAREN Arevalo CNP      Again, thank you for allowing me to participate in the care of your patient.        Sincerely,        KAREN Arevalo CNP

## 2024-10-31 NOTE — TELEPHONE ENCOUNTER
Spoke with patient and provided MD instructions. Patient verbalizes understanding. Has not taken his evening dose yet and will cut it in half and update in the morning.     Patient verbalizes and repeats back that if he is symptomatic he needs to go to ED.

## 2024-10-31 NOTE — PROGRESS NOTES
"Oncology Rooming Note    October 31, 2024 1:33 PM   Manas Martinez is a 64 year old male who presents for:    Chief Complaint   Patient presents with    Oncology Clinic Visit     Return visit 6 months with lab and injection. Prostate cancer metastatic to intrapelvic lymph node.     Initial Vitals: BP (!) 143/79 (BP Location: Left arm, Patient Position: Sitting, Cuff Size: Adult Large)   Pulse 65   Temp 97.9  F (36.6  C) (Oral)   Resp 16   Wt 126.3 kg (278 lb 6.4 oz)   SpO2 96%   BMI 42.33 kg/m   Estimated body mass index is 42.33 kg/m  as calculated from the following:    Height as of 10/30/24: 1.727 m (5' 8\").    Weight as of this encounter: 126.3 kg (278 lb 6.4 oz). Body surface area is 2.46 meters squared.  No Pain (0) Comment: Data Unavailable   No LMP for male patient.  Allergies reviewed: Yes  Medications reviewed: Yes    Medications: Medication refills not needed today.  Pharmacy name entered into [facility]:    [email or web address] #[id number] - [place], [place] - [address] AT [facility] [address]  [facility]SPECIAL[facility]) [facility] [place] [address] [facility] #[id number] - [place], [place] - [address] AT [facility] OF [place] [address]    Frailty Screening:   Is the patient here for a new oncology consult visit in cancer care? 2. No      Clinical concerns: none       Shayla Redman CMA            "

## 2024-10-31 NOTE — PROGRESS NOTES
[facility] Hematology and Oncology Progress Note    Patient: Manas Martinez  MRN: [id number]  Date of Service: Oct 31, 2024          Reason for Visit    Chief Complaint   Patient presents with    Oncology Clinic Visit     Return visit 6 months with lab and injection. Prostate cancer metastatic to intrapelvic lymph node.       Assessment and Plan     Cancer Staging   No matching staging information was found for the patient.      1. Prostate cancer, stage IVb with lymph node mets: pt is currently on Lupron. Was on apalutamide from January 2020 until July 2020. We stopped that due to rising cholesterol and hypertension. Pt did not want to start enzalutamide so has continued on lupron every 6 months. His psa wasn't really elevated at time of diagnosis, so not sure how sensitive of a marker that will be for us. That being said, his psa is currently quite low.  Pending from today.  We will continue to monitor.  He will continue lupron every 6 months and with signs of progression, we will add in abiraterone or enzalutamide.  As long as patient's PSA looks stable he will return in 6 months with repeat labs and to see Dr. Cordon.     2. Adenoma: had adrenalectomy in March 2021. BP is much better and he is feeling good.      3. Left renal mass/cyst: looks to be exophytic cyst on CT.     4.  Renal insufficiency: This has been an ongoing chronic issue for him.  Continue to monitor.  Overall stable.  Keep blood pressure controlled.  Stay hydrated    ECOG Performance    0 - Independent    Distress Screening (within last 30 days)    No data recorded     Pain  Pain Score: No Pain (0)    Problem List    Patient Active Problem List   Diagnosis    CAD, MI ~'98, PALLAVI RCA    Essential hypertension    Asplenia after surgical procedure    KIERSTEN not on CPAP    Adrenal adenoma, right, s/p adrenalectomy    Severe obesity (H)    Benign prostatic hyperplasia with urinary frequency    RBBB    Prostate cancer metastatic to intrapelvic lymph  node (H)    CKD (chronic kidney disease) stage 4, GFR 15-29 ml/min (H)    Mixed hyperlipidemia    Cyst of left kidney - simple    Chronic sinusitis, unspecified location    Type 2 diabetes mellitus with microalbuminuria, without long-term current use of insulin (H)        ______________________________________________________________________________    History of Present Illness    Diagnosis:     1.  Metastatic prostate cancer: Biopsy of a retroperitoneal node in December 2019 showed adenocarcinoma.  CT scan showed adenopathy infolding the aortic bifurcation and mild pelvic adenopathy along the internal iliac chains bilaterally.  Bone scan at diagnosis was negative.  PET scan showed avid bilateral external iliac, left common iliac, aortocaval, and left retroperitoneal lymphadenopathy.  The most cephalad note is a 15 x 12 mm aortocaval node immediately anterior to L3.  Prostate MRI shows cancer involving almost the entire gland with broad capsular blurring without obvious evidence of extracapsular extension.  Prostate biopsy shows Rosa grade 5+4 = 9, the majority appearing to be 4+4 = 8.    PSA recurrence mid 2023.  2.83 on June 7, 2023.  0.27 in August 2022.  PET scan in June 2023 showed Increased PSMA uptake in the prostate extending into the seminal vesicles bilaterally   more prominent on the left is worrisome for tumor recurrence. Indeterminate mild PSMA uptake in a small left common iliac node possibly representing yvon metastasis.      Treatment:     Initially given Firmagon on December 11, 2019.     He received radiation to the prostate and pelvic nodes at the [facility].  Definitive radiotherapy to 70.2 Gy in 26 fractions of IMRT to the prostate, as well as treatment of the pelvic and para-aortic lymph nodes.  Treatment dates were June 8 through July 14, 2020.     Eligard q6 months and apalutamide started in January 2020.  Only took the apalutamide until July 2020.  Stopped due to hypertension and  cholesterol issues. 2 years of eligard completed in December 2021. Then observation.     Recurrence in 2023  ~Systemic therapy recommended by his providers at [facility]. He received a dose of Firmagon July 14, 2023. Eligard started August 11, 2023. He did not want any antiandrogen therapy.      Interim History:    Patient is here today for 6-month follow-up visit.  Overall patient states that he is doing pretty well.  He says he is tolerating the shots fine but does have hot flashes and fatigue.  He says he is been dealing with some cardiac issues and he says he had a low pulse recently.  He saw his primary care doctor yesterday who is perry have him see a cardiologist next week.  Other than that he denies any syncope or lightheadedness.  He denies new bone or back pain.  Denies any unexplained weight loss.      Review of Systems    Pertinent items are noted in HPI.    Past History    Past Medical History:   Diagnosis Date    Adenocarcinoma of prostate (H)     Allergic rhinitis     Cancer (H)     Coronary artery disease     Deviated septum     Elevated PSA     Hyperlipemia     Hypertension     Joint pain     Myocardial infarction (H) 1999    Non Q wave MI, RCA    Obesity     Other asplenic status     Prediabetes     Sleep apnea     Urinary frequency        PHYSICAL EXAM:  BP (!) 143/79 (BP Location: Left arm, Patient Position: Sitting, Cuff Size: Adult Large)   Pulse 65   Temp 97.9  F (36.6  C) (Oral)   Resp 16   Wt 126.3 kg (278 lb 6.4 oz)   SpO2 96%   BMI 42.33 kg/m    GENERAL: no acute distress. Cooperative in conversation. Here alone  RESP: lungs are clear bilaterally per auscultation. Regular respiratory rate. No wheezes or rhonchi.  CV: Regular, rate and rhythm. No murmurs.  MUSCULOSKELETAL: No lower extremity swelling.   NEURO: non focal. Alert and oriented x3.   PSYCH: within normal limits. No depression or anxiety.  SKIN: warm dry intact   LYMPH: no cervical, supraclavicular   lymphadenopathy        Lab Results    Lab on 10/31/2024   Component Date Value Ref Range Status    Sodium 10/31/2024 140  135 - 145 mmol/L Final    Potassium 10/31/2024 4.5  3.4 - 5.3 mmol/L Final    Carbon Dioxide (CO2) 10/31/2024 24  22 - 29 mmol/L Final    Anion Gap 10/31/2024 14  7 - 15 mmol/L Final    Urea Nitrogen 10/31/2024 29.4 (H)  8.0 - 23.0 mg/dL Final    Creatinine 10/31/2024 1.65 (H)  0.67 - 1.17 mg/dL Final    GFR Estimate 10/31/2024 46 (L)  >60 mL/min/1.73m2 Final    eGFR calculated using 2021 CKD-EPI equation.    Calcium 10/31/2024 10.1  8.8 - 10.4 mg/dL Final    Reference intervals for this test were updated on 7/16/2024 to reflect our healthy population more accurately. There may be differences in the flagging of prior results with similar values performed with this method. Those prior results can be interpreted in the context of the updated reference intervals.    Chloride 10/31/2024 102  98 - 107 mmol/L Final    Glucose 10/31/2024 99  70 - 99 mg/dL Final    Alkaline Phosphatase 10/31/2024 60  40 - 150 U/L Final    AST 10/31/2024 30  0 - 45 U/L Final    ALT 10/31/2024 40  0 - 70 U/L Final    Protein Total 10/31/2024 7.4  6.4 - 8.3 g/dL Final    Albumin 10/31/2024 4.4  3.5 - 5.2 g/dL Final    Bilirubin Total 10/31/2024 0.4  <=1.2 mg/dL Final    Estimated Average Glucose 10/31/2024 114  <117 mg/dL Final    Hemoglobin A1C 10/31/2024 5.6  <5.7 % Final    Normal <5.7%   Prediabetes 5.7-6.4%    Diabetes 6.5% or higher     Note: Adopted from ADA consensus guidelines.    Uric Acid 10/31/2024 7.4 (H)  3.4 - 7.0 mg/dL Final    WBC Count 10/31/2024 5.6  4.0 - 11.0 10e3/uL Preliminary    Preliminary ANC is 2.80    RBC Count 10/31/2024 4.67  4.40 - 5.90 10e6/uL Preliminary    Hemoglobin 10/31/2024 14.0  13.3 - 17.7 g/dL Preliminary    Hematocrit 10/31/2024 43.7  40.0 - 53.0 % Preliminary    MCV 10/31/2024 94  78 - 100 fL Preliminary    MCH 10/31/2024 30.0  26.5 - 33.0 pg Preliminary    MCHC 10/31/2024 32.0   31.5 - 36.5 g/dL Preliminary    RDW 10/31/2024 15.0  10.0 - 15.0 % Preliminary    Platelet Count 10/31/2024 342  150 - 450 10e3/uL Preliminary   Office Visit on 10/30/2024   Component Date Value Ref Range Status    Ventricular Rate 10/30/2024 72  BPM Final    Atrial Rate 10/30/2024 72  BPM Final    CA Interval 10/30/2024 120  ms Final    QRS Duration 10/30/2024 160  ms Final    QT 10/30/2024 452  ms Final    QTc 10/30/2024 494  ms Final    P Axis 10/30/2024 -21  degrees Final    R AXIS 10/30/2024 -42  degrees Final    T Axis 10/30/2024 21  degrees Final    Interpretation ECG 10/30/2024    Final                    Value:Sinus rhythm with frequent Premature ventricular complexes  Left axis deviation  Right bundle branch block  Abnormal ECG  When compared with ECG of 05-Nov-2020 11:42,  Premature ventricular complexes are now Present    Confirmed by BRAULIO MARCUS MD LOC: ([id number]) on 10/30/2024 5:10:51 PM      Creatinine Urine mg/dL 10/30/2024 142.0  mg/dL Final    The reference ranges have not been established in urine creatinine. The results should be integrated into the clinical context for interpretation.    Albumin Urine mg/L 10/30/2024 14.3  mg/L Final    The reference ranges have not been established in urine albumin. The results should be integrated into the clinical context for interpretation.    Albumin Urine mg/g Cr 10/30/2024 10.07  0.00 - 17.00 mg/g Cr Final    Microalbuminuria is defined as an albumin:creatinine ratio of 17 to 299 for males and 25 to 299 for females. A ratio of albumin:creatinine of 300 or higher is indicative of overt proteinuria.  Due to biologic variability, positive results should be confirmed by a second, first-morning random or 24-hour timed urine specimen. If there is discrepancy, a third specimen is recommended. When 2 out of 3 results are in the microalbuminuria range, this is evidence for incipient nephropathy and warrants increased efforts at glucose control, blood pressure  control, and institution of therapy with an angiotensin-converting-enzyme (ACE) inhibitor (if the patient can tolerate it).     ]  Lab Results   Component Value Date    PSA 0.28 07/12/2024    PSA 0.24 05/07/2024    PSA 0.16 11/03/2023    PSA 0.24 08/11/2023    PSA 2.83 06/07/2023    PSA 2.38 05/05/2023    PSA 0.27 08/31/2022    PSA <0.10 04/29/2022    PSA <0.10 12/20/2021    PSA <0.1 06/21/2021   ]  Imaging    No results found.    Total time spent with patient in face to face time, chart review and documentation was 30 minutes.        Signed by: KAREN Arevalo CNP

## 2024-11-01 LAB — PSA SERPL DL<=0.01 NG/ML-MCNC: 0.37 NG/ML (ref 0–4.5)

## 2024-11-01 RX ORDER — ALLOPURINOL 100 MG/1
100 TABLET ORAL DAILY
Qty: 90 TABLET | Refills: 4 | Status: SHIPPED | OUTPATIENT
Start: 2024-11-01

## 2024-11-06 ENCOUNTER — OFFICE VISIT (OUTPATIENT)
Dept: CARDIOLOGY | Facility: CLINIC | Age: 64
End: 2024-11-06
Attending: INTERNAL MEDICINE
Payer: COMMERCIAL

## 2024-11-06 ENCOUNTER — ORDERS ONLY (AUTO-RELEASED) (OUTPATIENT)
Dept: CARDIOLOGY | Facility: CLINIC | Age: 64
End: 2024-11-06

## 2024-11-06 VITALS
HEART RATE: 71 BPM | WEIGHT: 272 LBS | SYSTOLIC BLOOD PRESSURE: 130 MMHG | RESPIRATION RATE: 18 BRPM | DIASTOLIC BLOOD PRESSURE: 85 MMHG | HEIGHT: 68 IN | BODY MASS INDEX: 41.22 KG/M2

## 2024-11-06 DIAGNOSIS — I49.3 PVC'S (PREMATURE VENTRICULAR CONTRACTIONS): ICD-10-CM

## 2024-11-06 DIAGNOSIS — I25.10 CORONARY ARTERY DISEASE INVOLVING NATIVE CORONARY ARTERY OF NATIVE HEART WITHOUT ANGINA PECTORIS: Primary | ICD-10-CM

## 2024-11-06 DIAGNOSIS — I10 ESSENTIAL HYPERTENSION: ICD-10-CM

## 2024-11-06 PROCEDURE — 99204 OFFICE O/P NEW MOD 45 MIN: CPT | Performed by: STUDENT IN AN ORGANIZED HEALTH CARE EDUCATION/TRAINING PROGRAM

## 2024-11-06 PROCEDURE — G2211 COMPLEX E/M VISIT ADD ON: HCPCS | Performed by: STUDENT IN AN ORGANIZED HEALTH CARE EDUCATION/TRAINING PROGRAM

## 2024-11-06 NOTE — PATIENT INSTRUCTIONS
It was a pleasure meeting you today    In summary:    We will consider starting you on Repatha next visit for the cholesterol.  We will do a 3 day ziopatch monitor and I will follow up on your echocardiogram.    Please call my nurse Malik Arias at [phone] if you have any questions or issues.    We will schedule a follow up visit in  6 months      Juan Luis Guillen DO [facility]  Non-invasive Cardiologist  [facility]

## 2024-11-06 NOTE — LETTER
11/6/2024    Waldo Kerr MD  [address] [facility] [address]    RE: Manas Martinez       Dear Colleague,     I had the pleasure of seeing Manas Martinez in the [facility].    [facility]   [address]  [place] [address]   [email or web address]   OFFICE: [phone]     CARDIOLOGY CLINIC NOTE     Thank you, Waldo Swanson, for asking the [facility] team to see Mr. Manas Martinez to evaluate Consult        History of Present Illness   Mr. Manas Martinez is a 64 year old male with a significant past history of CAD s/p RCA PALLAVI in 1999, DM2, CKD, HTN, PVCs, statin myalgias, HLD, who presents for evaluation.  The patient notes recently he was found to have bradycardia when evaluating with a pulse oximeter.  HR would jump between 35 and 70.  He did not feel any different.  He can appreciate an irregularity to his pulse when he feels his radial pulse but does not feel it in his chest.  He denies any chest symptoms. He did get very winded when having to climb 4 flights of stairs.  He denies chest pain.  He did have chest pain radiating to his L arm with his MI in 1999.  At that time he was drinking and smoking.  He was recently told to cut his carvedilol in half after contacting the nurse line.           Medications  Allergies   Current Outpatient Medications   Medication Sig Dispense Refill     allopurinol (ZYLOPRIM) 100 MG tablet Take 1 tablet (100 mg) by mouth daily. 90 tablet 4     aspirin (ASA) 81 MG EC tablet Take 1 tablet (81 mg) by mouth daily       carvedilol (COREG) 25 MG tablet Take 1 tablet (25 mg) by mouth 2 times daily (with meals) 180 tablet 2     empagliflozin (JARDIANCE) 10 MG TABS tablet Take 1 tablet (10 mg) by mouth daily 90 tablet 1     leuprolide (LUPRON DEPOT) 22.5 MG kit Inject 22.5 mg into the muscle every 3 months       losartan (COZAAR) 100 MG tablet Take 1 tablet (100 mg) by mouth daily 90 tablet  4     Multiple Vitamins-Minerals (MULTIVITAMIN ADULT, MINERALS, PO) Take 1 tablet by mouth daily       rosuvastatin (CRESTOR) 5 MG tablet Take 1 tablet (5 mg) by mouth daily (Patient not taking: Reported on 10/31/2024) 90 tablet 4     Semaglutide, 2 MG/DOSE, (OZEMPIC) 8 MG/3ML pen Inject 2 mg Subcutaneous every 7 days 9 mL 3      Allergies   Allergen Reactions     Atorvastatin Other (See Comments)     Leg Cramps        Physical Examination Review of Systems   Vitals: There were no vitals taken for this visit.  BMI= There is no height or weight on file to calculate BMI.  Wt Readings from Last 3 Encounters:   10/31/24 126.3 kg (278 lb 6.4 oz)   10/30/24 125.1 kg (275 lb 12.8 oz)   24 124 kg (273 lb 6.4 oz)       General: pleasant male. No acute distress.   Neck: No JVD  Lungs: clear to auscultation  COR:  regular rate and rhythm, No murmurs, rubs, or gallops  Extrem: No edema        Please refer above for cardiac ROS details.       Past History     Family History:   Family History   Problem Relation Age of Onset     Hypertension Mother      Diabetes Mother      Chronic Obstructive Pulmonary Disease Mother      Hypertension Father      Diabetes Father      Prostate Cancer Father      Pancreatic Cancer Father      Hypertension Brother      Prostate Cancer Brother      Hypertension Brother      Hypertension Brother      Diabetes Paternal Grandmother      Diabetes Paternal Grandfather         Social History:   Social History     Socioeconomic History     Marital status:      Spouse name: Not on file     Number of children: 0     Years of education: 16     Highest education level: Not on file   Occupational History     Not on file   Tobacco Use     Smoking status: Former     Current packs/day: 0.00     Average packs/day: 1 pack/day for 25.0 years (25.0 ttl pk-yrs)     Types: Cigarettes     Start date: 1974     Quit date: 1999     Years since quittin.8     Smokeless tobacco: Never   Vaping Use      Vaping status: Never Used   Substance and Sexual Activity     Alcohol use: Yes     Alcohol/week: 10.0 standard drinks of alcohol     Drug use: No     Sexual activity: Not Currently     Partners: Female     Birth control/protection: Surgical     Comment: Partner = Tubaligation   Other Topics Concern     Not on file   Social History Narrative    Lives with his wife, Cary.  Retired , [employer].  No biological children.  Three step children.  Fourth step child  from leukemia.     Social Drivers of Health     Financial Resource Strain: Low Risk  (10/19/2023)    Financial Resource Strain      Within the past 12 months, have you or your family members you live with been unable to get utilities (heat, electricity) when it was really needed?: No   Food Insecurity: Low Risk  (10/19/2023)    Food Insecurity      Within the past 12 months, did you worry that your food would run out before you got money to buy more?: No      Within the past 12 months, did the food you bought just not last and you didn t have money to get more?: No   Transportation Needs: Low Risk  (10/19/2023)    Transportation Needs      Within the past 12 months, has lack of transportation kept you from medical appointments, getting your medicines, non-medical meetings or appointments, work, or from getting things that you need?: No   Physical Activity: Insufficiently Active (2022)    Received from [facility]    Exercise Vital Sign      Days of Exercise per Week: 2 days      Minutes of Exercise per Session: 30 min   Stress: No Stress Concern Present (2022)    Received from [facility]    [employer] [employer] - Occupational Stress Questionnaire      Feeling of Stress : Not at all   Social Connections: Moderately Isolated (2022)    Received from [facility]    Social Connection and Isolation Panel [NHANES]      Frequency of Communication with Friends and Family: Twice a week       Frequency of Social Gatherings with Friends and Family: Once a week      Attends [employer] Services: Never      Active Member of Clubs or Organizations: No      Attends Club or Organization Meetings: Never      Marital Status:    Interpersonal Safety: Low Risk  (5/17/2024)    Interpersonal Safety      Do you feel physically and emotionally safe where you currently live?: Yes      Within the past 12 months, have you been hit, slapped, kicked or otherwise physically hurt by someone?: No      Within the past 12 months, have you been humiliated or emotionally abused in other ways by your partner or ex-partner?: No   Housing Stability: Low Risk  (10/19/2023)    Housing Stability      Do you have housing? : Yes      Are you worried about losing your housing?: No            Lab Results    Chemistry/lipid CBC Cardiac Enzymes/BNP/TSH/INR   Lab Results   Component Value Date    CHOL 248 (H) 05/17/2024    HDL 33 (L) 05/17/2024    TRIG 188 (H) 05/17/2024    BUN 29.4 (H) 10/31/2024     10/31/2024    CO2 24 10/31/2024    Lab Results   Component Value Date    WBC 5.6 10/31/2024    HGB 14.0 10/31/2024    HCT 43.7 10/31/2024    MCV 94 10/31/2024     10/31/2024    Lab Results   Component Value Date    TSH 3.02 11/03/2023    INR 0.99 11/26/2019          Cardiac Problems and Cardiac Diagnostics     Most Recent Cardiac testing:  ECG Personal interpretation  10/30/2024  NSR, PVCs in a pattern of bigeminy  RBBB  L axis    Stress test: 6/17/2019    The patient's exercise capacity is mildly impaired. Markedly hypertensive response noted.    The exercise nuclear stress test is negative for inducible myocardial ischemia or infarction.    The left ventricular ejection fraction is 55%.    There is no prior study available.         Assessment/Recommendations   Assessment:    Mr. Manas Martinez is a 64 year old male with a significant past history of CAD s/p RCA PALLAVI in 1999, DM2, CKD, HTN, PVCs, statin myalgias, HLD, who  "presents for evaluation.    PVCs   - Frequent PVCs captured on ECG.  Likely responsible for \"bradycardia\" which is a spurious reading on pulse oximeter capturing only every other heart beat   - Resume carvedilol 25mg BID   - Plan on 3 day ziopatch to assess PVC burden   - TTE to evaluate heart structure and function, scheduled for next week.      CAD   - Reportedly 2 stents to RCA   - Evidence of CAD or stent to LAD on CT scan   - Not currently taking statin.  Has a hx of myalgias with simvastatin, atorvastatin, pravastatin, and rosuvastatin   - .  Goal LDL <70.  Recommend starting a PCSK9i, but he wants to hold off and think about it.  Will readdress next visit.    HTN   - Well controlled   - Continue current management, resuming 25mg dose of carvedilol.    RTC 6 months    The longitudinal plan of care for the diagnosis(es)/condition(s) as documented were addressed during this visit. Due to the added complexity in care, I will continue to support Manas in the subsequent management and with ongoing continuity of care.           Juan Luis Guillen DO [facility]  Non-invasive Cardiologist  [facility]         Thank you for allowing me to participate in the care of your patient.      Sincerely,     Juan Luis Guillen DO     [facility]  cc:   Waldo Kerr MD  [address] [place] [id number]      "

## 2024-11-06 NOTE — PROGRESS NOTES
[facility] HEART C[facility]   [address]  [place] [address]   [email or web address]   OFFICE: [phone]     CARDIOLOGY CLINIC NOTE     Thank you, Waldo Swanson, for asking the [facility] Heart Care team to see Mr. Manas Martinez to evaluate Consult        History of Present Illness   Mr. Manas Martinez is a 64 year old male with a significant past history of CAD s/p RCA PALLAVI in 1999, DM2, CKD, HTN, PVCs, statin myalgias, HLD, who presents for evaluation.  The patient notes recently he was found to have bradycardia when evaluating with a pulse oximeter.  HR would jump between 35 and 70.  He did not feel any different.  He can appreciate an irregularity to his pulse when he feels his radial pulse but does not feel it in his chest.  He denies any chest symptoms. He did get very winded when having to climb 4 flights of stairs.  He denies chest pain.  He did have chest pain radiating to his L arm with his MI in 1999.  At that time he was drinking and smoking.  He was recently told to cut his carvedilol in half after contacting the nurse line.           Medications  Allergies   Current Outpatient Medications   Medication Sig Dispense Refill    allopurinol (ZYLOPRIM) 100 MG tablet Take 1 tablet (100 mg) by mouth daily. 90 tablet 4    aspirin (ASA) 81 MG EC tablet Take 1 tablet (81 mg) by mouth daily      carvedilol (COREG) 25 MG tablet Take 1 tablet (25 mg) by mouth 2 times daily (with meals) 180 tablet 2    empagliflozin (JARDIANCE) 10 MG TABS tablet Take 1 tablet (10 mg) by mouth daily 90 tablet 1    leuprolide (LUPRON DEPOT) 22.5 MG kit Inject 22.5 mg into the muscle every 3 months      losartan (COZAAR) 100 MG tablet Take 1 tablet (100 mg) by mouth daily 90 tablet 4    Multiple Vitamins-Minerals (MULTIVITAMIN ADULT, MINERALS, PO) Take 1 tablet by mouth daily      rosuvastatin (CRESTOR) 5 MG tablet Take 1 tablet (5 mg) by mouth daily (Patient not taking: Reported on  10/31/2024) 90 tablet 4    Semaglutide, 2 MG/DOSE, (OZEMPIC) 8 MG/3ML pen Inject 2 mg Subcutaneous every 7 days 9 mL 3      Allergies   Allergen Reactions    Atorvastatin Other (See Comments)     Leg Cramps        Physical Examination Review of Systems   Vitals: There were no vitals taken for this visit.  BMI= There is no height or weight on file to calculate BMI.  Wt Readings from Last 3 Encounters:   10/31/24 126.3 kg (278 lb 6.4 oz)   10/30/24 125.1 kg (275 lb 12.8 oz)   24 124 kg (273 lb 6.4 oz)       General: pleasant male. No acute distress.   Neck: No JVD  Lungs: clear to auscultation  COR:  regular rate and rhythm, No murmurs, rubs, or gallops  Extrem: No edema        Please refer above for cardiac ROS details.       Past History     Family History:   Family History   Problem Relation Age of Onset    Hypertension Mother     Diabetes Mother     Chronic Obstructive Pulmonary Disease Mother     Hypertension Father     Diabetes Father     Prostate Cancer Father     Pancreatic Cancer Father     Hypertension Brother     Prostate Cancer Brother     Hypertension Brother     Hypertension Brother     Diabetes Paternal Grandmother     Diabetes Paternal Grandfather         Social History:   Social History     Socioeconomic History    Marital status:      Spouse name: Not on file    Number of children: 0    Years of education: 16    Highest education level: Not on file   Occupational History    Not on file   Tobacco Use    Smoking status: Former     Current packs/day: 0.00     Average packs/day: 1 pack/day for 25.0 years (25.0 ttl pk-yrs)     Types: Cigarettes     Start date: 1974     Quit date: 1999     Years since quittin.8    Smokeless tobacco: Never   Vaping Use    Vaping status: Never Used   Substance and Sexual Activity    Alcohol use: Yes     Alcohol/week: 10.0 standard drinks of alcohol    Drug use: No    Sexual activity: Not Currently     Partners: Female     Birth control/protection:  Surgical     Comment: Partner = Tubaligation   Other Topics Concern    Not on file   Social History Narrative    Lives with his wife, Cary.  Retired , [employer].  No biological children.  Three step children.  Fourth step child  from leukemia.     Social Drivers of Health     Financial Resource Strain: Low Risk  (10/19/2023)    Financial Resource Strain     Within the past 12 months, have you or your family members you live with been unable to get utilities (heat, electricity) when it was really needed?: No   Food Insecurity: Low Risk  (10/19/2023)    Food Insecurity     Within the past 12 months, did you worry that your food would run out before you got money to buy more?: No     Within the past 12 months, did the food you bought just not last and you didn t have money to get more?: No   Transportation Needs: Low Risk  (10/19/2023)    Transportation Needs     Within the past 12 months, has lack of transportation kept you from medical appointments, getting your medicines, non-medical meetings or appointments, work, or from getting things that you need?: No   Physical Activity: Insufficiently Active (2022)    Received from [facility]    Exercise Vital Sign     Days of Exercise per Week: 2 days     Minutes of Exercise per Session: 30 min   Stress: No Stress Concern Present (2022)    Received from [facility]    [employer] - Occupational Stress Questionnaire     Feeling of Stress : Not at all   Social Connections: Moderately Isolated (2022)    Received from [facility]    Social Connection and Isolation Panel [NHANES]     Frequency of Communication with Friends and Family: Twice a week     Frequency of Social Gatherings with Friends and Family: Once a week     Attends Sikhism Services: Never     Active Member of Clubs or Organizations: No     Attends Club or Organization Meetings: Never     Marital Status:   "  Interpersonal Safety: Low Risk  (5/17/2024)    Interpersonal Safety     Do you feel physically and emotionally safe where you currently live?: Yes     Within the past 12 months, have you been hit, slapped, kicked or otherwise physically hurt by someone?: No     Within the past 12 months, have you been humiliated or emotionally abused in other ways by your partner or ex-partner?: No   Housing Stability: Low Risk  (10/19/2023)    Housing Stability     Do you have housing? : Yes     Are you worried about losing your housing?: No            Lab Results    Chemistry/lipid CBC Cardiac Enzymes/BNP/TSH/INR   Lab Results   Component Value Date    CHOL 248 (H) 05/17/2024    HDL 33 (L) 05/17/2024    TRIG 188 (H) 05/17/2024    BUN 29.4 (H) 10/31/2024     10/31/2024    CO2 24 10/31/2024    Lab Results   Component Value Date    WBC 5.6 10/31/2024    HGB 14.0 10/31/2024    HCT 43.7 10/31/2024    MCV 94 10/31/2024     10/31/2024    Lab Results   Component Value Date    TSH 3.02 11/03/2023    INR 0.99 11/26/2019          Cardiac Problems and Cardiac Diagnostics     Most Recent Cardiac testing:  ECG Personal interpretation  10/30/2024  NSR, PVCs in a pattern of bigeminy  RBBB  L axis    Stress test: 6/17/2019    The patient's exercise capacity is mildly impaired. Markedly hypertensive response noted.    The exercise nuclear stress test is negative for inducible myocardial ischemia or infarction.    The left ventricular ejection fraction is 55%.    There is no prior study available.         Assessment/Recommendations   Assessment:    Mr. Manas Martinez is a 64 year old male with a significant past history of CAD s/p RCA PALLAVI in 1999, DM2, CKD, HTN, PVCs, statin myalgias, HLD, who presents for evaluation.    PVCs   - Frequent PVCs captured on ECG.  Likely responsible for \"bradycardia\" which is a spurious reading on pulse oximeter capturing only every other heart beat   - Resume carvedilol 25mg BID   - Plan on 3 day " ziopatch to assess PVC burden   - TTE to evaluate heart structure and function, scheduled for next week.      CAD   - Reportedly 2 stents to RCA   - Evidence of CAD or stent to LAD on CT scan   - Not currently taking statin.  Has a hx of myalgias with simvastatin, atorvastatin, pravastatin, and rosuvastatin   - .  Goal LDL <70.  Recommend starting a PCSK9i, but he wants to hold off and think about it.  Will readdress next visit.    HTN   - Well controlled   - Continue current management, resuming 25mg dose of carvedilol.    RTC 6 months    The longitudinal plan of care for the diagnosis(es)/condition(s) as documented were addressed during this visit. Due to the added complexity in care, I will continue to support Manas in the subsequent management and with ongoing continuity of care.           Juan Luis Guillen DO [facility]  Non-invasive Cardiologist  [facility]

## 2024-11-13 ENCOUNTER — HOSPITAL ENCOUNTER (OUTPATIENT)
Dept: CARDIOLOGY | Facility: CLINIC | Age: 64
Discharge: HOME | End: 2024-11-13
Attending: INTERNAL MEDICINE | Admitting: INTERNAL MEDICINE
Payer: MEDICARE

## 2024-11-13 DIAGNOSIS — I49.3 PVC'S (PREMATURE VENTRICULAR CONTRACTIONS): ICD-10-CM

## 2024-11-13 PROCEDURE — 999N000208 ECHOCARDIOGRAM COMPLETE

## 2024-11-13 PROCEDURE — 255N000002 HC RX 255 OP 636: Performed by: INTERNAL MEDICINE

## 2024-11-13 PROCEDURE — 93306 TTE W/DOPPLER COMPLETE: CPT | Mod: 26 | Performed by: INTERNAL MEDICINE

## 2024-11-13 RX ADMIN — PERFLUTREN 3 ML: 6.52 INJECTION, SUSPENSION INTRAVENOUS at 13:30

## 2024-11-20 LAB — CV ZIO PRELIM RESULTS: NORMAL

## 2024-12-10 ENCOUNTER — OFFICE VISIT (OUTPATIENT)
Dept: CARDIOLOGY | Facility: CLINIC | Age: 64
End: 2024-12-10
Payer: MEDICARE

## 2024-12-10 VITALS
BODY MASS INDEX: 42.57 KG/M2 | RESPIRATION RATE: 18 BRPM | DIASTOLIC BLOOD PRESSURE: 78 MMHG | SYSTOLIC BLOOD PRESSURE: 120 MMHG | HEART RATE: 67 BPM | WEIGHT: 280 LBS

## 2024-12-10 DIAGNOSIS — I49.3 FREQUENT PVCS: Primary | ICD-10-CM

## 2024-12-10 DIAGNOSIS — I45.10 RBBB: ICD-10-CM

## 2024-12-10 LAB — MAGNESIUM SERPL-MCNC: 2.3 MG/DL (ref 1.7–2.3)

## 2024-12-10 PROCEDURE — 36415 COLL VENOUS BLD VENIPUNCTURE: CPT | Performed by: NURSE PRACTITIONER

## 2024-12-10 PROCEDURE — 83735 ASSAY OF MAGNESIUM: CPT | Performed by: NURSE PRACTITIONER

## 2024-12-10 PROCEDURE — G2211 COMPLEX E/M VISIT ADD ON: HCPCS | Performed by: NURSE PRACTITIONER

## 2024-12-10 PROCEDURE — 99214 OFFICE O/P EST MOD 30 MIN: CPT | Performed by: NURSE PRACTITIONER

## 2024-12-10 NOTE — LETTER
12/10/2024    Waldo Kerr MD  [address] [address]    RE: Manas Martinez       Dear Colleague,     I had the pleasure of seeing Manas Martinez in the [facility].        [facility] HEART CARE   [address], [place] [address]   [email or web address]   OFFICE: [phone]     Primary Care: Waldo Kerr MD  REASON FOR VISIT: PVCs       Assessment/Recommendations     Frequent PVCs: Possibly symptomatic with palpitations. Zio significant for 19% monomorphic PVCs with single 4 beat run NSVT; EF 50-55% by TTE. No symptomatic improvement after resuming carvedilol  We discussed PVC pathophysiology and treatment options including medical therapy with trial of sotalol, and catheter ablation. He is cautious to consider antiarrhythmic therapy with his CKD and would prefer to continue current medical therapy with further consideration of ablation     Plan:  Continue carvedilol 25mg twice a day  Follow-up with EP MD      History of Present Illness/Subjective    Manas Martinez is a 64 year old male, seen today for evaluation of PVCs. He has a past medical history significant for RBBB, HTN, CAD with prior PCI 1990s, dyslipidemia, T2DM, CKD, untreated KIERSTEN, obesity. He was recently evaluated for bradycardia and labile heart rates initially detected by his pulse oximeter, found to have bigeminal PVCs by EKG. An ambulatory monitor shows monomorphic PVCs approximately 19% with a single 4 beat run NSVT; TTE showed normal EF and no WMA. He has rare palpitations and dyspnea on exertion, needing more rest when splitting wood, which are unchanged since resuming carvedilol and he is unsure when these symptoms started. He denies chest discomfort, peripheral edema, paroxysmal nocturnal dyspnea, orthopnea, lightheadedness/dizziness, pre-syncope, or syncope. He jimenez in [place] December-May and is a retired .      Data Review     EKG 10/30/2024: SR with  bigeminal PVCs 72 bpm, RBBB  ms, QT/QTc 452/494 ms  11/5/2020: SB 54 bpm, RBBB  ms, QT/QTc 470/445 ms  Personally reviewed.     Zio monitoring from 11/9/2024 to 11/12/2024 (duration 3d 1h).  Predominant underlying rhythm was sinus rhythm, 55 to 100 bpm, average 72 bpm.  No sustained tachyarrhythmias.  No atrial fibrillation.  There were no pauses of greater than 3 seconds.  Rare supraventricular ectopic beats (<1%).  1 run of Supraventricular Tachycardia occurred lasting 7 beats with a max rate of 136 bpm (avg 130 bpm).  Frequent monomorphic premature ventricular contractions (19.3%).  Symptom triggers correlated with sinus rhythm with PVC in bigeminy pattern.    TTE 11/13/2024  1. Technically difficult study.  2. Normal left ventricular size and systolic performance with a visually  estimated ejection fraction of 50-55%.  3. No significant valvular heart disease is identified on this study.  4. Normal right ventricular size with low normal right ventricular systolic  performance.    I have reviewed and updated the patient's past medical history, allergy list and medication list.          Physical Examination Review of Systems   BMI= Body mass index is 42.57 kg/m .    Wt Readings from Last 3 Encounters:   12/10/24 127 kg (280 lb)   11/06/24 123.4 kg (272 lb)   10/31/24 126.3 kg (278 lb 6.4 oz)       Vitals: /78 (BP Location: Right arm, Patient Position: Sitting, Cuff Size: Adult Large)   Pulse 67   Resp 18   Wt 127 kg (280 lb)   BMI 42.57 kg/m    General   Appearance:   Alert and oriented, in no acute distress.    HEENT:  Normocephalic and atraumatic. Conjunctiva and sclera are clear. Moist oral mucosa.    Neck: No JVP, carotid bruit or obvious thyromegaly.   Lungs:   Respirations unlabored. Clear bilaterally with no rales, rhonchi, or wheezes.     Cardiovascular:   Rhythm is regularly irregular. S1 and S2 are normal. No significant murmur is present   Extremities: No cyanosis or clubbing    Skin: Skin is warm, dry, and otherwise intact.   Neurologic: Gait not assessed. Mood and affect appropriate.                                                Medical History  Surgical History Family History Social History   Past Medical History:   Diagnosis Date     Adenocarcinoma of prostate (H)      Allergic rhinitis      Cancer (H)      Coronary artery disease      Deviated septum      Elevated PSA      Hyperlipemia      Hypertension      Joint pain      Myocardial infarction (H)     Non Q wave MI, RCA     Obesity      Other asplenic status      Prediabetes      Sleep apnea      Urinary frequency     Past Surgical History:   Procedure Laterality Date     ADRENALECTOMY Right      [place]     CORONARY STENT PLACEMENT  01/01/1999    x2 RCA,     CT BIOPSY RETROPERITONEAL MASS  2019     OTHER SURGICAL HISTORY      prostrate biopsy     REPAIR TENDON BICEPS DISTAL UPPER EXTREMITY Left      REPAIR TENDON FINGER(S) Left      WISDOM TOOTH EXTRACTION       ZZC REMOVAL SPLEEN, TOTAL  1972    Description: Splenectomy;  Recorded: 2011;, accident    Family History   Problem Relation Age of Onset     Hypertension Mother      Diabetes Mother      Chronic Obstructive Pulmonary Disease Mother      Hypertension Father      Diabetes Father      Prostate Cancer Father      Pancreatic Cancer Father      Hypertension Brother      Prostate Cancer Brother      Hypertension Brother      Hypertension Brother      Diabetes Paternal Grandmother      Diabetes Paternal Grandfather     Social History     Tobacco Use     Smoking status: Former     Current packs/day: 0.00     Average packs/day: 1 pack/day for 25.0 years (25.0 ttl pk-yrs)     Types: Cigarettes     Start date: 1974     Quit date: 1999     Years since quittin.9     Smokeless tobacco: Never   Vaping Use     Vaping status: Never Used   Substance Use Topics     Alcohol use: Yes     Alcohol/week: 10.0 standard drinks of  "alcohol     Drug use: No          Medications  Allergies   Current Outpatient Medications   Medication Sig Dispense Refill     allopurinol (ZYLOPRIM) 100 MG tablet Take 1 tablet (100 mg) by mouth daily. 90 tablet 4     aspirin (ASA) 81 MG EC tablet Take 1 tablet (81 mg) by mouth daily       carvedilol (COREG) 25 MG tablet Take 1 tablet (25 mg) by mouth 2 times daily (with meals) 180 tablet 2     empagliflozin (JARDIANCE) 10 MG TABS tablet Take 1 tablet (10 mg) by mouth daily 90 tablet 1     leuprolide (LUPRON DEPOT) 22.5 MG kit Inject 22.5 mg into the muscle every 3 months       losartan (COZAAR) 100 MG tablet Take 1 tablet (100 mg) by mouth daily 90 tablet 4     Multiple Vitamins-Minerals (MULTIVITAMIN ADULT, MINERALS, PO) Take 1 tablet by mouth daily       Semaglutide, 2 MG/DOSE, (OZEMPIC) 8 MG/3ML pen Inject 2 mg Subcutaneous every 7 days 9 mL 3     rosuvastatin (CRESTOR) 5 MG tablet Take 1 tablet (5 mg) by mouth daily (Patient not taking: Reported on 12/10/2024) 90 tablet 4    Allergies   Allergen Reactions     Atorvastatin Other (See Comments)     Leg Cramps         Lab Results    Chemistry/lipid CBC Cardiac Enzymes/BNP/TSH/INR   Lab Results   Component Value Date    BUN 29.4 (H) 10/31/2024     10/31/2024    CO2 24 10/31/2024     No results found for: \"CREATININE\"    Lab Results   Component Value Date    CHOL 248 (H) 05/17/2024    HDL 33 (L) 05/17/2024     (H) 05/17/2024      Lab Results   Component Value Date    WBC 5.6 10/31/2024    HGB 14.0 10/31/2024    HCT 43.7 10/31/2024    MCV 94 10/31/2024     10/31/2024    Lab Results   Component Value Date    TSH 3.02 11/03/2023    INR 0.99 11/26/2019        This note has been dictated using voice recognition software. Any grammatical, typographical, or context distortions are unintentional and inherent to the software.    Amy Sotelo, CNP  Clinical Cardiac Electrophysiology  [address]  " [place] [address]   Office: [phone]  Fax: [phone]                                 Thank you for allowing me to participate in the care of your patient.      Sincerely,     KAREN [facility]  cc:   No referring provider defined for this encounter.

## 2024-12-10 NOTE — Clinical Note
Saw this gentleman for PVCs and he is likely going to pursue ablation - do you think prior ischemic evaluation warranted? Sounds like sx not consistent with prior MI, negative MPI 2019, and unremarkable TTE recently but figured I would ask in light of new PVCs and prior PCI. Thank you!

## 2024-12-10 NOTE — PROGRESS NOTES
[facility] HEART [facility]   [address], [place] [address]   [email or web address]   OFFICE: [phone]     Primary Care: Waldo Kerr MD  REASON FOR VISIT: PVCs       Assessment/Recommendations     Frequent PVCs: Possibly symptomatic with palpitations. Zio significant for 19% monomorphic PVCs with single 4 beat run NSVT; EF 50-55% by TTE. No symptomatic improvement after resuming carvedilol  We discussed PVC pathophysiology and treatment options including medical therapy with trial of sotalol, and catheter ablation. He is cautious to consider antiarrhythmic therapy with his CKD and would prefer to continue current medical therapy with further consideration of ablation     Plan:  Continue carvedilol 25mg twice a day  Follow-up with EP MD      History of Present Illness/Subjective    Manas Martinez is a 64 year old male, seen today for evaluation of PVCs. He has a past medical history significant for RBBB, HTN, CAD with prior PCI 1990s, dyslipidemia, T2DM, CKD, untreated KIERSTEN, obesity. He was recently evaluated for bradycardia and labile heart rates initially detected by his pulse oximeter, found to have bigeminal PVCs by EKG. An ambulatory monitor shows monomorphic PVCs approximately 19% with a single 4 beat run NSVT; TTE showed normal EF and no WMA. He has rare palpitations and dyspnea on exertion, needing more rest when splitting wood, which are unchanged since resuming carvedilol and he is unsure when these symptoms started. He denies chest discomfort, peripheral edema, paroxysmal nocturnal dyspnea, orthopnea, lightheadedness/dizziness, pre-syncope, or syncope. He jimenez in [place] December-May and is a retired .      Data Review     EKG 10/30/2024: SR with bigeminal PVCs 72 bpm, RBBB  ms, QT/QTc 452/494 ms  11/5/2020: SB 54 bpm, RBBB  ms, QT/QTc 470/445 ms  Personally reviewed.     Zio monitoring from 11/9/2024 to 11/12/2024 (duration 3d  1h).  Predominant underlying rhythm was sinus rhythm, 55 to 100 bpm, average 72 bpm.  No sustained tachyarrhythmias.  No atrial fibrillation.  There were no pauses of greater than 3 seconds.  Rare supraventricular ectopic beats (<1%).  1 run of Supraventricular Tachycardia occurred lasting 7 beats with a max rate of 136 bpm (avg 130 bpm).  Frequent monomorphic premature ventricular contractions (19.3%).  Symptom triggers correlated with sinus rhythm with PVC in bigeminy pattern.    TTE 11/13/2024  1. Technically difficult study.  2. Normal left ventricular size and systolic performance with a visually  estimated ejection fraction of 50-55%.  3. No significant valvular heart disease is identified on this study.  4. Normal right ventricular size with low normal right ventricular systolic  performance.    I have reviewed and updated the patient's past medical history, allergy list and medication list.          Physical Examination Review of Systems   BMI= Body mass index is 42.57 kg/m .    Wt Readings from Last 3 Encounters:   12/10/24 127 kg (280 lb)   11/06/24 123.4 kg (272 lb)   10/31/24 126.3 kg (278 lb 6.4 oz)       Vitals: /78 (BP Location: Right arm, Patient Position: Sitting, Cuff Size: Adult Large)   Pulse 67   Resp 18   Wt 127 kg (280 lb)   BMI 42.57 kg/m    General   Appearance:   Alert and oriented, in no acute distress.    HEENT:  Normocephalic and atraumatic. Conjunctiva and sclera are clear. Moist oral mucosa.    Neck: No JVP, carotid bruit or obvious thyromegaly.   Lungs:   Respirations unlabored. Clear bilaterally with no rales, rhonchi, or wheezes.     Cardiovascular:   Rhythm is regularly irregular. S1 and S2 are normal. No significant murmur is present   Extremities: No cyanosis or clubbing   Skin: Skin is warm, dry, and otherwise intact.   Neurologic: Gait not assessed. Mood and affect appropriate.                                                Medical History  Surgical History Family  History Social History   Past Medical History:   Diagnosis Date    Adenocarcinoma of prostate (H)     Allergic rhinitis     Cancer (H)     Coronary artery disease     Deviated septum     Elevated PSA     Hyperlipemia     Hypertension     Joint pain     Myocardial infarction (H)     Non Q wave MI, RCA    Obesity     Other asplenic status     Prediabetes     Sleep apnea     Urinary frequency     Past Surgical History:   Procedure Laterality Date    ADRENALECTOMY Right     [place]    CORONARY STENT PLACEMENT  01/01/1999    x2 RCA,    CT BIOPSY RETROPERITONEAL MASS  2019    OTHER SURGICAL HISTORY      prostrate biopsy    REPAIR TENDON BICEPS DISTAL UPPER EXTREMITY Left     REPAIR TENDON FINGER(S) Left     WISDOM TOOTH EXTRACTION      ZZC REMOVAL SPLEEN, TOTAL  1972    Description: Splenectomy;  Recorded: 2011;, accident    Family History   Problem Relation Age of Onset    Hypertension Mother     Diabetes Mother     Chronic Obstructive Pulmonary Disease Mother     Hypertension Father     Diabetes Father     Prostate Cancer Father     Pancreatic Cancer Father     Hypertension Brother     Prostate Cancer Brother     Hypertension Brother     Hypertension Brother     Diabetes Paternal Grandmother     Diabetes Paternal Grandfather     Social History     Tobacco Use    Smoking status: Former     Current packs/day: 0.00     Average packs/day: 1 pack/day for 25.0 years (25.0 ttl pk-yrs)     Types: Cigarettes     Start date: 1974     Quit date: 1999     Years since quittin.9    Smokeless tobacco: Never   Vaping Use    Vaping status: Never Used   Substance Use Topics    Alcohol use: Yes     Alcohol/week: 10.0 standard drinks of alcohol    Drug use: No          Medications  Allergies   Current Outpatient Medications   Medication Sig Dispense Refill    allopurinol (ZYLOPRIM) 100 MG tablet Take 1 tablet (100 mg) by mouth daily. 90 tablet 4    aspirin (ASA) 81 MG EC  "tablet Take 1 tablet (81 mg) by mouth daily      carvedilol (COREG) 25 MG tablet Take 1 tablet (25 mg) by mouth 2 times daily (with meals) 180 tablet 2    empagliflozin (JARDIANCE) 10 MG TABS tablet Take 1 tablet (10 mg) by mouth daily 90 tablet 1    leuprolide (LUPRON DEPOT) 22.5 MG kit Inject 22.5 mg into the muscle every 3 months      losartan (COZAAR) 100 MG tablet Take 1 tablet (100 mg) by mouth daily 90 tablet 4    Multiple Vitamins-Minerals (MULTIVITAMIN ADULT, MINERALS, PO) Take 1 tablet by mouth daily      Semaglutide, 2 MG/DOSE, (OZEMPIC) 8 MG/3ML pen Inject 2 mg Subcutaneous every 7 days 9 mL 3    rosuvastatin (CRESTOR) 5 MG tablet Take 1 tablet (5 mg) by mouth daily (Patient not taking: Reported on 12/10/2024) 90 tablet 4    Allergies   Allergen Reactions    Atorvastatin Other (See Comments)     Leg Cramps         Lab Results    Chemistry/lipid CBC Cardiac Enzymes/BNP/TSH/INR   Lab Results   Component Value Date    BUN 29.4 (H) 10/31/2024     10/31/2024    CO2 24 10/31/2024     No results found for: \"CREATININE\"    Lab Results   Component Value Date    CHOL 248 (H) 05/17/2024    HDL 33 (L) 05/17/2024     (H) 05/17/2024      Lab Results   Component Value Date    WBC 5.6 10/31/2024    HGB 14.0 10/31/2024    HCT 43.7 10/31/2024    MCV 94 10/31/2024     10/31/2024    Lab Results   Component Value Date    TSH 3.02 11/03/2023    INR 0.99 11/26/2019        This note has been dictated using voice recognition software. Any grammatical, typographical, or context distortions are unintentional and inherent to the software.    Amy Sotelo, CNP  Clinical Cardiac Electrophysiology  [facility]  [address]  [place] [address]   Office: [phone]  Fax: [phone]                             "

## 2024-12-12 ENCOUNTER — TELEPHONE (OUTPATIENT)
Dept: CARDIOLOGY | Facility: CLINIC | Age: 64
End: 2024-12-12
Payer: MEDICARE

## 2024-12-12 DIAGNOSIS — I25.10 CORONARY ARTERY DISEASE INVOLVING NATIVE CORONARY ARTERY OF NATIVE HEART WITHOUT ANGINA PECTORIS: ICD-10-CM

## 2024-12-12 DIAGNOSIS — I49.3 FREQUENT PVCS: Primary | ICD-10-CM

## 2024-12-12 NOTE — TELEPHONE ENCOUNTER
PC back from pt, corresponding information/recommendations reviewed, verbalized understanding, has no further questions at this time, contact information was given for further concerns/questions, scheduling notified to contact pt, and order(s) were placed   12/12/2024 2:01 PM  Rosita Parmar RN

## 2024-12-12 NOTE — TELEPHONE ENCOUNTER
Amy Sotelo APRN CNP  [facility] Ep Support Pool - e  See below - can you please connect with Manas regarding updated recs for MPI? Thank you          Previous Messages       ----- Message -----  From: Juan Luis Guillen DO  Sent: 12/11/2024   1:25 PM CST  To: KAREN Houston CNP    I agree his symptoms are more likely related to the PVCs and they are monomorphic.   That being said, I think it would be reasonable to get another NM MPI just to make sure nothing is being missed.      TS  ----- Message -----  From: Amy Sotelo APRN CNP  Sent: 12/10/2024  10:47 AM CST  To: Juan Luis Guillen,     Saw this gentleman for PVCs and he is likely going to pursue ablation - do you think prior ischemic evaluation warranted? Sounds like sx not consistent with prior MI, negative MPI 2019, and unremarkable TTE recently but figured I would ask in light of new PVCs and prior PCI. Thank you!

## 2024-12-17 ENCOUNTER — HOSPITAL ENCOUNTER (OUTPATIENT)
Dept: NUCLEAR MEDICINE | Facility: HOSPITAL | Age: 64
Discharge: HOME | End: 2024-12-17
Attending: NURSE PRACTITIONER
Payer: MEDICARE

## 2024-12-17 ENCOUNTER — HOSPITAL ENCOUNTER (OUTPATIENT)
Dept: CARDIOLOGY | Facility: HOSPITAL | Age: 64
Discharge: HOME | End: 2024-12-17
Attending: NURSE PRACTITIONER
Payer: MEDICARE

## 2024-12-17 DIAGNOSIS — I49.3 FREQUENT PVCS: ICD-10-CM

## 2024-12-17 DIAGNOSIS — I25.10 CORONARY ARTERY DISEASE INVOLVING NATIVE CORONARY ARTERY OF NATIVE HEART WITHOUT ANGINA PECTORIS: ICD-10-CM

## 2024-12-17 LAB
CV STRESS CURRENT BP HE: NORMAL
CV STRESS CURRENT HR HE: 64
CV STRESS CURRENT HR HE: 64
CV STRESS CURRENT HR HE: 65
CV STRESS CURRENT HR HE: 75
CV STRESS CURRENT HR HE: 76
CV STRESS CURRENT HR HE: 77
CV STRESS CURRENT HR HE: 80
CV STRESS CURRENT HR HE: 81
CV STRESS CURRENT HR HE: 82
CV STRESS CURRENT HR HE: 82
CV STRESS CURRENT HR HE: 83
CV STRESS CURRENT HR HE: 84
CV STRESS DEVIATION TIME HE: NORMAL
CV STRESS ECHO PERCENT HR HE: NORMAL
CV STRESS EXERCISE STAGE HE: NORMAL
CV STRESS FINAL RESTING BP HE: NORMAL
CV STRESS FINAL RESTING HR HE: 76
CV STRESS MAX HR HE: 84
CV STRESS MAX TREADMILL GRADE HE: 0
CV STRESS MAX TREADMILL SPEED HE: 0
CV STRESS PEAK DIA BP HE: NORMAL
CV STRESS PEAK SYS BP HE: NORMAL
CV STRESS PHASE HE: NORMAL
CV STRESS PROTOCOL HE: NORMAL
CV STRESS RESTING PT POSITION HE: NORMAL
CV STRESS ST DEVIATION AMOUNT HE: NORMAL
CV STRESS ST DEVIATION ELEVATION HE: NORMAL
CV STRESS ST EVELATION AMOUNT HE: NORMAL
CV STRESS TEST TYPE HE: NORMAL
CV STRESS TOTAL STAGE TIME MIN 1 HE: NORMAL
NUC STRESS EJECTION FRACTION: 68 %
RATE PRESSURE PRODUCT: NORMAL
STRESS ECHO BASELINE DIASTOLIC HE: 95
STRESS ECHO BASELINE HR: 63
STRESS ECHO BASELINE SYSTOLIC BP: 151
STRESS ECHO CALCULATED PERCENT HR: 55 %
STRESS ECHO LAST STRESS DIASTOLIC BP: 90
STRESS ECHO LAST STRESS HR: 82
STRESS ECHO LAST STRESS SYSTOLIC BP: 161
STRESS ECHO TARGET HR: 154

## 2024-12-17 PROCEDURE — 343N000001 HC RX 343 MED OP 636: Performed by: NURSE PRACTITIONER

## 2024-12-17 PROCEDURE — 93018 CV STRESS TEST I&R ONLY: CPT | Performed by: INTERNAL MEDICINE

## 2024-12-17 PROCEDURE — 93017 CV STRESS TEST TRACING ONLY: CPT

## 2024-12-17 PROCEDURE — 78452 HT MUSCLE IMAGE SPECT MULT: CPT | Mod: ME

## 2024-12-17 PROCEDURE — G1010 CDSM STANSON: HCPCS | Performed by: INTERNAL MEDICINE

## 2024-12-17 PROCEDURE — 93016 CV STRESS TEST SUPVJ ONLY: CPT | Performed by: INTERNAL MEDICINE

## 2024-12-17 PROCEDURE — A9500 TC99M SESTAMIBI: HCPCS | Performed by: NURSE PRACTITIONER

## 2024-12-17 PROCEDURE — G1010 CDSM STANSON: HCPCS

## 2024-12-17 PROCEDURE — 250N000011 HC RX IP 250 OP 636: Performed by: NURSE PRACTITIONER

## 2024-12-17 PROCEDURE — 78452 HT MUSCLE IMAGE SPECT MULT: CPT | Mod: 26 | Performed by: INTERNAL MEDICINE

## 2024-12-17 RX ORDER — AMINOPHYLLINE 25 MG/ML
50-100 INJECTION, SOLUTION INTRAVENOUS
Status: DISCONTINUED | OUTPATIENT
Start: 2024-12-17 | End: 2024-12-18 | Stop reason: HOSPADM

## 2024-12-17 RX ORDER — ALBUTEROL SULFATE 0.83 MG/ML
2.5 SOLUTION RESPIRATORY (INHALATION)
Status: DISCONTINUED | OUTPATIENT
Start: 2024-12-17 | End: 2024-12-17 | Stop reason: HOSPADM

## 2024-12-17 RX ORDER — REGADENOSON 0.08 MG/ML
0.4 INJECTION, SOLUTION INTRAVENOUS ONCE
Status: COMPLETED | OUTPATIENT
Start: 2024-12-17 | End: 2024-12-17

## 2024-12-17 RX ORDER — CAFFEINE CITRATE 20 MG/ML
60 SOLUTION INTRAVENOUS
Status: DISCONTINUED | OUTPATIENT
Start: 2024-12-17 | End: 2024-12-17 | Stop reason: HOSPADM

## 2024-12-17 RX ORDER — CAFFEINE 200 MG
200 TABLET ORAL
Status: DISCONTINUED | OUTPATIENT
Start: 2024-12-17 | End: 2024-12-17 | Stop reason: HOSPADM

## 2024-12-17 RX ADMIN — Medication 10.8 MILLICURIE: at 07:47

## 2024-12-17 RX ADMIN — REGADENOSON 0.4 MG: 0.08 INJECTION, SOLUTION INTRAVENOUS at 08:35

## 2024-12-17 RX ADMIN — Medication 40 MILLICURIE: at 09:17

## 2025-01-17 DIAGNOSIS — E66.01 SEVERE OBESITY (H): ICD-10-CM

## 2025-01-17 DIAGNOSIS — E11.29 TYPE 2 DIABETES MELLITUS WITH MICROALBUMINURIA, WITHOUT LONG-TERM CURRENT USE OF INSULIN (H): ICD-10-CM

## 2025-01-17 DIAGNOSIS — R80.9 TYPE 2 DIABETES MELLITUS WITH MICROALBUMINURIA, WITHOUT LONG-TERM CURRENT USE OF INSULIN (H): ICD-10-CM

## 2025-01-21 ENCOUNTER — OFFICE VISIT (OUTPATIENT)
Dept: CARDIOLOGY | Facility: CLINIC | Age: 65
End: 2025-01-21
Attending: NURSE PRACTITIONER
Payer: MEDICARE

## 2025-01-21 VITALS
BODY MASS INDEX: 42.73 KG/M2 | RESPIRATION RATE: 14 BRPM | SYSTOLIC BLOOD PRESSURE: 128 MMHG | DIASTOLIC BLOOD PRESSURE: 87 MMHG | WEIGHT: 281 LBS | HEART RATE: 71 BPM

## 2025-01-21 DIAGNOSIS — I49.3 FREQUENT PVCS: Primary | ICD-10-CM

## 2025-01-21 PROCEDURE — 99205 OFFICE O/P NEW HI 60 MIN: CPT | Performed by: INTERNAL MEDICINE

## 2025-01-21 NOTE — PROGRESS NOTES
[facility]  Cardiac Electrophysiology  [address]  [place] [address]   Office: [phone]  Fax: [phone]     Cardiac Electrophysiology Consultation    Patient: Manas Martinez   : 1960     Referring Provider: Amy Sotelo CNP  Primary Care Provider: Waldo Kerr MD  Primary Cardiologist: Uriah Guillen MD    CHIEF COMPLAINT/REASON FOR CONSULTATION  Frequent PVCs    Assessment/Recommendations     Frequent PVCs:  The patient reports no direct awareness of his ectopy.  Patient's recent Zio patch monitor demonstrates a 19.3% burden of PVCs (monomorphic) and his twelve-lead EKG demonstrates monomorphic PVCs arising from the LVOT.  We discussed the underlying pathophysiology and natural history of his arrhythmia.  We also discussed treatment options including close follow-up, medical therapy, and mapping/ablation.  The patient favors a repeat assessment of his PVC burden before making any decisions about potential treatment strategies.    Coronary atherosclerosis, native vessel: Chronic problem for the patient who is undergone previous PCI in the .  Recent nuclear stress imaging study demonstrated a small reversible defect in the anterior/apical segments.  The patient has known dyslipidemia and is followed by Dr. Guillen for this problem.  The patient reports that with climbing stairs and heavier work he will develop quite heavy breathing but no chest pain or pressure..    Essential hypertension: Chronic problem for the patient and his blood pressure today is at target on his current medical therapy.      Follow up/recommendations:   Patient will be scheduled to wear a follow-up 3-day Zio patch monitor in March.  Follow-up in the arrhythmia clinic with EP SHRUTHI (Amy Sotelo CNP) in May.  I will query Dr. Guillen regarding potential additional testing given the.  Patient's exercise-induced symptoms and his nuclear stress imaging study findings    Time spent: 60  "minutes spent on the date of the encounter doing chart review, history and exam, documentation and further activities as noted above.       History of Present Illness   Manas Martinez is a 65 year old male with hypertension, ASCVD, KIERSTEN, diabetes, and frequent PVCs referred by Amy Sotelo CNP for consultation regarding treatment options for frequent PVCs.  Patient reports that he is not aware of any palpitations, skipped beats, sustained tachypalpitations, lightheadedness presyncope or syncope.  He notes that with heavier exertion he will develop \"gurgling\" breathing which resolves with rest.  He does not have any chest pain or pressure associated with that sensation.  He describes no orthopnea, PND or ankle edema.       Physical Examination  Review of Systems   VITALS: /87 (BP Location: Right arm, Patient Position: Sitting, Cuff Size: Adult Large)   Pulse 71   Resp 14   Wt 127.5 kg (281 lb)   BMI 42.73 kg/m      Wt Readings from Last 3 Encounters:   12/10/24 127 kg (280 lb)   11/06/24 123.4 kg (272 lb)   10/31/24 126.3 kg (278 lb 6.4 oz)     CONSTITUTIONAL: well nourished, comfortable, no distress  EYES:  Conjunctivae pink, sclerae clear.    E/N/T:  Oral mucosa pink  RESPIRATORY:  Respiratory effort is normal  CARDIOVASCULAR: RRR with frequent ectopic beats and normal S1 and S2  GASTROINTESTINAL:  Abdomen without masses or tenderness  EXTREMITIES:  No clubbing or cyanosis.    MUSCULOSKELETAL:  Overall grossly normal muscle strength  SKIN:  Overall, skin warm and dry, no lesions.  NEURO/PSYCH:  Oriented x 3 with normal affect.   Constitutional:  No weight loss or loss of appetite    Eyes:  No difficulty with vision, no double vision, no dry eyes  ENT:  No sore throat, difficulty swallowing; changes in hearing or tinnitus  Cardiovascular: As detailed above  Respiratory:  No cough  Musculoskeletal  No joint pain, muscle aches  Neurologic:  No syncope, lightheadedness, fainting spells   Hematologic: No " easy bruising, excessive bleeding tendency   Gastrointestinal:  No jaundice, abdominal pain or abdominal bloating  Genitourinary: No changes in urinary habits, no trouble urinating    Psychiatric: No anxiety or depression      Medical History  Surgical History   Past Medical History:   Diagnosis Date    Adenocarcinoma of prostate (H)     Allergic rhinitis     Cancer (H)     Coronary artery disease     Deviated septum     Elevated PSA     Hyperlipemia     Hypertension     Joint pain     Myocardial infarction (H)     Non Q wave MI, RCA    Obesity     Other asplenic status     Prediabetes     Sleep apnea     Urinary frequency     Past Surgical History:   Procedure Laterality Date    ADRENALECTOMY Right     [place]    CORONARY STENT PLACEMENT  01/01/1999    x2 RCA,    CT BIOPSY RETROPERITONEAL MASS  2019    OTHER SURGICAL HISTORY      prostrate biopsy    REPAIR TENDON BICEPS DISTAL UPPER EXTREMITY Left     REPAIR TENDON FINGER(S) Left     WISDOM TOOTH EXTRACTION      ZZC REMOVAL SPLEEN, TOTAL  1972    Description: Splenectomy;  Recorded: 2011;, accident         Family History Social History   Family History   Problem Relation Age of Onset    Hypertension Mother     Diabetes Mother     Chronic Obstructive Pulmonary Disease Mother     Hypertension Father     Diabetes Father     Prostate Cancer Father     Pancreatic Cancer Father     Hypertension Brother     Prostate Cancer Brother     Hypertension Brother     Hypertension Brother     Diabetes Paternal Grandmother     Diabetes Paternal Grandfather         Social History     Tobacco Use    Smoking status: Former     Current packs/day: 0.00     Average packs/day: 1 pack/day for 25.0 years (25.0 ttl pk-yrs)     Types: Cigarettes     Start date: 1974     Quit date: 1999     Years since quittin.0    Smokeless tobacco: Never   Vaping Use    Vaping status: Never Used   Substance Use Topics    Alcohol use: Yes     " Alcohol/week: 10.0 standard drinks of alcohol    Drug use: No         Medications  Allergies     Current Outpatient Medications:     allopurinol (ZYLOPRIM) 100 MG tablet, Take 1 tablet (100 mg) by mouth daily., Disp: 90 tablet, Rfl: 4    aspirin (ASA) 81 MG EC tablet, Take 1 tablet (81 mg) by mouth daily, Disp: , Rfl:     carvedilol (COREG) 25 MG tablet, Take 1 tablet (25 mg) by mouth 2 times daily (with meals), Disp: 180 tablet, Rfl: 2    empagliflozin (JARDIANCE) 10 MG TABS tablet, Take 1 tablet (10 mg) by mouth daily, Disp: 90 tablet, Rfl: 1    leuprolide (LUPRON DEPOT) 22.5 MG kit, Inject 22.5 mg into the muscle every 3 months, Disp: , Rfl:     losartan (COZAAR) 100 MG tablet, Take 1 tablet (100 mg) by mouth daily, Disp: 90 tablet, Rfl: 4    Multiple Vitamins-Minerals (MULTIVITAMIN ADULT, MINERALS, PO), Take 1 tablet by mouth daily, Disp: , Rfl:     rosuvastatin (CRESTOR) 5 MG tablet, Take 1 tablet (5 mg) by mouth daily (Patient not taking: Reported on 12/10/2024), Disp: 90 tablet, Rfl: 4    Semaglutide, 2 MG/DOSE, (OZEMPIC) 8 MG/3ML pen, Inject 2 mg subcutaneously every 7 days., Disp: 9 mL, Rfl: 3     Allergies   Allergen Reactions    Atorvastatin Other (See Comments)     Leg Cramps          Lab Results    Chemistry CBC Cardiac Enzymes/BNP/TSH/INR   Recent Labs   Lab Test 10/31/24  1328      POTASSIUM 4.5   CHLORIDE 102   CO2 24   GLC 99   BUN 29.4*   CR 1.65*   GFRESTIMATED 46*   TATIANNA 10.1     Recent Labs   Lab Test 10/31/24  1328 07/12/24  1121 05/17/24  0753   CR 1.65* 1.42* 1.66*          Recent Labs   Lab Test 10/31/24  1328   WBC 5.6   HGB 14.0   HCT 43.7   MCV 94        Recent Labs   Lab Test 10/31/24  1328 05/17/24  0753 05/05/23  0758   HGB 14.0 13.3 13.9    No results for input(s): \"TROPONINI\" in the last 79402 hours.  No results for input(s): \"BNP\", \"NTBNPI\", \"NTBNP\" in the last 47272 hours.  Recent Labs   Lab Test 11/03/23  1008   TSH 3.02     Recent Labs   Lab Test 11/26/19  0738   INR " 0.99         Data Review    ECGs dated tracing dated 10/30/2024  (tracings independently reviewed)      Zio patch monitoring dated 11/19/2024   (independently reviewed)  Zio monitoring from 11/9/2024 to 11/12/2024 (duration 3d 1h).  Predominant underlying rhythm was sinus rhythm, 55 to 100 bpm, average 72 bpm.  No sustained tachyarrhythmias.  No atrial fibrillation.  There were no pauses of greater than 3 seconds.  Rare supraventricular ectopic beats (<1%).  1 run of Supraventricular Tachycardia occurred lasting 7 beats with a max rate of 136 bpm (avg 130 bpm).  Frequent monomorphic premature ventricular contractions (19.3%).  Symptom triggers correlated with sinus rhythm with PVC in bigeminy pattern.    Echocardiogram (TTE) dated 11/13/2024  1. Technically difficult study.  2. Normal left ventricular size and systolic performance with a visually  estimated ejection fraction of 50-55%.  3. No significant valvular heart disease is identified on this study.  4. Normal right ventricular size with low normal right ventricular systolic  performance.    Nuclear stress imaging study 12/17/2024    1.The nuclear stress test is abnormal.    2.Negative pharmacological regadenoson ECG for ischemia.    3.There is a small area of mild ischemia in the distal anterior and apical segment(s) of the left ventricle.  No scar seen.    4.The left ventricular ejection fraction at stress is 68%.    5.The patient is at a low risk of future cardiac ischemic events.    A prior study was conducted on 6/17/2019.  The area of distal anterior and apical ischemia is new.       Cc:

## 2025-01-21 NOTE — LETTER
2025    Waldo Kerr MD  [address] [facility] [address] [place] [address]    RE: Manas Martinez       Dear Colleague,     I had the pleasure of seeing Manas Martinez in the [facility].     [facility]  Cardiac Electrophysiology  [address]  [place] [address]   Office: [phone]  Fax: [phone]     Cardiac Electrophysiology Consultation    Patient: Manas Martinez   : 1960     Referring Provider: Amy Sotelo CNP  Primary Care Provider: Waldo Kerr MD  Primary Cardiologist: Uriah Guillen MD    CHIEF COMPLAINT/REASON FOR CONSULTATION  Frequent PVCs    Assessment/Recommendations     Frequent PVCs:  The patient reports no direct awareness of his ectopy.  Patient's recent Zio patch monitor demonstrates a 19.3% burden of PVCs (monomorphic) and his twelve-lead EKG demonstrates monomorphic PVCs arising from the LVOT.  We discussed the underlying pathophysiology and natural history of his arrhythmia.  We also discussed treatment options including close follow-up, medical therapy, and mapping/ablation.  The patient favors a repeat assessment of his PVC burden before making any decisions about potential treatment strategies.    Coronary atherosclerosis, native vessel: Chronic problem for the patient who is undergone previous PCI in the .  Recent nuclear stress imaging study demonstrated a small reversible defect in the anterior/apical segments.  The patient has known dyslipidemia and is followed by Dr. Guillen for this problem.  The patient reports that with climbing stairs and heavier work he will develop quite heavy breathing but no chest pain or pressure..    Essential hypertension: Chronic problem for the patient and his blood pressure today is at target on his current medical therapy.      Follow up/recommendations:   Patient will be scheduled to wear a follow-up 3-day Zio patch monitor in March.  Follow-up in the arrhythmia clinic with EP SHRUTHI  "(Amy Sotelo CNP) in May.  I will query Dr. Guillen regarding potential additional testing given the.  Patient's exercise-induced symptoms and his nuclear stress imaging study findings    Time spent: 60 minutes spent on the date of the encounter doing chart review, history and exam, documentation and further activities as noted above.       History of Present Illness   Manas Martinez is a 65 year old male with hypertension, ASCVD, KIERSTEN, diabetes, and frequent PVCs referred by Amy Sotelo CNP for consultation regarding treatment options for frequent PVCs.  Patient reports that he is not aware of any palpitations, skipped beats, sustained tachypalpitations, lightheadedness presyncope or syncope.  He notes that with heavier exertion he will develop \"gurgling\" breathing which resolves with rest.  He does not have any chest pain or pressure associated with that sensation.  He describes no orthopnea, PND or ankle edema.       Physical Examination  Review of Systems   VITALS: /87 (BP Location: Right arm, Patient Position: Sitting, Cuff Size: Adult Large)   Pulse 71   Resp 14   Wt 127.5 kg (281 lb)   BMI 42.73 kg/m      Wt Readings from Last 3 Encounters:   12/10/24 127 kg (280 lb)   11/06/24 123.4 kg (272 lb)   10/31/24 126.3 kg (278 lb 6.4 oz)     CONSTITUTIONAL: well nourished, comfortable, no distress  EYES:  Conjunctivae pink, sclerae clear.    E/N/T:  Oral mucosa pink  RESPIRATORY:  Respiratory effort is normal  CARDIOVASCULAR: RRR with frequent ectopic beats and normal S1 and S2  GASTROINTESTINAL:  Abdomen without masses or tenderness  EXTREMITIES:  No clubbing or cyanosis.    MUSCULOSKELETAL:  Overall grossly normal muscle strength  SKIN:  Overall, skin warm and dry, no lesions.  NEURO/PSYCH:  Oriented x 3 with normal affect.   Constitutional:  No weight loss or loss of appetite    Eyes:  No difficulty with vision, no double vision, no dry eyes  ENT:  No sore throat, difficulty swallowing; changes in " hearing or tinnitus  Cardiovascular: As detailed above  Respiratory:  No cough  Musculoskeletal  No joint pain, muscle aches  Neurologic:  No syncope, lightheadedness, fainting spells   Hematologic: No easy bruising, excessive bleeding tendency   Gastrointestinal:  No jaundice, abdominal pain or abdominal bloating  Genitourinary: No changes in urinary habits, no trouble urinating    Psychiatric: No anxiety or depression      Medical History  Surgical History   Past Medical History:   Diagnosis Date     Adenocarcinoma of prostate (H)      Allergic rhinitis      Cancer (H)      Coronary artery disease      Deviated septum      Elevated PSA      Hyperlipemia      Hypertension      Joint pain      Myocardial infarction (H) 1999    Non Q wave MI, RCA     Obesity      Other asplenic status      Prediabetes      Sleep apnea      Urinary frequency     Past Surgical History:   Procedure Laterality Date     ADRENALECTOMY Right      [place]     CORONARY STENT PLACEMENT  01/01/1999    x2 RCA,     CT BIOPSY RETROPERITONEAL MASS  12/04/2019     OTHER SURGICAL HISTORY      prostrate biopsy     REPAIR TENDON BICEPS DISTAL UPPER EXTREMITY Left      REPAIR TENDON FINGER(S) Left      WISDOM TOOTH EXTRACTION       ZZC REMOVAL SPLEEN, TOTAL  01/01/1972    Description: Splenectomy;  Recorded: 11/16/2011;, accident         Family History Social History   Family History   Problem Relation Age of Onset     Hypertension Mother      Diabetes Mother      Chronic Obstructive Pulmonary Disease Mother      Hypertension Father      Diabetes Father      Prostate Cancer Father      Pancreatic Cancer Father      Hypertension Brother      Prostate Cancer Brother      Hypertension Brother      Hypertension Brother      Diabetes Paternal Grandmother      Diabetes Paternal Grandfather         Social History     Tobacco Use     Smoking status: Former     Current packs/day: 0.00     Average packs/day: 1 pack/day for 25.0  years (25.0 ttl pk-yrs)     Types: Cigarettes     Start date: 1974     Quit date: 1999     Years since quittin.0     Smokeless tobacco: Never   Vaping Use     Vaping status: Never Used   Substance Use Topics     Alcohol use: Yes     Alcohol/week: 10.0 standard drinks of alcohol     Drug use: No         Medications  Allergies     Current Outpatient Medications:      allopurinol (ZYLOPRIM) 100 MG tablet, Take 1 tablet (100 mg) by mouth daily., Disp: 90 tablet, Rfl: 4     aspirin (ASA) 81 MG EC tablet, Take 1 tablet (81 mg) by mouth daily, Disp: , Rfl:      carvedilol (COREG) 25 MG tablet, Take 1 tablet (25 mg) by mouth 2 times daily (with meals), Disp: 180 tablet, Rfl: 2     empagliflozin (JARDIANCE) 10 MG TABS tablet, Take 1 tablet (10 mg) by mouth daily, Disp: 90 tablet, Rfl: 1     leuprolide (LUPRON DEPOT) 22.5 MG kit, Inject 22.5 mg into the muscle every 3 months, Disp: , Rfl:      losartan (COZAAR) 100 MG tablet, Take 1 tablet (100 mg) by mouth daily, Disp: 90 tablet, Rfl: 4     Multiple Vitamins-Minerals (MULTIVITAMIN ADULT, MINERALS, PO), Take 1 tablet by mouth daily, Disp: , Rfl:      rosuvastatin (CRESTOR) 5 MG tablet, Take 1 tablet (5 mg) by mouth daily (Patient not taking: Reported on 12/10/2024), Disp: 90 tablet, Rfl: 4     Semaglutide, 2 MG/DOSE, (OZEMPIC) 8 MG/3ML pen, Inject 2 mg subcutaneously every 7 days., Disp: 9 mL, Rfl: 3     Allergies   Allergen Reactions     Atorvastatin Other (See Comments)     Leg Cramps          Lab Results    Chemistry CBC Cardiac Enzymes/BNP/TSH/INR   Recent Labs   Lab Test 10/31/24  1328      POTASSIUM 4.5   CHLORIDE 102   CO2 24   GLC 99   BUN 29.4*   CR 1.65*   GFRESTIMATED 46*   TATIANNA 10.1     Recent Labs   Lab Test 10/31/24  1328 24  1121 24  0753   CR 1.65* 1.42* 1.66*          Recent Labs   Lab Test 10/31/24  1328   WBC 5.6   HGB 14.0   HCT 43.7   MCV 94        Recent Labs   Lab Test 10/31/24  1328 24  0753 23  0759  "  HGB 14.0 13.3 13.9    No results for input(s): \"TROPONINI\" in the last 57994 hours.  No results for input(s): \"BNP\", \"NTBNPI\", \"NTBNP\" in the last 24567 hours.  Recent Labs   Lab Test 11/03/23  1008   TSH 3.02     Recent Labs   Lab Test 11/26/19  0738   INR 0.99         Data Review    ECGs dated tracing dated 10/30/2024  (tracings independently reviewed)      Zio patch monitoring dated 11/19/2024   (independently reviewed)  Zio monitoring from 11/9/2024 to 11/12/2024 (duration 3d 1h).  Predominant underlying rhythm was sinus rhythm, 55 to 100 bpm, average 72 bpm.  No sustained tachyarrhythmias.  No atrial fibrillation.  There were no pauses of greater than 3 seconds.  Rare supraventricular ectopic beats (<1%).  1 run of Supraventricular Tachycardia occurred lasting 7 beats with a max rate of 136 bpm (avg 130 bpm).  Frequent monomorphic premature ventricular contractions (19.3%).  Symptom triggers correlated with sinus rhythm with PVC in bigeminy pattern.    Echocardiogram (TTE) dated 11/13/2024  1. Technically difficult study.  2. Normal left ventricular size and systolic performance with a visually  estimated ejection fraction of 50-55%.  3. No significant valvular heart disease is identified on this study.  4. Normal right ventricular size with low normal right ventricular systolic  performance.    Nuclear stress imaging study 12/17/2024     1.The nuclear stress test is abnormal.     2.Negative pharmacological regadenoson ECG for ischemia.     3.There is a small area of mild ischemia in the distal anterior and apical segment(s) of the left ventricle.  No scar seen.     4.The left ventricular ejection fraction at stress is 68%.     5.The patient is at a low risk of future cardiac ischemic events.     A prior study was conducted on 6/17/2019.  The area of distal anterior and apical ischemia is new.       Cc:         Thank you for allowing me to participate in the care of your patient.      Sincerely,     Milton " MD Toni     [facility]  cc:   [facility]  HEART & VASCULAR [address]  [address] [place] [address]

## 2025-01-30 ENCOUNTER — OFFICE VISIT (OUTPATIENT)
Dept: INTERNAL MEDICINE | Facility: CLINIC | Age: 65
End: 2025-01-30
Payer: MEDICARE

## 2025-01-30 VITALS
OXYGEN SATURATION: 96 % | RESPIRATION RATE: 17 BRPM | BODY MASS INDEX: 41.52 KG/M2 | HEART RATE: 77 BPM | TEMPERATURE: 97.4 F | SYSTOLIC BLOOD PRESSURE: 126 MMHG | DIASTOLIC BLOOD PRESSURE: 74 MMHG | HEIGHT: 68 IN | WEIGHT: 274 LBS

## 2025-01-30 DIAGNOSIS — E11.29 TYPE 2 DIABETES MELLITUS WITH MICROALBUMINURIA, WITHOUT LONG-TERM CURRENT USE OF INSULIN (H): Primary | ICD-10-CM

## 2025-01-30 DIAGNOSIS — I10 ESSENTIAL HYPERTENSION: ICD-10-CM

## 2025-01-30 DIAGNOSIS — N18.4 CKD (CHRONIC KIDNEY DISEASE) STAGE 4, GFR 15-29 ML/MIN (H): ICD-10-CM

## 2025-01-30 DIAGNOSIS — C61 PROSTATE CANCER METASTATIC TO INTRAPELVIC LYMPH NODE (H): ICD-10-CM

## 2025-01-30 DIAGNOSIS — C77.5 PROSTATE CANCER METASTATIC TO INTRAPELVIC LYMPH NODE (H): ICD-10-CM

## 2025-01-30 DIAGNOSIS — R80.9 TYPE 2 DIABETES MELLITUS WITH MICROALBUMINURIA, WITHOUT LONG-TERM CURRENT USE OF INSULIN (H): Primary | ICD-10-CM

## 2025-01-30 DIAGNOSIS — I25.10 CORONARY ARTERY DISEASE INVOLVING NATIVE CORONARY ARTERY OF NATIVE HEART WITHOUT ANGINA PECTORIS: ICD-10-CM

## 2025-01-30 DIAGNOSIS — E78.2 MIXED HYPERLIPIDEMIA: ICD-10-CM

## 2025-01-30 DIAGNOSIS — E66.01 SEVERE OBESITY (H): ICD-10-CM

## 2025-01-30 LAB
ANION GAP SERPL CALCULATED.3IONS-SCNC: 10 MMOL/L (ref 7–15)
BUN SERPL-MCNC: 32.2 MG/DL (ref 8–23)
CALCIUM SERPL-MCNC: 10.6 MG/DL (ref 8.8–10.4)
CHLORIDE SERPL-SCNC: 103 MMOL/L (ref 98–107)
CREAT SERPL-MCNC: 1.33 MG/DL (ref 0.67–1.17)
EGFRCR SERPLBLD CKD-EPI 2021: 59 ML/MIN/1.73M2
ERYTHROCYTE [DISTWIDTH] IN BLOOD BY AUTOMATED COUNT: 13.6 % (ref 10–15)
EST. AVERAGE GLUCOSE BLD GHB EST-MCNC: 114 MG/DL
GLUCOSE SERPL-MCNC: 95 MG/DL (ref 70–99)
HBA1C MFR BLD: 5.6 % (ref 0–5.6)
HCO3 SERPL-SCNC: 27 MMOL/L (ref 22–29)
HCT VFR BLD AUTO: 39.5 % (ref 40–53)
HGB BLD-MCNC: 13.4 G/DL (ref 13.3–17.7)
MCH RBC QN AUTO: 31.4 PG (ref 26.5–33)
MCHC RBC AUTO-ENTMCNC: 33.9 G/DL (ref 31.5–36.5)
MCV RBC AUTO: 93 FL (ref 78–100)
PLATELET # BLD AUTO: 364 10E3/UL (ref 150–450)
POTASSIUM SERPL-SCNC: 4.7 MMOL/L (ref 3.4–5.3)
RBC # BLD AUTO: 4.27 10E6/UL (ref 4.4–5.9)
SODIUM SERPL-SCNC: 140 MMOL/L (ref 135–145)
WBC # BLD AUTO: 6.7 10E3/UL (ref 4–11)

## 2025-01-30 RX ORDER — ROSUVASTATIN CALCIUM 5 MG/1
5 TABLET, COATED ORAL DAILY
COMMUNITY
Start: 2025-01-30

## 2025-01-30 ASSESSMENT — PAIN SCALES - GENERAL: PAINLEVEL_OUTOF10: NO PAIN (0)

## 2025-01-30 NOTE — PROGRESS NOTES
"  Office Visit - Follow Up   Manas Martinez   65 year old male    Date of Visit: 1/30/2025    Chief Complaint   Patient presents with    Follow Up     3 month follow up         Assessment and Plan   1. Type 2 diabetes mellitus with microalbuminuria, without long-term current use of insulin (H) (Primary)  This has been well-controlled check labs  - HEMOGLOBIN A1C; Future  - HEMOGLOBIN A1C    2. CKD (chronic kidney disease) stage 4, GFR 15-29 ml/min (H)  Stable, continue current medications check labs  - BASIC METABOLIC PANEL; Future  - CBC with platelets; Future  - BASIC METABOLIC PANEL  - CBC with platelets    3. CAD, MI ~'98, PALLAVI RCA  Reviewed recent cardiovascular evaluation and cardiology recommendations.  Strongly encouraged him to take the rosuvastatin and he is now in agreement    4. Mixed hyperlipidemia  - rosuvastatin (CRESTOR) 5 MG tablet; Take 1 tablet (5 mg) by mouth daily.    5. Essential hypertension  Blood pressure okay continue same    6. Prostate cancer metastatic to intrapelvic lymph node (H)  On Lupron, continue current management per hematology/oncology    7. Severe obesity (H)  The following high BMI interventions were performed this visit: encouragement to exercise and lifestyle education regarding diet    Return in about 3 months (around 4/30/2025) for Follow up.     History of Present Illness   This 65 year old man comes in for follow-up.  Overall feeling well       Physical Exam   General Appearance:   No acute distress    /74 (BP Location: Left arm, Patient Position: Sitting, Cuff Size: Adult Regular)   Pulse 77   Temp 97.4  F (36.3  C) (Temporal)   Resp 17   Ht 1.727 m (5' 8\")   Wt 124.3 kg (274 lb)   SpO2 96%   BMI 41.66 kg/m           Additional Information   Current Outpatient Medications   Medication Sig Dispense Refill    allopurinol (ZYLOPRIM) 100 MG tablet Take 1 tablet (100 mg) by mouth daily. 90 tablet 4    aspirin (ASA) 81 MG EC tablet Take 1 tablet (81 mg) by mouth " daily      carvedilol (COREG) 25 MG tablet Take 1 tablet (25 mg) by mouth 2 times daily (with meals) 180 tablet 2    empagliflozin (JARDIANCE) 10 MG TABS tablet Take 1 tablet (10 mg) by mouth daily 90 tablet 1    leuprolide (LUPRON DEPOT) 22.5 MG kit Inject 22.5 mg into the muscle every 3 months      losartan (COZAAR) 100 MG tablet Take 1 tablet (100 mg) by mouth daily 90 tablet 4    Multiple Vitamins-Minerals (MULTIVITAMIN ADULT, MINERALS, PO) Take 1 tablet by mouth daily      rosuvastatin (CRESTOR) 5 MG tablet Take 1 tablet (5 mg) by mouth daily.      Semaglutide, 2 MG/DOSE, (OZEMPIC) 8 MG/3ML pen Inject 2 mg subcutaneously every 7 days. 9 mL 3       Time:     The longitudinal plan of care for the diagnosis(es)/condition(s) as documented were addressed during this visit. Due to the added complexity in care, I will continue to support Manas in the subsequent management and with ongoing continuity of care.     Waldo Kerr MD  Answers submitted by the patient for this visit:  CKD Visit (Submitted on 1/25/2025)  Chief Complaint: Chronic problems general questions HPI Form  Do you take any over the counter pain medicine?  : No  General Questionnaire (Submitted on 1/25/2025)  Chief Complaint: Chronic problems general questions HPI Form  How many servings of fruits and vegetables do you eat daily?: 2-3  On average, how many sweetened beverages do you drink each day (Examples: soda, juice, sweet tea, etc.  Do NOT count diet or artificially sweetened beverages)?: 0  How many minutes a day do you exercise enough to make your heart beat faster?: 9 or less  How many days a week do you exercise enough to make your heart beat faster?: 3 or less  Questionnaire about: Chronic problems general questions HPI Form (Submitted on 1/25/2025)  Chief Complaint: Chronic problems general questions HPI Form

## 2025-02-17 ENCOUNTER — PATIENT OUTREACH (OUTPATIENT)
Dept: ONCOLOGY | Facility: HOSPITAL | Age: 65
End: 2025-02-17
Payer: MEDICARE

## 2025-02-25 ENCOUNTER — NURSE TRIAGE (OUTPATIENT)
Dept: INTERNAL MEDICINE | Facility: CLINIC | Age: 65
End: 2025-02-25
Payer: MEDICARE

## 2025-02-25 NOTE — TELEPHONE ENCOUNTER
S-(situation): Patient fell and is unable to get up from floor    B-(background): Patient fell while in living room 15 min before call. Reported that lost balance and tripped. He fell backwards. He did endorse slight head strike but denied loss of consciousness. Per chart, not on anticoagulation therapy. He is unable to get himself off the floor. Spouse is present but she is unable to help him. He did note that he woke with severe ankle and shoulder pain 8/10. This was not worsened with the fall. He also said he has been urinating blood since yesterday.     A-(assessment): Denied bleeding, injury, confusion, headache, vision changes, dizziness, lightheadedness, swelling. He is speaking in clear sentences and answering questions appropriately. He states he is too weak to get himself off the floor.     R-(recommendations): Discussed calling EMS for transfer assist from floor. We also discussed evaluation today for severe ankle and shoulder pain and urinating blood. Wife does not think that she could transport him via car so they will plan to go to ED with ambulance for evaluation. They will call 911 and did not need assistance with this. They had no further questions.       Reason for Disposition   SEVERE weakness (e.g., unable to walk or barely able to walk, requires support) and new-onset or getting worse   Can't stand (bear weight) or walk and new-onset after fall    Additional Information   Negative: Major injury from dangerous force (e.g., fall > 10 feet or 3 meters)   Negative: Major bleeding (e.g., actively dripping or spurting) and can't be stopped   Negative: Shock suspected (e.g., cold/pale/clammy skin, too weak to stand)   Negative: Difficult to awaken or acting confused (e.g., disoriented, slurred speech)    Protocols used: Falls and [id number]

## 2025-03-19 ENCOUNTER — ENROLLMENT (OUTPATIENT)
Dept: HOME HEALTH SERVICES | Facility: HOME HEALTH | Age: 65
End: 2025-03-19
Payer: MEDICARE

## 2025-03-21 ENCOUNTER — ORDERS ONLY (AUTO-RELEASED) (OUTPATIENT)
Dept: CARDIOLOGY | Facility: CLINIC | Age: 65
End: 2025-03-21
Payer: MEDICARE

## 2025-03-21 DIAGNOSIS — I49.3 FREQUENT PVCS: ICD-10-CM

## 2025-03-24 ENCOUNTER — TELEPHONE (OUTPATIENT)
Dept: INTERNAL MEDICINE | Facility: CLINIC | Age: 65
End: 2025-03-24
Payer: MEDICARE

## 2025-03-24 NOTE — TELEPHONE ENCOUNTER
March 24, 2025    Home health orders was received via fax for Dr. Kerr.  Patient label was attached to paperwork and placed in the inbox for  Dr. Joseph (covering provider) to review and sign.    Catherine Herman

## 2025-03-25 ENCOUNTER — TELEPHONE (OUTPATIENT)
Dept: INTERNAL MEDICINE | Facility: CLINIC | Age: 65
End: 2025-03-25
Payer: MEDICARE

## 2025-03-25 NOTE — TELEPHONE ENCOUNTER
March 25, 2025    Home health orders was received via fax for Dr. Kerr.  Patient label was attached to paperwork and placed in the inbox for  Dr. Norris (covering provider) to review and sign.    Catherine Herman

## 2025-03-26 NOTE — TELEPHONE ENCOUNTER
March 26, 2025    Home health orders was picked up from outbox of Dr. Norris and sent via fax to [phone].    Catherine Herman

## 2025-03-27 NOTE — TELEPHONE ENCOUNTER
March 27, 2025    Home health orders was picked up from outbox of Dr. Joseph and sent via fax to [phone].    Catherine Herman

## 2025-03-28 RX ORDER — CEFTRIAXONE SODIUM 10 G/100ML
INJECTION, POWDER, FOR SOLUTION INTRAVENOUS
COMMUNITY
Start: 2025-03-26

## 2025-03-28 RX ORDER — OMEPRAZOLE 20 MG/1
20 CAPSULE, DELAYED RELEASE ORAL
COMMUNITY
Start: 2025-03-21 | End: 2026-03-21

## 2025-03-28 RX ORDER — OXYCODONE HYDROCHLORIDE 5 MG/1
5 TABLET ORAL
COMMUNITY
Start: 2025-03-21 | End: 2025-04-01

## 2025-03-28 RX ORDER — TETRACYCLINE HYDROCHLORIDE 500 MG/1
CAPSULE ORAL
COMMUNITY
Start: 2025-03-21

## 2025-03-28 RX ORDER — METRONIDAZOLE 500 MG/1
TABLET ORAL
COMMUNITY
Start: 2025-03-21

## 2025-03-28 RX ORDER — CEFTRIAXONE 2 G/1
2 INJECTION, POWDER, FOR SOLUTION INTRAMUSCULAR; INTRAVENOUS EVERY 12 HOURS
COMMUNITY
Start: 2025-03-10 | End: 2025-04-09

## 2025-03-31 ENCOUNTER — MYC MEDICAL ADVICE (OUTPATIENT)
Dept: INTERNAL MEDICINE | Facility: CLINIC | Age: 65
End: 2025-03-31
Payer: MEDICARE

## 2025-03-31 DIAGNOSIS — M00.219: Primary | ICD-10-CM

## 2025-03-31 NOTE — TELEPHONE ENCOUNTER
Chart review indicates on 3/27 patient already has reached out regarding this issue.   It was sent to covering provider for Dr. Kerr (Dr. Norris) and he messaged the patient asking for clarification, to which the patient did not reply.      Patient stated that TCU/rehab to contact PCP for a new prescription for oxycodone.

## 2025-04-01 ENCOUNTER — TELEPHONE (OUTPATIENT)
Dept: INTERNAL MEDICINE | Facility: CLINIC | Age: 65
End: 2025-04-01
Payer: MEDICARE

## 2025-04-01 RX ORDER — OXYCODONE HYDROCHLORIDE 5 MG/1
5 TABLET ORAL EVERY 6 HOURS PRN
Qty: 20 TABLET | Refills: 0 | Status: SHIPPED | OUTPATIENT
Start: 2025-04-01

## 2025-04-01 NOTE — TELEPHONE ENCOUNTER
April 1, 2025    Home health orders was received via fax for Dr. Kerr.  Patient label was attached to paperwork and placed in provider's inbox to be signed.    Catherine Herman

## 2025-04-01 NOTE — TELEPHONE ENCOUNTER
Patient contacted via phone and notified of prescription sent by Dr. Kerr. Post hospital follow up scheduled on Friday, 4/4/25.

## 2025-04-02 ENCOUNTER — TELEPHONE (OUTPATIENT)
Dept: INTERNAL MEDICINE | Facility: CLINIC | Age: 65
End: 2025-04-02
Payer: MEDICARE

## 2025-04-02 NOTE — TELEPHONE ENCOUNTER
April 2, 2025    Home health orders was received via fax for Dr. Kerr.  Patient label was attached to paperwork and placed in provider's inbox to be signed.    Catherine Herman

## 2025-04-03 NOTE — TELEPHONE ENCOUNTER
April 3, 2025    Home health orders was picked up from outbox of Dr. Kerr and sent via fax to [phone].    Catherine Herman

## 2025-04-04 PROBLEM — K26.9 DUODENAL ULCER DUE TO HELICOBACTER PYLORI: Status: ACTIVE | Noted: 2025-04-04

## 2025-04-04 PROBLEM — B96.81 DUODENAL ULCER DUE TO HELICOBACTER PYLORI: Status: ACTIVE | Noted: 2025-04-04

## 2025-04-07 ENCOUNTER — TELEPHONE (OUTPATIENT)
Dept: INTERNAL MEDICINE | Facility: CLINIC | Age: 65
End: 2025-04-07
Payer: MEDICARE

## 2025-04-07 NOTE — TELEPHONE ENCOUNTER
oxyCODONE (ROXICODONE) 5 MG tablet 30 tablet 0 4/4/2025 -- No   Sig - Route: Take 1 tablet (5 mg) by mouth 3 times daily. - Oral   Sent to pharmacy as: oxyCODONE HCl 5 MG Oral Tablet (ROXICODONE)     Pharmacy states they were only able to give 7 days of this since it is not a chronic pain - in order to fill full 30 days diagnosis code will need to be a chronic pain diagnosis please advise on sending new script or next steps

## 2025-04-17 ENCOUNTER — PATIENT OUTREACH (OUTPATIENT)
Dept: CARE COORDINATION | Facility: CLINIC | Age: 65
End: 2025-04-17
Payer: MEDICARE

## 2025-04-17 ENCOUNTER — TELEPHONE (OUTPATIENT)
Dept: INTERNAL MEDICINE | Facility: CLINIC | Age: 65
End: 2025-04-17
Payer: MEDICARE

## 2025-04-17 NOTE — TELEPHONE ENCOUNTER
April 17, 2025    Home health orders was received via fax for Dr. Kerr.  Patient label was attached to paperwork and placed in provider's inbox to be signed.    Catherine Herman

## 2025-04-29 ENCOUNTER — TELEPHONE (OUTPATIENT)
Dept: INTERNAL MEDICINE | Facility: CLINIC | Age: 65
End: 2025-04-29
Payer: MEDICARE

## 2025-04-29 NOTE — TELEPHONE ENCOUNTER
April 29, 2025    Home health orders was received via fax for Dr. Kerr.  Patient label was attached to paperwork and placed in provider's inbox to be signed.    Catherine Herman

## 2025-04-30 NOTE — TELEPHONE ENCOUNTER
April 30, 2025    Home health orders was picked up from outbox of Dr. Kerr and sent via fax to [phone].    Catherine Herman

## 2025-05-01 ENCOUNTER — PATIENT OUTREACH (OUTPATIENT)
Dept: CARE COORDINATION | Facility: CLINIC | Age: 65
End: 2025-05-01
Payer: MEDICARE

## 2025-05-01 ENCOUNTER — TELEPHONE (OUTPATIENT)
Dept: INTERNAL MEDICINE | Facility: CLINIC | Age: 65
End: 2025-05-01
Payer: MEDICARE

## 2025-05-01 NOTE — TELEPHONE ENCOUNTER
May 1, 2025    Home health orders was received via fax for Dr. Kerr.  Patient label was attached to paperwork and placed in provider's inbox to be signed.    Catherine Herman

## 2025-05-06 ENCOUNTER — OFFICE VISIT (OUTPATIENT)
Dept: INTERNAL MEDICINE | Facility: CLINIC | Age: 65
End: 2025-05-06
Payer: MEDICARE

## 2025-05-06 VITALS
DIASTOLIC BLOOD PRESSURE: 76 MMHG | RESPIRATION RATE: 13 BRPM | SYSTOLIC BLOOD PRESSURE: 138 MMHG | BODY MASS INDEX: 38.65 KG/M2 | HEART RATE: 88 BPM | WEIGHT: 255 LBS | HEIGHT: 68 IN | TEMPERATURE: 97.8 F | OXYGEN SATURATION: 97 %

## 2025-05-06 DIAGNOSIS — K68.12: ICD-10-CM

## 2025-05-06 DIAGNOSIS — E11.29 TYPE 2 DIABETES MELLITUS WITH MICROALBUMINURIA, WITHOUT LONG-TERM CURRENT USE OF INSULIN (H): ICD-10-CM

## 2025-05-06 DIAGNOSIS — R80.9 TYPE 2 DIABETES MELLITUS WITH MICROALBUMINURIA, WITHOUT LONG-TERM CURRENT USE OF INSULIN (H): ICD-10-CM

## 2025-05-06 DIAGNOSIS — R78.81 BACTEREMIA DUE TO STREPTOCOCCUS PNEUMONIAE: ICD-10-CM

## 2025-05-06 DIAGNOSIS — N18.4 CKD (CHRONIC KIDNEY DISEASE) STAGE 4, GFR 15-29 ML/MIN (H): ICD-10-CM

## 2025-05-06 DIAGNOSIS — B95.3 BACTEREMIA DUE TO STREPTOCOCCUS PNEUMONIAE: ICD-10-CM

## 2025-05-06 DIAGNOSIS — M00.219: ICD-10-CM

## 2025-05-06 DIAGNOSIS — G06.1 ABSCESS IN EPIDURAL SPACE OF LUMBAR SPINE: Primary | ICD-10-CM

## 2025-05-06 DIAGNOSIS — I25.10 CORONARY ARTERY DISEASE INVOLVING NATIVE CORONARY ARTERY OF NATIVE HEART WITHOUT ANGINA PECTORIS: ICD-10-CM

## 2025-05-06 PROCEDURE — 1126F AMNT PAIN NOTED NONE PRSNT: CPT | Performed by: INTERNAL MEDICINE

## 2025-05-06 PROCEDURE — G2211 COMPLEX E/M VISIT ADD ON: HCPCS | Performed by: INTERNAL MEDICINE

## 2025-05-06 PROCEDURE — 3078F DIAST BP <80 MM HG: CPT | Performed by: INTERNAL MEDICINE

## 2025-05-06 PROCEDURE — 3075F SYST BP GE 130 - 139MM HG: CPT | Performed by: INTERNAL MEDICINE

## 2025-05-06 PROCEDURE — 99214 OFFICE O/P EST MOD 30 MIN: CPT | Performed by: INTERNAL MEDICINE

## 2025-05-06 ASSESSMENT — PAIN SCALES - GENERAL: PAINLEVEL_OUTOF10: NO PAIN (0)

## 2025-05-06 NOTE — PROGRESS NOTES
"  Office Visit - Follow Up   Manas Martinez   65 year old male    Date of Visit: 5/6/2025    Chief Complaint   Patient presents with    Follow Up     3 month follow up. Post operative update, 4/15/25 back surgery, feeling ok since then.   PICC line being taken out tomorrow.         Assessment and Plan   1. Abscess in epidural space of lumbar spine (Primary)  2. Bacteremia due to Streptococcus pneumoniae  3. Streptococcal arthritis of shoulder, unspecified laterality (H)  4. Abscess, psoas (H)  He is finishing ceftriaxone and will have his PICC line removed.  He has a couple more doses left of oral metronidazole.  His cultures from surgery were normal and negative.  At this time it appears that no additional antibiotics are being prescribed.  He will follow-up closely with infectious diseases and have his labs monitored.  His CRP is down to 1.5.    5. Type 2 diabetes mellitus with microalbuminuria, without long-term current use of insulin (H)  Has been well-controlled now back on Ozempic    6. CKD (chronic kidney disease) stage 4, GFR 15-29 ml/min (H)  Stable continue same    7. Coronary artery disease involving native coronary artery of native heart without angina pectoris  Continue preventative measures, at some point should resume aspirin    Return in about 4 weeks (around 6/3/2025) for Routine preventive.     History of Present Illness   This 65 year old man comes in for follow-up.  He is doing much better.  No longer requiring opioid pain medications.  Reviewed hospitalization and surgical records as well as ID notes.       Physical Exam   General Appearance:   No acute distress    /76 (BP Location: Right arm, Patient Position: Sitting, Cuff Size: Adult Regular)   Pulse 88   Temp 97.8  F (36.6  C) (Temporal)   Resp 13   Ht 1.727 m (5' 8\")   Wt 115.7 kg (255 lb)   SpO2 97%   BMI 38.77 kg/m           Additional Information   Current Outpatient Medications   Medication Sig Dispense Refill    " acetaminophen (TYLENOL) 500 MG tablet Take 500-1,000 mg by mouth every 6 hours as needed for mild pain.      allopurinol (ZYLOPRIM) 100 MG tablet Take 1 tablet (100 mg) by mouth daily. 90 tablet 4    alteplase (CATHFLO ACTIVASE) injection Inject 2 mg into the vein.      carvedilol (COREG) 25 MG tablet Take 1 tablet (25 mg) by mouth 2 times daily (with meals) 180 tablet 2    cefTRIAXone (ROCEPHIN) 2 GM vial Inject 2 g into the vein.      gabapentin (NEURONTIN) 100 MG capsule Take 100 mg by mouth.      Lactobacillus Acid-Pectin (LACTOBACILLUS ACIDOPHILUS) TABS Take 1 tablet by mouth 3 times daily (before meals).      leuprolide (LUPRON DEPOT) 22.5 MG kit Inject 22.5 mg into the muscle every 3 months      losartan (COZAAR) 100 MG tablet Take 1 tablet (100 mg) by mouth daily 90 tablet 4    metroNIDAZOLE (FLAGYL) 500 MG tablet Take 500 mg by mouth.      Multiple Vitamins-Minerals (MULTIVITAMIN ADULT, MINERALS, PO) Take 1 tablet by mouth daily      omeprazole (PRILOSEC) 20 MG DR capsule Take 20 mg by mouth.      oxyCODONE (ROXICODONE) 5 MG tablet Take 1 tablet (5 mg) by mouth 3 times daily. 30 tablet 0    rosuvastatin (CRESTOR) 5 MG tablet Take 5 mg by mouth daily.      Semaglutide, 2 MG/DOSE, (OZEMPIC) 8 MG/3ML pen Inject 2 mg subcutaneously every 7 days. 9 mL 3    aspirin (ASA) 81 MG EC tablet Take 1 tablet (81 mg) by mouth daily (Patient not taking: Reported on 5/6/2025)         Time:     The longitudinal plan of care for the diagnosis(es)/condition(s) as documented were addressed during this visit. Due to the added complexity in care, I will continue to support Manas in the subsequent management and with ongoing continuity of care.     Waldo Kerr MD  Answers submitted by the patient for this visit:  General Questionnaire (Submitted on 5/1/2025)  Chief Complaint: Chronic problems general questions HPI Form  What is the reason for your visit today? : Post opp  How many days per week do you miss taking your  medication?: 0  Questionnaire about: Chronic problems general questions HPI Form (Submitted on 5/1/2025)  Chief Complaint: Chronic problems general questions HPI Form

## 2025-05-08 ENCOUNTER — TELEPHONE (OUTPATIENT)
Dept: INTERNAL MEDICINE | Facility: CLINIC | Age: 65
End: 2025-05-08
Payer: MEDICARE

## 2025-05-08 NOTE — TELEPHONE ENCOUNTER
May 8, 2025    Home health orders was received via fax for Dr. Kerr.  Patient label was attached to paperwork and placed in provider's inbox to be signed.    Catherine Herman

## 2025-05-18 ENCOUNTER — HEALTH MAINTENANCE LETTER (OUTPATIENT)
Age: 65
End: 2025-05-18

## 2025-06-05 SDOH — HEALTH STABILITY: PHYSICAL HEALTH: ON AVERAGE, HOW MANY DAYS PER WEEK DO YOU ENGAGE IN MODERATE TO STRENUOUS EXERCISE (LIKE A BRISK WALK)?: 0 DAYS

## 2025-06-05 SDOH — HEALTH STABILITY: PHYSICAL HEALTH: ON AVERAGE, HOW MANY MINUTES DO YOU ENGAGE IN EXERCISE AT THIS LEVEL?: 0 MIN

## 2025-06-05 ASSESSMENT — SOCIAL DETERMINANTS OF HEALTH (SDOH): HOW OFTEN DO YOU GET TOGETHER WITH FRIENDS OR RELATIVES?: ONCE A WEEK

## 2025-06-10 ENCOUNTER — OFFICE VISIT (OUTPATIENT)
Dept: INTERNAL MEDICINE | Facility: CLINIC | Age: 65
End: 2025-06-10
Payer: MEDICARE

## 2025-06-10 VITALS
BODY MASS INDEX: 38.97 KG/M2 | TEMPERATURE: 96.9 F | HEIGHT: 68 IN | HEART RATE: 70 BPM | RESPIRATION RATE: 15 BRPM | SYSTOLIC BLOOD PRESSURE: 132 MMHG | DIASTOLIC BLOOD PRESSURE: 70 MMHG | OXYGEN SATURATION: 99 % | WEIGHT: 257.1 LBS

## 2025-06-10 DIAGNOSIS — M10.072 ACUTE IDIOPATHIC GOUT INVOLVING TOE OF LEFT FOOT: ICD-10-CM

## 2025-06-10 DIAGNOSIS — I10 ESSENTIAL HYPERTENSION: ICD-10-CM

## 2025-06-10 DIAGNOSIS — D35.01 ADRENAL ADENOMA, RIGHT: ICD-10-CM

## 2025-06-10 DIAGNOSIS — R35.0 BENIGN PROSTATIC HYPERPLASIA WITH URINARY FREQUENCY: ICD-10-CM

## 2025-06-10 DIAGNOSIS — E78.2 MIXED HYPERLIPIDEMIA: ICD-10-CM

## 2025-06-10 DIAGNOSIS — B95.3 BACTEREMIA DUE TO STREPTOCOCCUS PNEUMONIAE: ICD-10-CM

## 2025-06-10 DIAGNOSIS — C77.5 PROSTATE CANCER METASTATIC TO INTRAPELVIC LYMPH NODE (H): ICD-10-CM

## 2025-06-10 DIAGNOSIS — G47.33 OSA (OBSTRUCTIVE SLEEP APNEA): ICD-10-CM

## 2025-06-10 DIAGNOSIS — B96.81 DUODENAL ULCER DUE TO HELICOBACTER PYLORI: ICD-10-CM

## 2025-06-10 DIAGNOSIS — N18.4 CKD (CHRONIC KIDNEY DISEASE) STAGE 4, GFR 15-29 ML/MIN (H): ICD-10-CM

## 2025-06-10 DIAGNOSIS — Z90.81 ASPLENIA AFTER SURGICAL PROCEDURE: ICD-10-CM

## 2025-06-10 DIAGNOSIS — G06.1 ABSCESS IN EPIDURAL SPACE OF LUMBAR SPINE: ICD-10-CM

## 2025-06-10 DIAGNOSIS — N28.1 CYST OF LEFT KIDNEY: ICD-10-CM

## 2025-06-10 DIAGNOSIS — K26.9 DUODENAL ULCER DUE TO HELICOBACTER PYLORI: ICD-10-CM

## 2025-06-10 DIAGNOSIS — I25.10 CORONARY ARTERY DISEASE INVOLVING NATIVE CORONARY ARTERY OF NATIVE HEART WITHOUT ANGINA PECTORIS: ICD-10-CM

## 2025-06-10 DIAGNOSIS — Z00.00 ANNUAL PHYSICAL EXAM: Primary | ICD-10-CM

## 2025-06-10 DIAGNOSIS — R80.9 TYPE 2 DIABETES MELLITUS WITH MICROALBUMINURIA, WITHOUT LONG-TERM CURRENT USE OF INSULIN (H): ICD-10-CM

## 2025-06-10 DIAGNOSIS — C61 PROSTATE CANCER METASTATIC TO INTRAPELVIC LYMPH NODE (H): ICD-10-CM

## 2025-06-10 DIAGNOSIS — N40.1 BENIGN PROSTATIC HYPERPLASIA WITH URINARY FREQUENCY: ICD-10-CM

## 2025-06-10 DIAGNOSIS — E66.01 SEVERE OBESITY (H): ICD-10-CM

## 2025-06-10 DIAGNOSIS — M00.219: ICD-10-CM

## 2025-06-10 DIAGNOSIS — R78.81 BACTEREMIA DUE TO STREPTOCOCCUS PNEUMONIAE: ICD-10-CM

## 2025-06-10 DIAGNOSIS — E11.29 TYPE 2 DIABETES MELLITUS WITH MICROALBUMINURIA, WITHOUT LONG-TERM CURRENT USE OF INSULIN (H): ICD-10-CM

## 2025-06-10 LAB
CHOLEST SERPL-MCNC: 277 MG/DL
CREAT UR-MCNC: 102 MG/DL
EST. AVERAGE GLUCOSE BLD GHB EST-MCNC: 108 MG/DL
FASTING STATUS PATIENT QL REPORTED: YES
HBA1C MFR BLD: 5.4 % (ref 0–5.6)
HDLC SERPL-MCNC: 42 MG/DL
LDLC SERPL CALC-MCNC: 192 MG/DL
MICROALBUMIN UR-MCNC: 13.1 MG/L
MICROALBUMIN/CREAT UR: 12.84 MG/G CR (ref 0–17)
NONHDLC SERPL-MCNC: 235 MG/DL
TRIGL SERPL-MCNC: 217 MG/DL

## 2025-06-10 PROCEDURE — G2211 COMPLEX E/M VISIT ADD ON: HCPCS | Performed by: INTERNAL MEDICINE

## 2025-06-10 PROCEDURE — 3075F SYST BP GE 130 - 139MM HG: CPT | Performed by: INTERNAL MEDICINE

## 2025-06-10 PROCEDURE — 83036 HEMOGLOBIN GLYCOSYLATED A1C: CPT | Performed by: INTERNAL MEDICINE

## 2025-06-10 PROCEDURE — 82043 UR ALBUMIN QUANTITATIVE: CPT | Performed by: INTERNAL MEDICINE

## 2025-06-10 PROCEDURE — 3078F DIAST BP <80 MM HG: CPT | Performed by: INTERNAL MEDICINE

## 2025-06-10 PROCEDURE — 82570 ASSAY OF URINE CREATININE: CPT | Performed by: INTERNAL MEDICINE

## 2025-06-10 PROCEDURE — G0439 PPPS, SUBSEQ VISIT: HCPCS | Performed by: INTERNAL MEDICINE

## 2025-06-10 PROCEDURE — 36415 COLL VENOUS BLD VENIPUNCTURE: CPT | Performed by: INTERNAL MEDICINE

## 2025-06-10 PROCEDURE — 80061 LIPID PANEL: CPT | Performed by: INTERNAL MEDICINE

## 2025-06-10 PROCEDURE — 99214 OFFICE O/P EST MOD 30 MIN: CPT | Mod: 25 | Performed by: INTERNAL MEDICINE

## 2025-06-10 RX ORDER — ASPIRIN 81 MG/1
81 TABLET, COATED ORAL DAILY
COMMUNITY
Start: 2025-06-10

## 2025-06-10 RX ORDER — LOSARTAN POTASSIUM 100 MG/1
100 TABLET ORAL DAILY
Qty: 90 TABLET | Refills: 4 | Status: SHIPPED | OUTPATIENT
Start: 2025-06-10

## 2025-06-10 RX ORDER — ROSUVASTATIN CALCIUM 5 MG/1
5 TABLET, COATED ORAL DAILY
Qty: 90 TABLET | Refills: 4 | Status: SHIPPED | OUTPATIENT
Start: 2025-06-10

## 2025-06-10 RX ORDER — ALLOPURINOL 100 MG/1
100 TABLET ORAL DAILY
Qty: 90 TABLET | Refills: 4 | Status: SHIPPED | OUTPATIENT
Start: 2025-06-10

## 2025-06-10 RX ORDER — OMEPRAZOLE 20 MG/1
20 CAPSULE, DELAYED RELEASE ORAL DAILY
Qty: 90 CAPSULE | Refills: 1 | Status: SHIPPED | OUTPATIENT
Start: 2025-06-10

## 2025-06-10 NOTE — PROGRESS NOTES
Preventive Care Visit  [facility]  Waldo Kerr MD, Internal Medicine  Reji 10, 2025      Assessment & Plan     1. Annual physical exam (Primary)  This is a 65-year-old man with issues as discussed below.  Ongoing health lifestyle discussed recommended.  Vaccines discussed with patient    2. Bacteremia due to Streptococcus pneumoniae  3. Abscess in epidural space of lumbar spine  4. Streptococcal arthritis of shoulder, unspecified laterality (H)  Now off antibiotics seems to be doing well close follow-up with spine surgery recommended    5. Asplenia after surgical procedure  Vaccines discussed    6. Coronary artery disease involving native coronary artery of native heart without angina pectoris  I would like him to resume aspirin and atorvastatin, discussed.  Continue carvedilol  - Lipid panel reflex to direct LDL Fasting; Future  - Lipid panel reflex to direct LDL Fasting    7. Type 2 diabetes mellitus with microalbuminuria, without long-term current use of insulin (H)  Has been well-controlled continue Ozempic, discussed switching to Mounjaro but he would like to stick with Ozempic  - HEMOGLOBIN A1C; Future  - HEMOGLOBIN A1C    8. CKD (chronic kidney disease) stage 4, GFR 15-29 ml/min (H)  Stable, continue current plan excellent blood pressure control  - Albumin Random Urine Quantitative with Creat Ratio; Future  - losartan (COZAAR) 100 MG tablet; Take 1 tablet (100 mg) by mouth daily.  Dispense: 90 tablet; Refill: 4  - Albumin Random Urine Quantitative with Creat Ratio    9. Mixed hyperlipidemia  - rosuvastatin (CRESTOR) 5 MG tablet; Take 1 tablet (5 mg) by mouth daily.  Dispense: 90 tablet; Refill: 4    10. Essential hypertension  As above    11. Adrenal adenoma, right, s/p adrenalectomy    12. Cyst of left kidney - simple    13. Acute idiopathic gout involving toe of left foot  - allopurinol (ZYLOPRIM) 100 MG tablet; Take 1 tablet (100 mg) by mouth daily.  Dispense: 90 tablet; Refill:  "4    14. Benign prostatic hyperplasia with urinary frequency  Stable    15. Duodenal ulcer due to Helicobacter pylori  Needs follow-up upper endoscopy, I believe H. pylori testing on most recent biopsy was negative although duodenitis noted    16. KIERSTEN not on CPAP    17. Prostate cancer metastatic to intrapelvic lymph node (H)  Continue Lupron and follow-up with oncology    18. Severe obesity (H)  The longitudinal plan of care for the diagnosis(es)/condition(s) as documented were addressed during this visit. Due to the added complexity in care, I will continue to support Manas in the subsequent management and with ongoing continuity of care.  BMI  Estimated body mass index is 39.67 kg/m  as calculated from the following:    Height as of this encounter: 1.715 m (5' 7.5\").    Weight as of this encounter: 116.6 kg (257 lb 1.6 oz).   Weight management plan: Discussed healthy diet and exercise guidelines    Counseling  Appropriate preventive services were addressed with this patient via screening, questionnaire, or discussion as appropriate for fall prevention, nutrition, physical activity, Tobacco-use cessation, social engagement, weight loss and cognition.  Checklist reviewing preventive services available has been given to the patient.  Reviewed patient's diet, addressing concerns and/or questions.   Information on urinary incontinence and treatment options given to patient.   I have reviewed Opioid Use Disorder and Substance Use Disorder risk factors and made any needed referrals.     Vikki Malagon is a 65 year old, presenting for the following:  Annual Visit        6/10/2025    10:29 AM   Additional Questions   Roomed by Abner CARSON RN          HPI     Advance Care Planning    Discussed advance care planning with patient; informed AVS has link to [facility].        6/5/2025   General Health   How would you rate your overall physical health? (!) FAIR   Feel stress (tense, anxious, or unable to sleep) Not at " all         6/5/2025   Nutrition   Diet: Regular (no restrictions)         6/5/2025   Exercise   Days per week of moderate/strenous exercise 0 days   Average minutes spent exercising at this level 0 min   (!) EXERCISE CONCERN      6/5/2025   Social Factors   Frequency of gathering with friends or relatives Once a week   Worry food won't last until get money to buy more No   Food not last or not have enough money for food? No   Do you have housing? (Housing is defined as stable permanent housing and does not include staying outside in a car, in a tent, in an abandoned building, in an overnight shelter, or couch-surfing.) Yes   Are you worried about losing your housing? No   Lack of transportation? No   Unable to get utilities (heat,electricity)? No         6/5/2025   Fall Risk   Fallen 2 or more times in the past year? No   Trouble with walking or balance? No          6/5/2025   Activities of Daily Living- Home Safety   Needs help with the following daily activites None of the above   Safety concerns in the home None of the above         6/5/2025   Dental   Dentist two times every year? Yes         6/5/2025   Hearing Screening   Hearing concerns? None of the above         6/5/2025   Driving Risk Screening   Patient/family members have concerns about driving No         6/5/2025   General Alertness/Fatigue Screening   Have you been more tired than usual lately? No         6/5/2025   Urinary Incontinence Screening   Bothered by leaking urine in past 6 months Yes         Today's PHQ-2 Score:       6/10/2025    10:20 AM   PHQ-2 ( 1999 Pfizer)   Q1: Little interest or pleasure in doing things 0   Q2: Feeling down, depressed or hopeless 0   PHQ-2 Score 0    Q1: Little interest or pleasure in doing things Not at all   Q2: Feeling down, depressed or hopeless Not at all   PHQ-2 Score 0       Patient-reported           6/5/2025   Substance Use   Alcohol more than 3/day or more than 7/wk No   Do you have a current opioid  prescription? (!) YES   How severe/bad is pain from 1 to 10? 1/10   Do you use any other substances recreationally? No          No data to display              Low Risk (0-3)  Moderate Risk (4-7)  High Risk (>8)  Social History     Tobacco Use    Smoking status: Former     Current packs/day: 0.00     Average packs/day: 1 pack/day for 25.0 years (25.0 ttl pk-yrs)     Types: Cigarettes     Start date: 1974     Quit date: 1999     Years since quittin.4    Smokeless tobacco: Never   Vaping Use    Vaping status: Never Used   Substance Use Topics    Alcohol use: Yes     Alcohol/week: 10.0 standard drinks of alcohol    Drug use: No       Last PSA:   Prostate Specific Antigen Screen   Date Value Ref Range Status   2021 <0.1 0.0 - 4.5 ng/mL Final     PSA Tumor Marker   Date Value Ref Range Status   2025 0.24 0.00 - 4.50 ng/mL Final   2022 <0.10 0.00 - 4.50 ug/L Final     ASCVD Risk   The ASCVD Risk score (Rex BOOKER, et al., 2019) failed to calculate for the following reasons:    Risk score cannot be calculated because patient has a medical history suggesting prior/existing ASCVD        Reviewed and updated as needed this visit by Provider                  Current providers sharing in care for this patient include:  Patient Care Team:  Waldo Kerr MD as PCP - General (Internal Medicine)  Miguel Cordon MD as MD (Hematology & Oncology)  Daisha Keller MD as MD (Hematology & Oncology)  Waldo Kerr MD as Assigned PCP  Ginny Dos Santos MD as MD (Otolaryngology)  Benjamin Powell MD as MD (Otolaryngology)  Destinee Castaneda, RN as Specialty Care Coordinator (Hematology & Oncology)  Milton Muñoz MD as Assigned Heart and Vascular Provider  Isabel Lozaon PA-C as Assigned Cancer Care Provider    The following health maintenance items are reviewed in Epic and correct as of today:  Health Maintenance   Topic Date Due    ZOSTER VACCINE (1 of 2) Never done    RSV VACCINE (1 -  "Risk 60-74 years 1-dose series) Never done    COVID-19 VACCINE (3 - Pfizer risk series) 05/14/2021    EYE EXAM  11/01/2024    MICROALBUMIN  01/30/2025    A1C  04/30/2025    LIPID  05/17/2025    DIABETIC FOOT EXAM  05/17/2025    ANNUAL REVIEW OF HM ORDERS  05/17/2025    MEDICARE ANNUAL WELLNESS VISIT  05/17/2025    BMP  08/02/2025    INFLUENZA VACCINE (Season Ended) 09/01/2025    COLORECTAL CANCER SCREENING  09/08/2025    URIC ACID  10/31/2025    HEMOGLOBIN  11/02/2025    MENINGITIS VACCINE (3 - Risk 2-dose series) 11/05/2025    FALL RISK ASSESSMENT  06/10/2026    DTAP/TDAP/TD VACCINE (2 - Td or Tdap) 07/17/2027    ADVANCE CARE PLANNING  05/17/2029    PARATHYROID  Completed    PHOSPHORUS  Completed    HEPATITIS C SCREENING  Completed    PHQ-2 (once per calendar year)  Completed    PNEUMOCOCCAL VACCINE 50+ YEARS  Completed    URINALYSIS  Completed    ALK PHOS  Completed    AORTIC ANEURYSM SCREENING (SYSTEM ASSIGNED)  Completed    HPV VACCINE  Aged Out      Objective    Exam  /70 (BP Location: Left arm, Patient Position: Sitting, Cuff Size: Adult Regular)   Pulse 70   Temp 96.9  F (36.1  C) (Tympanic)   Resp 15   Ht 1.715 m (5' 7.5\")   Wt 116.6 kg (257 lb 1.6 oz)   SpO2 99%   BMI 39.67 kg/m     Estimated body mass index is 39.67 kg/m  as calculated from the following:    Height as of this encounter: 1.715 m (5' 7.5\").    Weight as of this encounter: 116.6 kg (257 lb 1.6 oz).    Physical Exam  EYES: Eyelids, conjunctiva, and sclera were normal.   HEAD, EARS, NOSE, MOUTH, AND THROAT: Head and face were normal. Hearing was normal to voice and the ears were normal to external exam. Nose appearance was normal and there was no discharge.  NECK: Neck appearance was normal.  RESPIRATORY: Breathing pattern was normal and the chest moved symmetrically.   Lung sounds were normal and there were no abnormal sounds.  CARDIOVASCULAR: Heart rate and rhythm were normal.  S1 and S2 were normal and there were no extra sounds " or murmurs.  There was no peripheral edema.  GASTROINTESTINAL: The abdomen was normal in contour.   NEUROLOGIC: The patient was alert and oriented to person, place, time, and circumstance. Speech was normal. Cranial nerves were normal. Motor strength was normal for age. The patient was normally coordinated.  PSYCHIATRIC:  Mood and affect were normal and the patient had normal recent and remote memory. The patient's judgment and insight were normal.      6/10/2025   Mini Cog   Clock Draw Score 2 Normal   3 Item Recall 3 objects recalled   Mini Cog Total Score 5              Signed Electronically by: Waldo Kerr MD

## 2025-06-10 NOTE — PATIENT INSTRUCTIONS
Patient Education   Preventive Care Advice   This is general advice given by our system to help you stay healthy. However, your care team may have specific advice just for you. Please talk to your care team about your preventive care needs.  Nutrition  Eat 5 or more servings of fruits and vegetables each day.  Try wheat bread, brown rice and whole grain pasta (instead of white bread, rice, and pasta).  Get enough calcium and vitamin D. Check the label on foods and aim for 100% of the RDA (recommended daily allowance).  Lifestyle  Exercise at least 150 minutes each week  (30 minutes a day, 5 days a week).  Do muscle strengthening activities 2 days a week. These help control your weight and prevent disease.  No smoking.  Wear sunscreen to prevent skin cancer.  Have a dental exam and cleaning every 6 months.  Yearly exams  See your health care team every year to talk about:  Any changes in your health.  Any medicines your care team has prescribed.  Preventive care, family planning, and ways to prevent chronic diseases.  Shots (vaccines)   HPV shots (up to age 26), if you've never had them before.  Hepatitis B shots (up to age 59), if you've never had them before.  COVID-19 shot: Get this shot when it's due.  Flu shot: Get a flu shot every year.  Tetanus shot: Get a tetanus shot every 10 years.  Pneumococcal, hepatitis A, and RSV shots: Ask your care team if you need these based on your risk.  Shingles shot (for age 50 and up)  General health tests  Diabetes screening:  Starting at age 35, Get screened for diabetes at least every 3 years.  If you are younger than age 35, ask your care team if you should be screened for diabetes.  Cholesterol test: At age 39, start having a cholesterol test every 5 years, or more often if advised.  Bone density scan (DEXA): At age 50, ask your care team if you should have this scan for osteoporosis (brittle bones).  Hepatitis C: Get tested at least once in your life.  STIs (sexually  transmitted infections)  Before age 24: Ask your care team if you should be screened for STIs.  After age 24: Get screened for STIs if you're at risk. You are at risk for STIs (including HIV) if:  You are sexually active with more than one person.  You don't use condoms every time.  You or a partner was diagnosed with a sexually transmitted infection.  If you are at risk for HIV, ask about PrEP medicine to prevent HIV.  Get tested for HIV at least once in your life, whether you are at risk for HIV or not.  Cancer screening tests  Cervical cancer screening: If you have a cervix, begin getting regular cervical cancer screening tests starting at age 21.  Breast cancer scan (mammogram): If you've ever had breasts, begin having regular mammograms starting at age 40. This is a scan to check for breast cancer.  Colon cancer screening: It is important to start screening for colon cancer at age 45.  Have a colonoscopy test every 10 years (or more often if you're at risk) Or, ask your provider about stool tests like a FIT test every year or Cologuard test every 3 years.  To learn more about your testing options, visit:   .  For help making a decision, visit:   https://bit.ly/hb36220.  Prostate cancer screening test: If you have a prostate, ask your care team if a prostate cancer screening test (PSA) at age 55 is right for you.  Lung cancer screening: If you are a current or former smoker ages 50 to 80, ask your care team if ongoing lung cancer screenings are right for you.  For informational purposes only. Not to replace the advice of your health care provider. Copyright   2023 [facility] [employer]. All rights reserved. Clinically reviewed by the [facility]. [employer] [id number] - REV 01/24.  Bladder Training: Care Instructions  Your Care Instructions     Bladder training is used to treat urge incontinence and stress incontinence. Urge incontinence means that the need to urinate comes on so fast  that you can't get to a toilet in time. Stress incontinence means that you leak urine because of pressure on your bladder. For example, it may happen when you laugh, cough, or lift something heavy.  Bladder training can increase how long you can wait before you have to urinate. It can also help your bladder hold more urine. And it can give you better control over the urge to urinate.  It is important to remember that bladder training takes a few weeks to a few months to make a difference. You may not see results right away, but don't give up.  Follow-up care is a key part of your treatment and safety. Be sure to make and go to all appointments, and call your doctor if you are having problems. It's also a good idea to know your test results and keep a list of the medicines you take.  How can you care for yourself at home?  Work with your doctor to come up with a bladder training program that is right for you. You may use one or more of the following methods.  Delayed urination  In the beginning, try to keep from urinating for 5 minutes after you first feel the need to go.  While you wait, take deep, slow breaths to relax. Kegel exercises can also help you delay the need to go to the bathroom.  After some practice, when you can easily wait 5 minutes to urinate, try to wait 10 minutes before you urinate.  Slowly increase the waiting period until you are able to control when you have to urinate.  Scheduled urination  Empty your bladder when you first wake up in the morning.  Schedule times throughout the day when you will urinate.  Start by going to the bathroom every hour, even if you don't need to go.  Slowly increase the time between trips to the bathroom.  When you have found a schedule that works well for you, keep doing it.  If you wake up during the night and have to urinate, do it. Apply your schedule to waking hours only.  Kegel exercises  These tighten and strengthen pelvic muscles, which can help you control  "the flow of urine. (If doing these exercises causes pain, stop doing them and talk with your doctor.) To do Kegel exercises:  Squeeze your muscles as if you were trying not to pass gas. Or squeeze your muscles as if you were stopping the flow of urine. Your belly, legs, and buttocks shouldn't move.  Hold the squeeze for 3 seconds, then relax for 5 to 10 seconds.  Start with 3 seconds, then add 1 second each week until you are able to squeeze for 10 seconds.  Repeat the exercise 10 times a session. Do 3 to 8 sessions a day.  When should you call for help?  Watch closely for changes in your health, and be sure to contact your doctor if:    Your incontinence is getting worse.     You do not get better as expected.   Where can you learn more?  Go to https://www.[employer].net/patiented  Enter V684 in the search box to learn more about \"Bladder Training: Care Instructions.\"  Current as of: April 30, 2024  Content Version: 14.5 2024-2025 [email or web address].   Care instructions adapted under license by your healthcare professional. If you have questions about a medical condition or this instruction, always ask your healthcare professional. [email or web address] disclaims any warranty or liability for your use of this information.    Chronic Pain: Care Instructions  Your Care Instructions     Chronic pain is pain that lasts a long time (months or even years) and may or may not have a clear cause. It is different from acute pain, which usually does have a clear cause--like an injury or illness--and gets better over time. Chronic pain:  Lasts over time but may vary from day to day.  Does not go away despite efforts to end it.  May disrupt your sleep and lead to fatigue.  May cause depression or anxiety.  May make your muscles tense, causing more pain.  Can disrupt your work, hobbies, home life, and relationships with friends and family.  Chronic pain is a very real condition. It is not just in your head. Treatment can " help and usually includes several methods used together, such as medicines, physical therapy, exercise, and other treatments. Learning how to relax and changing negative thought patterns can also help you cope.  Chronic pain is complex. Taking an active role in your treatment will help you better manage your pain. Tell your doctor if you have trouble dealing with your pain. You may have to try several things before you find what works best for you.  Follow-up care is a key part of your treatment and safety. Be sure to make and go to all appointments, and call your doctor if you are having problems. It's also a good idea to know your test results and keep a list of the medicines you take.  How can you care for yourself at home?  Pace yourself. Break up large jobs into smaller tasks. Save harder tasks for days when you have less pain, or go back and forth between hard tasks and easier ones. Take rest breaks.  Relax, and reduce stress. Relaxation techniques such as deep breathing or meditation can help.  Keep moving. Gentle, daily exercise can help reduce pain over the long run. Try low- or no-impact exercises such as walking, swimming, and stationary biking. Do stretches to stay flexible.  Try heat, cold packs, and massage.  Get enough sleep. Chronic pain can make you tired and drain your energy. Talk with your doctor if you have trouble sleeping because of pain.  Think positive. Your thoughts can affect your pain level. Do things that you enjoy to distract yourself when you have pain instead of focusing on the pain. See a movie, read a book, listen to music, or spend time with a friend.  If you think you are depressed, talk to your doctor about treatment.  Keep a daily pain diary. Record how your moods, thoughts, sleep patterns, activities, and medicine affect your pain. You may find that your pain is worse during or after certain activities or when you are feeling a certain emotion. Having a record of your pain can  "help you and your doctor find the best ways to treat your pain.  Take pain medicines exactly as directed.  If the doctor gave you a prescription medicine for pain, take it as prescribed.  If you are not taking a prescription pain medicine, ask your doctor if you can take an over-the-counter medicine.  Reducing constipation caused by pain medicine  Talk to your doctor about a laxative. If a laxative doesn't work, your doctor may suggest a prescription medicine.  Include fruits, vegetables, beans, and whole grains in your diet each day. These foods are high in fiber.  If your doctor recommends it, get more exercise. Walking is a good choice. Bit by bit, increase the amount you walk every day. Try for at least 30 minutes on most days of the week.  Schedule time each day for a bowel movement. A daily routine may help. Take your time and do not strain when having a bowel movement.  When should you call for help?   Call your doctor now or seek immediate medical care if:    Your pain gets worse or is out of control.     You feel down or blue, or you do not enjoy things like you once did. You may be depressed, which is common in people with chronic pain. Depression can be treated.     You have vomiting or cramps for more than 2 hours.   Watch closely for changes in your health, and be sure to contact your doctor if:    You cannot sleep because of pain.     You are very worried or anxious about your pain.     You have trouble taking your pain medicine.     You have any concerns about your pain medicine.     You have trouble with bowel movements, such as:  No bowel movement in 3 days.  Blood in the anal area, in your stool, or on the toilet paper.  Diarrhea for more than 24 hours.   Where can you learn more?  Go to https://www.[employer].net/patiented  Enter N004 in the search box to learn more about \"Chronic Pain: Care Instructions.\"  Current as of: July 31, 2024  Content Version: 14.5 2024-2025 [facility] [employer]. "   Care instructions adapted under license by your healthcare professional. If you have questions about a medical condition or this instruction, always ask your healthcare professional. [email or web address] disclaims any warranty or liability for your use of this information.

## 2025-06-11 ENCOUNTER — RESULTS FOLLOW-UP (OUTPATIENT)
Dept: INTERNAL MEDICINE | Facility: CLINIC | Age: 65
End: 2025-06-11

## 2025-06-23 DIAGNOSIS — I10 ESSENTIAL HYPERTENSION: ICD-10-CM

## 2025-06-23 RX ORDER — CARVEDILOL 25 MG/1
25 TABLET ORAL 2 TIMES DAILY WITH MEALS
Qty: 180 TABLET | Refills: 2 | Status: SHIPPED | OUTPATIENT
Start: 2025-06-23

## 2025-09-03 ENCOUNTER — PATIENT OUTREACH (OUTPATIENT)
Dept: CARE COORDINATION | Facility: CLINIC | Age: 65
End: 2025-09-03
Payer: MEDICARE

## 2025-09-04 DIAGNOSIS — Z12.11 COLON CANCER SCREENING: ICD-10-CM
